# Patient Record
Sex: MALE | Race: WHITE | NOT HISPANIC OR LATINO | Employment: FULL TIME | URBAN - METROPOLITAN AREA
[De-identification: names, ages, dates, MRNs, and addresses within clinical notes are randomized per-mention and may not be internally consistent; named-entity substitution may affect disease eponyms.]

---

## 2017-04-15 ENCOUNTER — HOSPITAL ENCOUNTER (EMERGENCY)
Facility: HOSPITAL | Age: 37
Discharge: HOME | End: 2017-04-15
Attending: EMERGENCY MEDICINE

## 2017-04-15 VITALS
TEMPERATURE: 99 F | SYSTOLIC BLOOD PRESSURE: 135 MMHG | HEIGHT: 74 IN | DIASTOLIC BLOOD PRESSURE: 73 MMHG | BODY MASS INDEX: 25.67 KG/M2 | WEIGHT: 200 LBS | RESPIRATION RATE: 18 BRPM | OXYGEN SATURATION: 96 % | HEART RATE: 68 BPM

## 2017-04-15 DIAGNOSIS — R56.9 SEIZURE: Primary | ICD-10-CM

## 2017-04-15 LAB
ALBUMIN SERPL BCP-MCNC: 4 G/DL
ALP SERPL-CCNC: 50 U/L
ALT SERPL W/O P-5'-P-CCNC: 19 U/L
AMPHET+METHAMPHET UR QL: NEGATIVE
ANION GAP SERPL CALC-SCNC: 12 MMOL/L
AST SERPL-CCNC: 16 U/L
BARBITURATES UR QL SCN>200 NG/ML: NEGATIVE
BASOPHILS # BLD AUTO: 0.04 K/UL
BASOPHILS NFR BLD: 0.2 %
BENZODIAZ UR QL SCN>200 NG/ML: NEGATIVE
BILIRUB SERPL-MCNC: 0.3 MG/DL
BUN SERPL-MCNC: 15 MG/DL
BZE UR QL SCN: NEGATIVE
CALCIUM SERPL-MCNC: 9.3 MG/DL
CANNABINOIDS UR QL SCN: NORMAL
CHLORIDE SERPL-SCNC: 113 MMOL/L
CO2 SERPL-SCNC: 20 MMOL/L
CREAT SERPL-MCNC: 0.9 MG/DL
CREAT UR-MCNC: 30.2 MG/DL
DIFFERENTIAL METHOD: ABNORMAL
EOSINOPHIL # BLD AUTO: 0.1 K/UL
EOSINOPHIL NFR BLD: 0.3 %
ERYTHROCYTE [DISTWIDTH] IN BLOOD BY AUTOMATED COUNT: 13 %
EST. GFR  (AFRICAN AMERICAN): >60 ML/MIN/1.73 M^2
EST. GFR  (NON AFRICAN AMERICAN): >60 ML/MIN/1.73 M^2
ETHANOL SERPL-MCNC: <10 MG/DL
GLUCOSE SERPL-MCNC: 79 MG/DL
HCT VFR BLD AUTO: 49.9 %
HGB BLD-MCNC: 16.8 G/DL
LYMPHOCYTES # BLD AUTO: 1.2 K/UL
LYMPHOCYTES NFR BLD: 6.2 %
MAGNESIUM SERPL-MCNC: 2.7 MG/DL
MCH RBC QN AUTO: 31.8 PG
MCHC RBC AUTO-ENTMCNC: 33.7 %
MCV RBC AUTO: 94 FL
METHADONE UR QL SCN>300 NG/ML: NEGATIVE
MONOCYTES # BLD AUTO: 1.7 K/UL
MONOCYTES NFR BLD: 9.2 %
NEUTROPHILS # BLD AUTO: 15.8 K/UL
NEUTROPHILS NFR BLD: 83.7 %
OPIATES UR QL SCN: NEGATIVE
PCP UR QL SCN>25 NG/ML: NEGATIVE
PLATELET # BLD AUTO: 260 K/UL
PMV BLD AUTO: 10.2 FL
POCT GLUCOSE: 93 MG/DL (ref 70–110)
POTASSIUM SERPL-SCNC: 4.2 MMOL/L
PROT SERPL-MCNC: 6.8 G/DL
RBC # BLD AUTO: 5.29 M/UL
SODIUM SERPL-SCNC: 145 MMOL/L
TOXICOLOGY INFORMATION: NORMAL
WBC # BLD AUTO: 18.84 K/UL

## 2017-04-15 PROCEDURE — 85025 COMPLETE CBC W/AUTO DIFF WBC: CPT

## 2017-04-15 PROCEDURE — 82570 ASSAY OF URINE CREATININE: CPT

## 2017-04-15 PROCEDURE — 63600175 PHARM REV CODE 636 W HCPCS: Performed by: EMERGENCY MEDICINE

## 2017-04-15 PROCEDURE — 80053 COMPREHEN METABOLIC PANEL: CPT

## 2017-04-15 PROCEDURE — 96365 THER/PROPH/DIAG IV INF INIT: CPT

## 2017-04-15 PROCEDURE — 83735 ASSAY OF MAGNESIUM: CPT

## 2017-04-15 PROCEDURE — 80320 DRUG SCREEN QUANTALCOHOLS: CPT

## 2017-04-15 PROCEDURE — 82962 GLUCOSE BLOOD TEST: CPT

## 2017-04-15 PROCEDURE — 99284 EMERGENCY DEPT VISIT MOD MDM: CPT | Mod: 25

## 2017-04-15 RX ORDER — LEVETIRACETAM 1000 MG/1
1000 TABLET ORAL 2 TIMES DAILY
Qty: 60 TABLET | Refills: 11 | Status: SHIPPED | OUTPATIENT
Start: 2017-04-15 | End: 2017-07-25

## 2017-04-15 RX ORDER — LEVETIRACETAM 10 MG/ML
1000 INJECTION INTRAVASCULAR
Status: COMPLETED | OUTPATIENT
Start: 2017-04-15 | End: 2017-04-15

## 2017-04-15 RX ADMIN — LEVETIRACETAM 1000 MG: 1000 INJECTION, SOLUTION INTRAVENOUS at 11:04

## 2017-04-15 NOTE — ED TRIAGE NOTES
Pt came from EMS. Girlfriend states he had 3 seizures each lasting about 4 minutes. Girlfriend states he ha has a seizure HX, most recent was a week ago. EMS states patient was combative and put on restraints. Pt is currently off restraints and sleepy. No c/o SOB or chest pain.

## 2017-04-15 NOTE — DISCHARGE INSTRUCTIONS
"  Recurrent Seizure (Adult)    You have had another seizure today. A common cause of seizures that keep happening (recurrent seizures) is missing doses of seizure medicine. But sometimes seizures are hard to control even when you take the medicine correctly. If this is the case for you, your healthcare provider may need to increase your dosage. Or you may need to add or change to another medicine.  Home care  Follow these tips when caring for yourself at home. For this seizure:  · Seizures arent predictable. So avoid doing anything that might cause danger to you or other people if you have another seizure. Until the seizures are under good control, take these precautions:  ¨ Dont drive, ride a motorcycle, or ride a bike.  ¨ Dont operate dangerous equipment such as power tools  ¨ Take showers instead of baths.  ¨ Dont swim or climb ladders, trees, or roofs.  · Tell your close friends and relatives about your seizure. Teach them what to do for you if it happens again.  · If medicine was prescribed to prevent seizures, take it exactly as directed. It does not work when taken "as needed." Missing doses will increase the risk of having another seizure.  · If you miss a dose, take the missed dose as soon as you remember. If it is almost time for your next dose, skip the missed dose. Restart the medicine at your next scheduled time. Dont take extra medicine to make up the missed dose.  · Wear a "Medic-Alert" bracelet to let emergency personnel know about your condition.  · Follow a regular sleep schedule such that you get at least 6 to 8 hours of restful sleep every night. This is especially important when you are sick with a cold or flu and/or another type of infection.  For future seizures, if you are alone:  If you feel a seizure coming on, lie down on a bed or on the floor with something soft under your head. Lie on your left side, not on your back. This will keep you from falling. It will also let fluid drain out " of your mouth and prevent choking. Be sure you are clear of any objects that might injure you during the seizure. Call for help if there is time.  For future seizures, if someone is with you:  The person should help you get into a safe position and call for help. The person shouldnt try to force anything in your mouth once the seizure begins. This could harm your teeth or jaw.  Follow-up care  Follow up with your healthcare provider. Keep a seizure calendar to record how often you have a seizure. If you are being started on anti-seizure medicine, make sure that you use additional birth control. Seizure medicine can affect how well birth control pills work, and you could become pregnant. Avoid alcohol until your doctor tells you its OK.  Note: For the safety of yourself and others on the road, certain states require that the treating doctor tell the Public Health Department about any adult who is treated for a seizure and is at risk of more seizures. In this case, the Department of Motor Vehicles will be told. A restriction will be put on your s license until a doctor gives you medical clearance to drive again. Contact your treating doctor to find out if your state requires the reporting of patients with a seizures condition.  When to seek medical advice  Call your healthcare provider right away if any of these occur:  · Seizures happen more often or last longer than usual  · A seizure lasts over 5 minutes  · You dont wake up between seizures  · Confusion that lasts more than 30 minutes after a seizure  · Injury during a seizure  · Fever over 100.4ºF (38.0ºC), or as advised  · Unusual irritability, drowsiness, or confusion  · Stiff or painful neck  · Headache that gets worse   Date Last Reviewed: 8/1/2016  © 3586-1407 [employer]. [address] [place] [address]. All rights reserved. This information is not intended as a substitute for professional medical care. Always follow your  healthcare professional's instructions.

## 2017-04-15 NOTE — ED AVS SNAPSHOT
[facility]  [address] [place] [address]               Darren Nicolas   4/15/2017 11:06 AM   ED    Description:  Male : 1980   Department:  [facility]           Your Care was Coordinated By:     Provider Role From To    Galen Shore MD Attending Provider 04/15/17 8268 --      Reason for Visit     Seizures           Diagnoses this Visit        Comments    Seizure    -  Primary       ED Disposition     None           To Do List           Follow-up Information     Follow up with Ozzy Peterson MD. Schedule an appointment as soon as possible for a visit in 1 week.    Specialty:  Neurology    Why:  As needed    Contact information:    [address] [facility]  [address]  [place] [address]  [phone]         These Medications        Disp Refills Start End    levetiracetam (KEPPRA) 1000 MG tablet 60 tablet 11 4/15/2017 4/15/2018    Take 1 tablet (1,000 mg total) by mouth 2 (two) times daily. - Oral      [facility]     [facility] -  Assistance  Unless otherwise directed by your provider, please contact [facility], our nurse care line that is available for  assistance.     Registered nurses in the [facility] provide: appointment scheduling, clinical advisement, health education, and other advisory services.  Call: [phone] (toll free)               Medications           Message regarding Medications     Verify the changes and/or additions to your medication regime listed below are the same as discussed with your clinician today.  If any of these changes or additions are incorrect, please notify your healthcare provider.        START taking these NEW medications        Refills    levetiracetam (KEPPRA) 1000 MG tablet 11    Sig: Take 1 tablet (1,000 mg total) by mouth 2 (two) times daily.    Class: Print    Route: Oral      These medications were administered today        Dose Freq     "levetiracetam in NaCl (iso-os) IVPB 1,000 mg 1,000 mg ED 1 Time    Sig: Inject 100 mLs (1,000 mg total) into the vein ED 1 Time.    Class: Normal    Route: Intravenous      STOP taking these medications     VIMPAT 100 mg Tab TK TWO TABLETS PO BID    lamotrigine (LAMICTAL) 200 MG tablet TK 1 T PO BID           Verify that the below list of medications is an accurate representation of the medications you are currently taking.  If none reported, the list may be blank. If incorrect, please contact your healthcare provider. Carry this list with you in case of emergency.           Current Medications     levetiracetam (KEPPRA) 1000 MG tablet Take 1 tablet (1,000 mg total) by mouth 2 (two) times daily.           Clinical Reference Information           Your Vitals Were     BP Pulse Temp Resp Height Weight    130/65 80 98.2 °F (36.8 °C) (Oral) 20 6' 2" (1.88 m) 90.7 kg (200 lb)    SpO2 BMI             97% 25.68 kg/m2         Allergies as of 4/15/2017     No Known Allergies      Immunizations Administered on Date of Encounter - 4/15/2017     None      ED Micro, Lab, POCT     Start Ordered       Status Ordering Provider    04/15/17 1128 04/15/17 1128  POCT glucose  Once      Final result     04/15/17 1113 04/15/17 1112  CBC W/ AUTO DIFFERENTIAL  STAT      Final result     04/15/17 1113 04/15/17 1112  Comprehensive metabolic panel  STAT      Final result     04/15/17 1113 04/15/17 1112  Magnesium  STAT      Final result     04/15/17 1113 04/15/17 1112  POCT glucose  Once      Acknowledged     04/15/17 1113 04/15/17 1112  Drug screen panel, emergency  STAT      Final result     04/15/17 1113 04/15/17 1112  Ethanol level  STAT      Final result       ED Imaging Orders     None        Discharge Instructions         Recurrent Seizure (Adult)    You have had another seizure today. A common cause of seizures that keep happening (recurrent seizures) is missing doses of seizure medicine. But sometimes seizures are hard to control even " "when you take the medicine correctly. If this is the case for you, your healthcare provider may need to increase your dosage. Or you may need to add or change to another medicine.  Home care  Follow these tips when caring for yourself at home. For this seizure:  · Seizures arent predictable. So avoid doing anything that might cause danger to you or other people if you have another seizure. Until the seizures are under good control, take these precautions:  ¨ Dont drive, ride a motorcycle, or ride a bike.  ¨ Dont operate dangerous equipment such as power tools  ¨ Take showers instead of baths.  ¨ Dont swim or climb ladders, trees, or roofs.  · Tell your close friends and relatives about your seizure. Teach them what to do for you if it happens again.  · If medicine was prescribed to prevent seizures, take it exactly as directed. It does not work when taken "as needed." Missing doses will increase the risk of having another seizure.  · If you miss a dose, take the missed dose as soon as you remember. If it is almost time for your next dose, skip the missed dose. Restart the medicine at your next scheduled time. Dont take extra medicine to make up the missed dose.  · Wear a "Medic-Alert" bracelet to let emergency personnel know about your condition.  · Follow a regular sleep schedule such that you get at least 6 to 8 hours of restful sleep every night. This is especially important when you are sick with a cold or flu and/or another type of infection.  For future seizures, if you are alone:  If you feel a seizure coming on, lie down on a bed or on the floor with something soft under your head. Lie on your left side, not on your back. This will keep you from falling. It will also let fluid drain out of your mouth and prevent choking. Be sure you are clear of any objects that might injure you during the seizure. Call for help if there is time.  For future seizures, if someone is with you:  The person should help you " get into a safe position and call for help. The person shouldnt try to force anything in your mouth once the seizure begins. This could harm your teeth or jaw.  Follow-up care  Follow up with your healthcare provider. Keep a seizure calendar to record how often you have a seizure. If you are being started on anti-seizure medicine, make sure that you use additional birth control. Seizure medicine can affect how well birth control pills work, and you could become pregnant. Avoid alcohol until your doctor tells you its OK.  Note: For the safety of yourself and others on the road, certain states require that the treating doctor tell the Public Health Department about any adult who is treated for a seizure and is at risk of more seizures. In this case, the Department of Motor Vehicles will be told. A restriction will be put on your s license until a doctor gives you medical clearance to drive again. Contact your treating doctor to find out if your state requires the reporting of patients with a seizures condition.  When to seek medical advice  Call your healthcare provider right away if any of these occur:  · Seizures happen more often or last longer than usual  · A seizure lasts over 5 minutes  · You dont wake up between seizures  · Confusion that lasts more than 30 minutes after a seizure  · Injury during a seizure  · Fever over 100.4ºF (38.0ºC), or as advised  · Unusual irritability, drowsiness, or confusion  · Stiff or painful neck  · Headache that gets worse   Date Last Reviewed: 8/1/2016  © 3755-2530 [employer]. [address] [place] [address]. All rights reserved. This information is not intended as a substitute for professional medical care. Always follow your healthcare professional's instructions.          [facility] Sign-Up     Activating your [facility] account is as easy as 1-2-3!     1) Visit [email or web address], select Sign Up Now, enter this activation code and your date of  birth, then select Next.  [email or web address]  Expires: 5/30/2017 12:47 PM      2) Create a username and password to use when you visit [facility] in the future and select a security question in case you lose your password and select Next.    3) Enter your e-mail address and click Sign Up!    Additional Information  If you have questions, please e-mail [email or web address] or call [phone] to talk to our [employer] staff. Remember, [facility] is NOT to be used for urgent needs. For medical emergencies, dial 911.         Smoking Cessation     If you would like to quit smoking:   You may be eligible for free services if you are a [place] resident and started smoking cigarettes before September 1, 1988.  Call the Smoking Cessation Trust (SCT) toll free at (505) 625-2308 or (747) 911-2392.   Call 8-701-QUIT-NOW if you do not meet the above criteria.   Contact us via email: [email or web address]   View our website for more information: [email or web address]         [facility] complies with applicable Federal civil rights laws and does not discriminate on the basis of race, color, national origin, age, disability, or sex.        Language Assistance Services     ATTENTION: Language assistance services are available, free of charge. Please call [phone].      ATENCIÓN: Si habla español, tiene a andrade disposición servicios gratuitos de asistencia lingüística. Llame al [phone].     CHÚ Ý: N?u b?n nói Ti?ng Vi?t, có các d?ch v? h? tr? ngôn ng? mi?n phí dành cho b?n. G?i s? [phone].

## 2017-04-15 NOTE — ED PROVIDER NOTES
Encounter Date: 4/15/2017    SCRIBE #1 NOTE: I, Amalia Akbar, am scribing for, and in the presence of,  Galen Shore MD. I have scribed the following portions of the note - Other sections scribed: HPI/ROS.       History     Chief Complaint   Patient presents with    Seizures     seizures today x 3, non-compliant with meds for a few months     Review of patient's allergies indicates:  No Known Allergies  HPI Comments: CC: Seizure    HPI: This 37 y.o. male with PMHx of seizures presents to the ED with his spouse c/o a two week history of multiple seizures.  The spouse reports he had three big ones this morning, so they decided to come in.  She states he ran out of his medications two weeks ago and hasn't been able to afford a refill.  The patient denies fever, abdominal pain or any other associated symptoms.       The history is provided by a relative.     Past Medical History:   Diagnosis Date    Seizure      History reviewed. No pertinent surgical history.  History reviewed. No pertinent family history.  Social History   Substance Use Topics    Smoking status: Current Every Day Smoker    Smokeless tobacco: None    Alcohol use None     Review of Systems   Constitutional: Negative for fever.   HENT: Negative for ear pain and sore throat.    Eyes: Negative for visual disturbance.   Respiratory: Negative for cough and shortness of breath.    Cardiovascular: Negative for chest pain.   Gastrointestinal: Negative for abdominal pain and diarrhea.   Genitourinary: Negative for dysuria and flank pain.   Musculoskeletal: Negative for back pain.   Skin: Negative for rash.   Neurological: Positive for seizures.       Physical Exam   Initial Vitals   BP Pulse Resp Temp SpO2   -- -- -- -- --            Physical Exam    Nursing note and vitals reviewed.  Constitutional: He appears well-developed and well-nourished.   HENT:   Head: Normocephalic and atraumatic.   Eyes: EOM are normal. Pupils are equal, round, and reactive to  light.   Cardiovascular: Normal rate, regular rhythm, normal heart sounds and intact distal pulses.   Pulmonary/Chest: Breath sounds normal. No respiratory distress. He has no wheezes. He has no rhonchi. He has no rales. He exhibits no tenderness.   Abdominal: Soft. Bowel sounds are normal. He exhibits no distension. There is no tenderness.   Musculoskeletal: Normal range of motion. He exhibits no edema.   Neurological: He is alert and oriented to person, place, and time.   Skin: Skin is warm and dry.         ED Course   Procedures  Labs Reviewed   CBC W/ AUTO DIFFERENTIAL - Abnormal; Notable for the following:        Result Value    WBC 18.84 (*)     MCH 31.8 (*)     Gran # 15.8 (*)     Mono # 1.7 (*)     Gran% 83.7 (*)     Lymph% 6.2 (*)     All other components within normal limits   COMPREHENSIVE METABOLIC PANEL - Abnormal; Notable for the following:     Chloride 113 (*)     CO2 20 (*)     Alkaline Phosphatase 50 (*)     All other components within normal limits   MAGNESIUM - Abnormal; Notable for the following:     Magnesium 2.7 (*)     All other components within normal limits   DRUG SCREEN PANEL, URINE EMERGENCY   ALCOHOL,MEDICAL (ETHANOL)   POCT GLUCOSE   POCT GLUCOSE            MEDICAL DECISION MAKING    This is an emergent evaluation of the patient's symptoms.  Differential diagnoses includes: Epilepsy, medication noncompliance, hyponatremia, drug intoxication. Room air pulse oximetry interpreted by me as normal. A decision was made to obtain the patient's medical records.  Records were not immediately available for review.  No evidence of acute metabolic abnormality which would require emergent correction.  This is an acute exacerbation of the patient's chronic epilepsy secondary to medication noncompliance.  Patient loaded with Keppra.  Restart Keppra.  Outpatient neurology follow-up.             Scribe Attestation:   Scribe #1: I performed the above scribed service and the documentation accurately  describes the services I performed. I attest to the accuracy of the note.    Attending Attestation:           Physician Attestation for Scribe:  Physician Attestation Statement for Scribe #1: I, Galen Shore MD, reviewed documentation, as scribed by Amalia Akbar in my presence, and it is both accurate and complete.                 ED Course     Clinical Impression:   The encounter diagnosis was Seizure.          Galen Shore MD  04/15/17 3725

## 2017-07-06 ENCOUNTER — TELEPHONE (OUTPATIENT)
Dept: NEUROLOGY | Facility: CLINIC | Age: 37
End: 2017-07-06

## 2017-07-06 NOTE — TELEPHONE ENCOUNTER
Spoke to Pt he verbalized his appt date and time        Message from Tanya Maldonado sent at 7/6/2017 12:56 PM CDT -----  Contact: Pt  PT called to speak to the nurse to schedule an appt with the provider and to request medication refills and would like a call back.    Pt can be reached at [phone].    Thanks

## 2017-07-11 ENCOUNTER — TELEPHONE (OUTPATIENT)
Dept: NEUROLOGY | Facility: CLINIC | Age: 37
End: 2017-07-11

## 2017-07-11 RX ORDER — LEVETIRACETAM 1000 MG/1
1000 TABLET ORAL 2 TIMES DAILY
Qty: 60 TABLET | Refills: 11 | Status: CANCELLED | OUTPATIENT
Start: 2017-07-11 | End: 2018-07-11

## 2017-07-11 NOTE — TELEPHONE ENCOUNTER
----- Message from Chely Weinstein sent at 7/11/2017  3:02 PM CDT -----  Contact: Patient [phone]  Patient is calling to let Ms. JUAN J know that he needs the Lamotrigine called in.

## 2017-07-11 NOTE — TELEPHONE ENCOUNTER
Medication sent to wrong provider.Never seen by Dr. Vargas. Medication last refill by Lui Rizo MD. Please advised.

## 2017-07-13 ENCOUNTER — TELEPHONE (OUTPATIENT)
Dept: NEUROLOGY | Facility: CLINIC | Age: 37
End: 2017-07-13

## 2017-07-13 NOTE — TELEPHONE ENCOUNTER
called in lamotrigine 200mg BID for 60 total, no refills until his visit to be evaluated by Dr. Rizo this month

## 2017-07-17 ENCOUNTER — TELEPHONE (OUTPATIENT)
Dept: NEUROLOGY | Facility: CLINIC | Age: 37
End: 2017-07-17

## 2017-07-17 NOTE — TELEPHONE ENCOUNTER
----- Message from Stephon Bettencourt sent at 7/17/2017  9:33 AM CDT -----  Contact: Self [phone]  Patient is calling to get an update on the medication request ( levetiracetam (KEPPRA) 1000 MG tablet ), pt is out of medication     [id number] - [place], [place] - [address] [place] [address]  Phone: [phone] Fax: [phone]

## 2017-07-25 ENCOUNTER — OFFICE VISIT (OUTPATIENT)
Dept: NEUROLOGY | Facility: CLINIC | Age: 37
End: 2017-07-25
Payer: COMMERCIAL

## 2017-07-25 VITALS
WEIGHT: 175.94 LBS | BODY MASS INDEX: 22.58 KG/M2 | HEART RATE: 79 BPM | DIASTOLIC BLOOD PRESSURE: 68 MMHG | HEIGHT: 74 IN | SYSTOLIC BLOOD PRESSURE: 123 MMHG

## 2017-07-25 DIAGNOSIS — G40.219 PARTIAL SYMPTOMATIC EPILEPSY WITH COMPLEX PARTIAL SEIZURES, INTRACTABLE, WITHOUT STATUS EPILEPTICUS: Primary | ICD-10-CM

## 2017-07-25 PROCEDURE — 99213 OFFICE O/P EST LOW 20 MIN: CPT | Mod: S$GLB,,, | Performed by: PSYCHIATRY & NEUROLOGY

## 2017-07-25 PROCEDURE — 99999 PR PBB SHADOW E&M-EST. PATIENT-LVL III: CPT | Mod: PBBFAC,,, | Performed by: PSYCHIATRY & NEUROLOGY

## 2017-07-25 RX ORDER — HYOSCYAMINE SULFATE 0.12 MG/1
0.12 TABLET SUBLINGUAL EVERY 6 HOURS PRN
Qty: 60 TABLET | Refills: 0 | Status: ON HOLD | OUTPATIENT
Start: 2017-07-25 | End: 2023-09-06 | Stop reason: HOSPADM

## 2017-07-25 RX ORDER — LAMOTRIGINE 200 MG/1
TABLET ORAL
COMMUNITY
Start: 2017-07-13 | End: 2017-07-25 | Stop reason: SDUPTHER

## 2017-07-25 RX ORDER — LAMOTRIGINE 200 MG/1
200 TABLET ORAL 2 TIMES DAILY
Qty: 60 TABLET | Refills: 11 | Status: SHIPPED | OUTPATIENT
Start: 2017-07-25 | End: 2017-12-15

## 2017-07-25 NOTE — PROGRESS NOTES
Name: Darren Nicolas  MRN: [id number]   CSN: [id number]      Date:  9/07/2016     HISTORY OF PRESENT ILLNESS (HPI)  The patient is a 37 y.o. WM   The patient was unaccompanied today seen for F/U since last year.     Pt was lost to F/U since last year.  He self-D/C'd all his AEDs and had increased seizure frequency in past few months.  He restarted only one of prior 3 drug regimen LTG - since and has maintained LTG 200mg BID since then  Has found same or better level of seizure control as on 3 drug regimen past few months since  Lives with girlfriend now - she notes minor sz with confusion upon awakening in AM's approx 1 per month at current time  No SE on LTG               Seizure Triggers  Sleep Deprivation - None  Other medications - None  Psych/stress - None  Photic stimulation - None  Hyperventilation - None  Medical Problems - None  Menses - No  Sensory Stimulation (light, sound, etc) - None  Missed dose of meds - None     AED Treatments  Present regimen  lamotrigine (Lamictal, LTG)  200mg BID           Prior treatments  oxcarbazepine (Trileptal OXC)  Vimpat 200mg BID  Keppra 500mg BID        Not tried  acetazolamide (Diamox, AZM)  amantadine  carbamazepine (Tegretol, CBZ)  clobezam (Onfi or Frizium, CLB)  ethosuximide (Zarontin, ESM)  eslicarbazine (Aptiom, ESL)  felbamate (felbatol, FBM)  gabapentin (Neurontin, GBP)  methsuximide (Celontin, MSM)  methyphenytoin (Mesantion, MHT)  perampanel (Fycompa, FCP)   phenobarbital (Pb)  phenytoin (Dilantin, PHT)  pregabalin (Lyrica, PGB)  primidone (Mysoline, PRM)  retigabine (Potiga, RTG)  rufinamide (Banzel, RUF)  tiagabine (Gabatril,  TGB)  topiramate (Topamax, TPM)  viagabatrin, (Sabril, VGB)  vagal nerve stimulator (VNS)  valproic acid (Depakote, VPA)  zonisamide (Zonegran, ZNS)  Benzodiazepines  diazepam - rectal (Diastatl)  diazepam - oral (Valium, DZ)  clonazepam (Klonopin, CZP)  clorazepate (Tranxene, CLZ)  Ativan  Brain Stimulation  Vagal Nerve  Stimulation-n/a  DBS- n/a     Compliance method  Memory - yes  Mom or Spouse - Yes  Pill Box - no  Edward calendar - no  Turn over medication bottle - no  Phone alarm - no     Seizure Evaluation  EEG Routine -   EEG Ambulatory -   EEG\Video Monitoring -   MRI/MRA -   CT/CTA Scan -   PET Scan -   Neuropsychological evaluation -   DEXA Scan     Potential Epilepsy Risk Factors:   Pregnancy/Labor/Delivery - full term uncomplicated pregnancy labor and vaginal delivery  Febrile seizures - none  Head injury  - none  CNS infection - none     Stroke - none  Family Hx of Sz - none     PAST MEDICAL HISTORY:        Active Ambulatory Problems     Diagnosis Date Noted    No Active Ambulatory Problems           Resolved Ambulatory Problems     Diagnosis Date Noted    No Resolved Ambulatory Problems      No Additional Past Medical History         PAST SURGICAL HISTORY: No past surgical history on file.      FAMILY HISTORY: No family history on file.       SOCIAL HISTORY:   Social History   Social History            Social History    Marital status: Single       Spouse name: N/A    Number of children: N/A    Years of education: N/A          Occupational History    Not on file.           Social History Main Topics    Smoking status: Current Every Day Smoker    Smokeless tobacco: Not on file    Alcohol use Not on file    Drug use: Not on file    Sexual activity: Not on file           Other Topics Concern    Not on file          Social History Narrative    No narrative on file            SUBSTANCE USE:  Social History           Social History Main Topics    Smoking status: Current Every Day Smoker    Smokeless tobacco: Not on file    Alcohol use Not on file    Drug use: Not on file    Sexual activity: Not on file           Social History   Substance Use Topics    Smoking status: Current Every Day Smoker    Smokeless tobacco: Not on file    Alcohol use Not on file         ALLERGIES: Review of patient's allergies  "indicates no known allergies.         Review of Systems   Constitutional: Negative for chills, diaphoresis, fever, malaise/fatigue and weight loss.   HENT: Negative for ear pain, hearing loss, nosebleeds and tinnitus.    Eyes: Negative for blurred vision, double vision, photophobia and pain.   Respiratory: Negative for cough, hemoptysis and shortness of breath.    Cardiovascular: Negative for chest pain, palpitations, orthopnea and leg swelling.   Gastrointestinal: Negative for abdominal pain, blood in stool, constipation, diarrhea, heartburn, nausea and vomiting.   Genitourinary: Negative for dysuria and hematuria.   Musculoskeletal: Negative for falls, joint pain and myalgias.   Skin: Negative for itching and rash.   Neurological: Positive for headaches. Negative for dizziness, tingling, tremors, sensory change, speech change, focal weakness, loss of consciousness and weakness.   Endo/Heme/Allergies: Negative for environmental allergies. Does not bruise/bleed easily.   Psychiatric/Behavioral: Negative for depression, hallucinations, memory loss and substance abuse. The patient is not nervous/anxious and does not have insomnia.             PHYSICAL EXAM:          Visit Vitals    /75    Pulse 85    Ht 6' 2" (1.88 m)    Wt 89.9 kg (198 lb 3.1 oz)    BMI 25.45 kg/m2            Neurologic Exam        Higher Cortical Function:    Patient is a well developed, pleasant, well groomed individual appearing their stated age  Oriented - intact to person, place and time    Fund of knowledge was appropriate.    Language - Speech was fluent without evidence for an aphasia.  R-L Orientation - Intact   Agnosias, agraphesthesia, or astereognosis - not present.   Cranial Nerves II - XII:    EOMs were intact with normal smooth and no nystagmus.    PERRLA. Funduscopic exam - disc were flat with normal A/V ratio and no exudates or hemorrhages.   Visual fields were full to confrontation.    Motor - facial movement was " symmetrical and normal.    Facial sensory - Light touch and pin prick sensations were normal.    Hearing was normal.  Palate moved well and was symmetrical    Tongue movement was full & the patient could speak without difficulty.  Motor: Power, bulk and tone were normal in all extremities.  Coordination:  Normal  Gait:  Normal     Deep tendon reflexes:    Reflex L R   Bicpets 2+ 2+   Tricepts 2+ 2+   Brachio-radialis 2+ 2+   Knee 2+ 2+   Ankle 2+ 2+   Babinski No No      Tremor: resting, postural, intentional - none  Cardiovascular:  No edema  Skin: No rash           I spent 40 minutes with the patient, of which 30 minutes was spent in direct face to face counseling in matters related to epilepsy, related safety issues, and antiepileptic drug therapy.                    IMPRESSION  1.                   11 year h/o intractable epilepsy - partially controlled  2.                   S/P epilepsy surgery 3 yrs ago (Details unclear, has left cranial scar)  3.                   Incomplete database/poor historian  4.                   HA at site of craniotomy - improved         DISPOSITION:   1.                   Complete database by obtaining all old records from [place] including prior meds and details of surgical tx - 2nd attempt  2.                   Check LTG level and titrate up to optimum levles to maintain pt's wish for monotherapy and hopefully gain better control  3.                   Seizure precautions.  4.                   State driving laws explained to patient.  5.                   Will see back in 3 months

## 2017-07-25 NOTE — LETTER
July 25, 2017      Smita Phillips, ELSI  [address] [facility] [address]           [facility]rology Epilepsy  [address], [address]  [facility] [address]  Phone: [phone]  Fax: [phone]          Patient: Darren Nicolas   MR Number: [id number]   YOB: 1980   Date of Visit: 7/25/2017       Dear Smita Phillips:    Thank you for referring Darren Nicolas to me for evaluation. Attached you will find relevant portions of my assessment and plan of care.    If you have questions, please do not hesitate to call me. I look forward to following Darren Nicolas along with you.    Sincerely,    Lui Rizo MD    Enclosure  CC:  No Recipients    If you would like to receive this communication electronically, please contact [email or web address] or [phone] to request more information on [employer] Link access.    For providers and/or their staff who would like to refer a patient to [facility], please contact us through our one-stop-shop provider referral line, [facility], at [phone].    If you feel you have received this communication in error or would no longer like to receive these types of communications, please e-mail [email or web address]

## 2017-08-09 ENCOUNTER — TELEPHONE (OUTPATIENT)
Dept: NEUROLOGY | Facility: CLINIC | Age: 37
End: 2017-08-09

## 2017-08-09 NOTE — TELEPHONE ENCOUNTER
----- Message from Rad Phillips sent at 8/9/2017 11:53 AM CDT -----  Contact: Self @ [phone]  Pt would like to discuss results from lab on 07/25/17. Pt is also wondering if he needs to schedule another appt. Pls call.

## 2017-08-09 NOTE — TELEPHONE ENCOUNTER
I LM returning his call giving lab results and also to schedule a f/u to see Dr. Rizo again end of october

## 2017-12-15 ENCOUNTER — OFFICE VISIT (OUTPATIENT)
Dept: NEUROLOGY | Facility: CLINIC | Age: 37
End: 2017-12-15
Payer: COMMERCIAL

## 2017-12-15 VITALS
HEART RATE: 88 BPM | WEIGHT: 184.31 LBS | BODY MASS INDEX: 22.92 KG/M2 | DIASTOLIC BLOOD PRESSURE: 73 MMHG | SYSTOLIC BLOOD PRESSURE: 122 MMHG | HEIGHT: 75 IN

## 2017-12-15 DIAGNOSIS — N52.1 ERECTILE DYSFUNCTION DUE TO DISEASES CLASSIFIED ELSEWHERE: ICD-10-CM

## 2017-12-15 DIAGNOSIS — G40.219 PARTIAL SYMPTOMATIC EPILEPSY WITH COMPLEX PARTIAL SEIZURES, INTRACTABLE, WITHOUT STATUS EPILEPTICUS: Primary | ICD-10-CM

## 2017-12-15 PROBLEM — N52.9 ERECTILE DYSFUNCTION: Status: ACTIVE | Noted: 2017-12-15

## 2017-12-15 PROBLEM — G40.919 INTRACTABLE EPILEPSY WITHOUT STATUS EPILEPTICUS: Status: ACTIVE | Noted: 2017-12-15

## 2017-12-15 PROCEDURE — 99215 OFFICE O/P EST HI 40 MIN: CPT | Mod: S$GLB,,, | Performed by: PSYCHIATRY & NEUROLOGY

## 2017-12-15 PROCEDURE — 99999 PR PBB SHADOW E&M-EST. PATIENT-LVL II: CPT | Mod: PBBFAC,,, | Performed by: PSYCHIATRY & NEUROLOGY

## 2017-12-15 RX ORDER — SILDENAFIL 50 MG/1
50 TABLET, FILM COATED ORAL DAILY PRN
Qty: 12 TABLET | Refills: 1 | Status: ON HOLD | OUTPATIENT
Start: 2017-12-15 | End: 2023-09-06 | Stop reason: HOSPADM

## 2017-12-15 RX ORDER — LAMOTRIGINE 250 MG/1
250 TABLET, EXTENDED RELEASE ORAL 2 TIMES DAILY
Qty: 60 TABLET | Refills: 11 | Status: SHIPPED | OUTPATIENT
Start: 2017-12-15 | End: 2018-02-16

## 2017-12-15 NOTE — PROGRESS NOTES
INTERVAL HISTORY:  12/15/17:  Seizure frequency increased  Several per month  Usually early AM, in bed  Convulsive. Girlfriend took video - clearly convulsive activity. (Looks epileptic)  Takes  BID consistently  C/O post-sz and med related erectile dysfunction        Date:    9/07/2016         HISTORY OF PRESENT ILLNESS (HPI)  The patient is a 37 y.o. WM   The patient was unaccompanied today seen for F/U since last year.      Pt was lost to F/U since last year.  He self-D/C'd all his AEDs and had increased seizure frequency in past few months.  He restarted only one of prior 3 drug regimen LTG - since and has maintained LTG 200mg BID since then  Has found same or better level of seizure control as on 3 drug regimen past few months since  Lives with girlfriend now - she notes minor sz with confusion upon awakening in AM's approx 1 per month at current time  No SE on LTG               Seizure Triggers  Sleep Deprivation - None  Other medications - None  Psych/stress - None  Photic stimulation - None  Hyperventilation - None  Medical Problems - None  Menses - No  Sensory Stimulation (light, sound, etc) - None  Missed dose of meds - None     AED Treatments  Present regimen  lamotrigine (Lamictal, LTG)  200mg BID            Prior treatments  oxcarbazepine (Trileptal OXC)  Vimpat 200mg BID  Keppra 500mg BID        Not tried  acetazolamide (Diamox, AZM)  amantadine  carbamazepine (Tegretol, CBZ)  clobezam (Onfi or Frizium, CLB)  ethosuximide (Zarontin, ESM)  eslicarbazine (Aptiom, ESL)  felbamate (felbatol, FBM)  gabapentin (Neurontin, GBP)  methsuximide (Celontin, MSM)  methyphenytoin (Mesantion, MHT)  perampanel (Fycompa, FCP)   phenobarbital (Pb)  phenytoin (Dilantin, PHT)  pregabalin (Lyrica, PGB)  primidone (Mysoline, PRM)  retigabine (Potiga, RTG)  rufinamide (Banzel, RUF)  tiagabine (Gabatril,  TGB)  topiramate (Topamax, TPM)  viagabatrin, (Sabril, VGB)  vagal nerve stimulator (VNS)  valproic acid (Depakote,  VPA)  zonisamide (Zonegran, ZNS)  Benzodiazepines  diazepam - rectal (Diastatl)  diazepam - oral (Valium, DZ)  clonazepam (Klonopin, CZP)  clorazepate (Tranxene, CLZ)  Ativan  Brain Stimulation  Vagal Nerve Stimulation-n/a  DBS- n/a     Compliance method  Memory - yes  Mom or Spouse - Yes  Pill Box - no  Edward calendar - no  Turn over medication bottle - no  Phone alarm - no     Seizure Evaluation  EEG Routine -   EEG Ambulatory -   EEG\Video Monitoring -   MRI/MRA -   CT/CTA Scan -   PET Scan -   Neuropsychological evaluation -   DEXA Scan     Potential Epilepsy Risk Factors:   Pregnancy/Labor/Delivery - full term uncomplicated pregnancy labor and vaginal delivery  Febrile seizures - none  Head injury  - none  CNS infection - none     Stroke - none  Family Hx of Sz - none     PAST MEDICAL HISTORY:           Active Ambulatory Problems     Diagnosis Date Noted    No Active Ambulatory Problems              Resolved Ambulatory Problems     Diagnosis Date Noted    No Resolved Ambulatory Problems      No Additional Past Medical History         PAST SURGICAL HISTORY: No past surgical history on file.      FAMILY HISTORY: No family history on file.       SOCIAL HISTORY:   Social History   Social History                Social History    Marital status: Single       Spouse name: N/A    Number of children: N/A    Years of education: N/A            Occupational History    Not on file.              Social History Main Topics    Smoking status: Current Every Day Smoker    Smokeless tobacco: Not on file    Alcohol use Not on file    Drug use: Not on file    Sexual activity: Not on file              Other Topics Concern    Not on file            Social History Narrative    No narrative on file            SUBSTANCE USE:  Social History              Social History Main Topics    Smoking status: Current Every Day Smoker    Smokeless tobacco: Not on file    Alcohol use Not on file    Drug use: Not on file    Sexual  "activity: Not on file              Social History   Substance Use Topics    Smoking status: Current Every Day Smoker    Smokeless tobacco: Not on file    Alcohol use Not on file         ALLERGIES: Review of patient's allergies indicates no known allergies.         Review of Systems   Constitutional: Negative for chills, diaphoresis, fever, malaise/fatigue and weight loss.   HENT: Negative for ear pain, hearing loss, nosebleeds and tinnitus.    Eyes: Negative for blurred vision, double vision, photophobia and pain.   Respiratory: Negative for cough, hemoptysis and shortness of breath.    Cardiovascular: Negative for chest pain, palpitations, orthopnea and leg swelling.   Gastrointestinal: Negative for abdominal pain, blood in stool, constipation, diarrhea, heartburn, nausea and vomiting.   Genitourinary: Negative for dysuria and hematuria.   Musculoskeletal: Negative for falls, joint pain and myalgias.   Skin: Negative for itching and rash.   Neurological: Positive for headaches. Negative for dizziness, tingling, tremors, sensory change, speech change, focal weakness, loss of consciousness and weakness.   Endo/Heme/Allergies: Negative for environmental allergies. Does not bruise/bleed easily.   Psychiatric/Behavioral: Negative for depression, hallucinations, memory loss and substance abuse. The patient is not nervous/anxious and does not have insomnia.             PHYSICAL EXAM:             Visit Vitals    /75    Pulse 85    Ht 6' 2" (1.88 m)    Wt 89.9 kg (198 lb 3.1 oz)    BMI 25.45 kg/m2            Neurologic Exam        Higher Cortical Function:    Patient is a well developed, pleasant, well groomed individual appearing their stated age  Oriented - intact to person, place and time    Fund of knowledge was appropriate.    Language - Speech was fluent without evidence for an aphasia.  R-L Orientation - Intact   Agnosias, agraphesthesia, or astereognosis - not present.   Cranial Nerves II - " XII:    EOMs were intact with normal smooth and no nystagmus.    PERRLA. Funduscopic exam - disc were flat with normal A/V ratio and no exudates or hemorrhages.   Visual fields were full to confrontation.    Motor - facial movement was symmetrical and normal.    Facial sensory - Light touch and pin prick sensations were normal.    Hearing was normal.  Palate moved well and was symmetrical    Tongue movement was full & the patient could speak without difficulty.  Motor: Power, bulk and tone were normal in all extremities.  Coordination:  Normal  Gait:  Normal     Deep tendon reflexes:    Reflex L R   Bicpets 2+ 2+   Tricepts 2+ 2+   Brachio-radialis 2+ 2+   Knee 2+ 2+   Ankle 2+ 2+   Babinski No No      Tremor: resting, postural, intentional - none  Cardiovascular:  No edema  Skin: No rash                 IMPRESSION  1.                   11 year h/o intractable epilepsy - partially controlled  2.                   S/P epilepsy surgery 3 yrs ago (Details unclear, has left cranial scar)  3.                   Incomplete database/poor historian  4.                   HA at site of craniotomy - improved         DISPOSITION:   1.                   Complete database by obtaining all old records from [place] including prior meds and details of surgical tx - 3rd attempt - pt will try also  2.                   Increase LTG to 250mg BID today and further as tolerated - then add 2nd agent if not controlled  3.                   Seizure precautions.  4.                   State driving laws explained to patient.  5.                   Prescrible prn Viagra for ED - Refer to primary care as well (Currently w/o cardiac signs/sx)

## 2018-02-16 ENCOUNTER — OFFICE VISIT (OUTPATIENT)
Dept: NEUROLOGY | Facility: CLINIC | Age: 38
End: 2018-02-16
Payer: COMMERCIAL

## 2018-02-16 VITALS
WEIGHT: 181.44 LBS | HEART RATE: 86 BPM | SYSTOLIC BLOOD PRESSURE: 119 MMHG | HEIGHT: 75 IN | DIASTOLIC BLOOD PRESSURE: 62 MMHG | BODY MASS INDEX: 22.56 KG/M2

## 2018-02-16 DIAGNOSIS — G40.219 PARTIAL SYMPTOMATIC EPILEPSY WITH COMPLEX PARTIAL SEIZURES, INTRACTABLE, WITHOUT STATUS EPILEPTICUS: Primary | ICD-10-CM

## 2018-02-16 PROCEDURE — 3008F BODY MASS INDEX DOCD: CPT | Mod: S$GLB,,, | Performed by: PSYCHIATRY & NEUROLOGY

## 2018-02-16 PROCEDURE — 99213 OFFICE O/P EST LOW 20 MIN: CPT | Mod: S$GLB,,, | Performed by: PSYCHIATRY & NEUROLOGY

## 2018-02-16 PROCEDURE — 99999 PR PBB SHADOW E&M-EST. PATIENT-LVL II: CPT | Mod: PBBFAC,,, | Performed by: PSYCHIATRY & NEUROLOGY

## 2018-02-16 RX ORDER — LEVETIRACETAM 750 MG/1
750 TABLET ORAL 2 TIMES DAILY
Qty: 60 TABLET | Refills: 11 | Status: SHIPPED | OUTPATIENT
Start: 2018-02-16 | End: 2019-05-13

## 2018-02-16 RX ORDER — LAMOTRIGINE 100 MG/1
250 TABLET ORAL 2 TIMES DAILY
Qty: 150 TABLET | Refills: 11 | Status: SHIPPED | OUTPATIENT
Start: 2018-02-16 | End: 2019-05-13

## 2018-02-17 NOTE — PROGRESS NOTES
INTERVAL HISTORY:  2/17/18:  Seizures slightly improved, but no difference noted with LTG-ER formulation  Wants to go back to IR, due to cost  Willing to add back 2nd drug also for better control          12/15/17:  Seizure frequency increased  Several per month  Usually early AM, in bed  Convulsive. Girlfriend took video - clearly convulsive activity. (Looks epileptic)  Takes  BID consistently  C/O post-sz and med related erectile dysfunction           Date:    9/07/2016         HISTORY OF PRESENT ILLNESS (HPI)  The patient is a 37 y.o. WM   The patient was unaccompanied today seen for F/U since last year.      Pt was lost to F/U since last year.  He self-D/C'd all his AEDs and had increased seizure frequency in past few months.  He restarted only one of prior 3 drug regimen LTG - since and has maintained LTG 200mg BID since then  Has found same or better level of seizure control as on 3 drug regimen past few months since  Lives with girlfriend now - she notes minor sz with confusion upon awakening in AM's approx 1 per month at current time  No SE on LTG               Seizure Triggers  Sleep Deprivation - None  Other medications - None  Psych/stress - None  Photic stimulation - None  Hyperventilation - None  Medical Problems - None  Menses - No  Sensory Stimulation (light, sound, etc) - None  Missed dose of meds - None     AED Treatments  Present regimen  lamotrigine (Lamictal, LTG)  200mg BID            Prior treatments  oxcarbazepine (Trileptal OXC)  Vimpat 200mg BID  Keppra 500mg BID        Not tried  acetazolamide (Diamox, AZM)  amantadine  carbamazepine (Tegretol, CBZ)  clobezam (Onfi or Frizium, CLB)  ethosuximide (Zarontin, ESM)  eslicarbazine (Aptiom, ESL)  felbamate (felbatol, FBM)  gabapentin (Neurontin, GBP)  methsuximide (Celontin, MSM)  methyphenytoin (Mesantion, MHT)  perampanel (Fycompa, FCP)   phenobarbital (Pb)  phenytoin (Dilantin, PHT)  pregabalin (Lyrica, PGB)  primidone (Mysoline,  PRM)  retigabine (Potiga, RTG)  rufinamide (Banzel, RUF)  tiagabine (Gabatril,  TGB)  topiramate (Topamax, TPM)  viagabatrin, (Sabril, VGB)  vagal nerve stimulator (VNS)  valproic acid (Depakote, VPA)  zonisamide (Zonegran, ZNS)  Benzodiazepines  diazepam - rectal (Diastatl)  diazepam - oral (Valium, DZ)  clonazepam (Klonopin, CZP)  clorazepate (Tranxene, CLZ)  Ativan  Brain Stimulation  Vagal Nerve Stimulation-n/a  DBS- n/a     Compliance method  Memory - yes  Mom or Spouse - Yes  Pill Box - no  Edward calendar - no  Turn over medication bottle - no  Phone alarm - no     Seizure Evaluation  EEG Routine -   EEG Ambulatory -   EEG\Video Monitoring -   MRI/MRA -   CT/CTA Scan -   PET Scan -   Neuropsychological evaluation -   DEXA Scan     Potential Epilepsy Risk Factors:   Pregnancy/Labor/Delivery - full term uncomplicated pregnancy labor and vaginal delivery  Febrile seizures - none  Head injury  - none  CNS infection - none     Stroke - none  Family Hx of Sz - none     PAST MEDICAL HISTORY:           Active Ambulatory Problems     Diagnosis Date Noted    No Active Ambulatory Problems              Resolved Ambulatory Problems     Diagnosis Date Noted    No Resolved Ambulatory Problems      No Additional Past Medical History         PAST SURGICAL HISTORY: No past surgical history on file.      FAMILY HISTORY: No family history on file.       SOCIAL HISTORY:   Social History   Social History                Social History    Marital status: Single       Spouse name: N/A    Number of children: N/A    Years of education: N/A            Occupational History    Not on file.              Social History Main Topics    Smoking status: Current Every Day Smoker    Smokeless tobacco: Not on file    Alcohol use Not on file    Drug use: Not on file    Sexual activity: Not on file              Other Topics Concern    Not on file            Social History Narrative    No narrative on file            SUBSTANCE USE:  Social  "History              Social History Main Topics    Smoking status: Current Every Day Smoker    Smokeless tobacco: Not on file    Alcohol use Not on file    Drug use: Not on file    Sexual activity: Not on file              Social History   Substance Use Topics    Smoking status: Current Every Day Smoker    Smokeless tobacco: Not on file    Alcohol use Not on file         ALLERGIES: Review of patient's allergies indicates no known allergies.         Review of Systems   Constitutional: Negative for chills, diaphoresis, fever, malaise/fatigue and weight loss.   HENT: Negative for ear pain, hearing loss, nosebleeds and tinnitus.    Eyes: Negative for blurred vision, double vision, photophobia and pain.   Respiratory: Negative for cough, hemoptysis and shortness of breath.    Cardiovascular: Negative for chest pain, palpitations, orthopnea and leg swelling.   Gastrointestinal: Negative for abdominal pain, blood in stool, constipation, diarrhea, heartburn, nausea and vomiting.   Genitourinary: Negative for dysuria and hematuria.   Musculoskeletal: Negative for falls, joint pain and myalgias.   Skin: Negative for itching and rash.   Neurological: Positive for headaches. Negative for dizziness, tingling, tremors, sensory change, speech change, focal weakness, loss of consciousness and weakness.   Endo/Heme/Allergies: Negative for environmental allergies. Does not bruise/bleed easily.   Psychiatric/Behavioral: Negative for depression, hallucinations, memory loss and substance abuse. The patient is not nervous/anxious and does not have insomnia.             PHYSICAL EXAM:             Visit Vitals    /75    Pulse 85    Ht 6' 2" (1.88 m)    Wt 89.9 kg (198 lb 3.1 oz)    BMI 25.45 kg/m2            Neurologic Exam        Higher Cortical Function:    Patient is a well developed, pleasant, well groomed individual appearing their stated age  Oriented - intact to person, place and time    Fund of knowledge was " appropriate.    Language - Speech was fluent without evidence for an aphasia.  R-L Orientation - Intact   Agnosias, agraphesthesia, or astereognosis - not present.   Cranial Nerves II - XII:    EOMs were intact with normal smooth and no nystagmus.    PERRLA. Funduscopic exam - disc were flat with normal A/V ratio and no exudates or hemorrhages.   Visual fields were full to confrontation.    Motor - facial movement was symmetrical and normal.    Facial sensory - Light touch and pin prick sensations were normal.    Hearing was normal.  Palate moved well and was symmetrical    Tongue movement was full & the patient could speak without difficulty.  Motor: Power, bulk and tone were normal in all extremities.  Coordination:  Normal  Gait:  Normal     Deep tendon reflexes:    Reflex L R   Bicpets 2+ 2+   Tricepts 2+ 2+   Brachio-radialis 2+ 2+   Knee 2+ 2+   Ankle 2+ 2+   Babinski No No      Tremor: resting, postural, intentional - none  Cardiovascular:  No edema  Skin: No rash         I spent 60 minutes with the patient, of which 40 minutes was spent in direct face to face counseling in matters related to epilepsy, related safety issues, and antiepileptic drug therapy.          IMPRESSION  1.                   12 year h/o intractable epilepsy - partially controlled  2.                   S/P epilepsy surgery 3 yrs ago (Details unclear, has left cranial scar)  3.                   Incomplete database/poor historian  4.                   HA at site of craniotomy - improved         DISPOSITION:   1.                   Complete database by obtaining all old records from [place] including prior meds and details of surgical tx - 3rd attempt - pt will try also  2.                   Maintain LTG 250mg BID today but go back to IR formulation -Add Keppra 750mg BID  3.                   Seizure precautions.  4.                   State driving laws explained to patient.  5.                   Prescrible prn Viagra for ED - Refer to  primary care as well (Currently w/o cardiac signs/sx)

## 2018-11-07 ENCOUNTER — TELEPHONE (OUTPATIENT)
Dept: NEUROLOGY | Facility: CLINIC | Age: 38
End: 2018-11-07

## 2018-11-08 ENCOUNTER — TELEPHONE (OUTPATIENT)
Dept: NEUROLOGY | Facility: CLINIC | Age: 38
End: 2018-11-08

## 2019-01-25 ENCOUNTER — TELEPHONE (OUTPATIENT)
Dept: NEUROLOGY | Facility: CLINIC | Age: 39
End: 2019-01-25

## 2019-01-25 NOTE — TELEPHONE ENCOUNTER
----- Message from Tanya Maldonado sent at 1/25/2019  1:03 PM CST -----  Contact: PT  Pt called to speak to the nurse to schedule a work in [facility] due to needing medication refills and would like a call back today.    Pt can be reached at [phone].    Thanks

## 2019-02-01 RX ORDER — LAMOTRIGINE 100 MG/1
TABLET ORAL
Qty: 150 TABLET | Refills: 0 | OUTPATIENT
Start: 2019-02-01

## 2019-02-01 RX ORDER — LEVETIRACETAM 750 MG/1
TABLET ORAL
Qty: 60 TABLET | Refills: 0 | OUTPATIENT
Start: 2019-02-01

## 2019-02-01 NOTE — TELEPHONE ENCOUNTER
----- Message from Love Ponce sent at 2/1/2019  1:30 PM CST -----  Contact: pt  Pt needs a refill on  Lamotrigine and  Levetiracetam  called into pharmacy at [facility]   Pt can be reached at [phone]

## 2019-02-27 ENCOUNTER — OFFICE VISIT (OUTPATIENT)
Dept: NEUROLOGY | Facility: CLINIC | Age: 39
End: 2019-02-27
Payer: COMMERCIAL

## 2019-02-27 DIAGNOSIS — G40.219 PARTIAL SYMPTOMATIC EPILEPSY WITH COMPLEX PARTIAL SEIZURES, INTRACTABLE, WITHOUT STATUS EPILEPTICUS: Primary | ICD-10-CM

## 2019-02-27 PROCEDURE — 99213 OFFICE O/P EST LOW 20 MIN: CPT | Mod: S$GLB,,, | Performed by: PSYCHIATRY & NEUROLOGY

## 2019-02-27 PROCEDURE — 99213 PR OFFICE/OUTPT VISIT, EST, LEVL III, 20-29 MIN: ICD-10-PCS | Mod: S$GLB,,, | Performed by: PSYCHIATRY & NEUROLOGY

## 2019-02-27 NOTE — PROGRESS NOTES
INTERVAL HISTORY:  2/27/19:  Increase in seizure frequency in past 2 months   Including GTC in middle of afternoon without warning last week  Good AED compliance  No side effects and willing to increase meds  Nervous about blood draws--becomes presyncopal        2/17/18:  Seizures slightly improved, but no difference noted with LTG-ER formulation  Wants to go back to IR, due to cost  Willing to add back 2nd drug also for better control              12/15/17:  Seizure frequency increased  Several per month  Usually early AM, in bed  Convulsive. Girlfriend took video - clearly convulsive activity. (Looks epileptic)  Takes  BID consistently  C/O post-sz and med related erectile dysfunction           Date:    9/07/2016         HISTORY OF PRESENT ILLNESS (HPI)  The patient is a 37 y.o. WM   The patient was unaccompanied today seen for F/U since last year.      Pt was lost to F/U since last year.  He self-D/C'd all his AEDs and had increased seizure frequency in past few months.  He restarted only one of prior 3 drug regimen LTG - since and has maintained LTG 200mg BID since then  Has found same or better level of seizure control as on 3 drug regimen past few months since  Lives with girlfriend now - she notes minor sz with confusion upon awakening in AM's approx 1 per month at current time  No SE on LTG               Seizure Triggers  Sleep Deprivation - None  Other medications - None  Psych/stress - None  Photic stimulation - None  Hyperventilation - None  Medical Problems - None  Menses - No  Sensory Stimulation (light, sound, etc) - None  Missed dose of meds - None     AED Treatments  Present regimen  lamotrigine (Lamictal, LTG)  200mg BID            Prior treatments  oxcarbazepine (Trileptal OXC)  Vimpat 200mg BID  Keppra 500mg BID        Not tried  acetazolamide (Diamox, AZM)  amantadine  carbamazepine (Tegretol, CBZ)  clobezam (Onfi or Frizium, CLB)  ethosuximide (Zarontin, ESM)  eslicarbazine (Aptiom,  ESL)  felbamate (felbatol, FBM)  gabapentin (Neurontin, GBP)  methsuximide (Celontin, MSM)  methyphenytoin (Mesantion, MHT)  perampanel (Fycompa, FCP)   phenobarbital (Pb)  phenytoin (Dilantin, PHT)  pregabalin (Lyrica, PGB)  primidone (Mysoline, PRM)  retigabine (Potiga, RTG)  rufinamide (Banzel, RUF)  tiagabine (Gabatril,  TGB)  topiramate (Topamax, TPM)  viagabatrin, (Sabril, VGB)  vagal nerve stimulator (VNS)  valproic acid (Depakote, VPA)  zonisamide (Zonegran, ZNS)  Benzodiazepines  diazepam - rectal (Diastatl)  diazepam - oral (Valium, DZ)  clonazepam (Klonopin, CZP)  clorazepate (Tranxene, CLZ)  Ativan  Brain Stimulation  Vagal Nerve Stimulation-n/a  DBS- n/a     Compliance method  Memory - yes  Mom or Spouse - Yes  Pill Box - no  Edward calendar - no  Turn over medication bottle - no  Phone alarm - no     Seizure Evaluation  EEG Routine -   EEG Ambulatory -   EEG\Video Monitoring -   MRI/MRA -   CT/CTA Scan -   PET Scan -   Neuropsychological evaluation -   DEXA Scan     Potential Epilepsy Risk Factors:   Pregnancy/Labor/Delivery - full term uncomplicated pregnancy labor and vaginal delivery  Febrile seizures - none  Head injury  - none  CNS infection - none     Stroke - none  Family Hx of Sz - none     PAST MEDICAL HISTORY:           Active Ambulatory Problems     Diagnosis Date Noted    No Active Ambulatory Problems              Resolved Ambulatory Problems     Diagnosis Date Noted    No Resolved Ambulatory Problems      No Additional Past Medical History         PAST SURGICAL HISTORY: No past surgical history on file.      FAMILY HISTORY: No family history on file.       SOCIAL HISTORY:   Social History   Social History                Social History    Marital status: Single       Spouse name: N/A    Number of children: N/A    Years of education: N/A            Occupational History    Not on file.              Social History Main Topics    Smoking status: Current Every Day Smoker    Smokeless  "tobacco: Not on file    Alcohol use Not on file    Drug use: Not on file    Sexual activity: Not on file              Other Topics Concern    Not on file            Social History Narrative    No narrative on file            SUBSTANCE USE:  Social History              Social History Main Topics    Smoking status: Current Every Day Smoker    Smokeless tobacco: Not on file    Alcohol use Not on file    Drug use: Not on file    Sexual activity: Not on file              Social History   Substance Use Topics    Smoking status: Current Every Day Smoker    Smokeless tobacco: Not on file    Alcohol use Not on file         ALLERGIES: Review of patient's allergies indicates no known allergies.         Review of Systems   Constitutional: Negative for chills, diaphoresis, fever, malaise/fatigue and weight loss.   HENT: Negative for ear pain, hearing loss, nosebleeds and tinnitus.    Eyes: Negative for blurred vision, double vision, photophobia and pain.   Respiratory: Negative for cough, hemoptysis and shortness of breath.    Cardiovascular: Negative for chest pain, palpitations, orthopnea and leg swelling.   Gastrointestinal: Negative for abdominal pain, blood in stool, constipation, diarrhea, heartburn, nausea and vomiting.   Genitourinary: Negative for dysuria and hematuria.   Musculoskeletal: Negative for falls, joint pain and myalgias.   Skin: Negative for itching and rash.   Neurological: Positive for headaches. Negative for dizziness, tingling, tremors, sensory change, speech change, focal weakness, loss of consciousness and weakness.   Endo/Heme/Allergies: Negative for environmental allergies. Does not bruise/bleed easily.   Psychiatric/Behavioral: Negative for depression, hallucinations, memory loss and substance abuse. The patient is not nervous/anxious and does not have insomnia.             PHYSICAL EXAM:             Visit Vitals    /75    Pulse 85    Ht 6' 2" (1.88 m)    Wt 89.9 kg (198 lb " 3.1 oz)    BMI 25.45 kg/m2            Neurologic Exam        Higher Cortical Function:    Patient is a well developed, pleasant, well groomed individual appearing their stated age  Oriented - intact to person, place and time    Fund of knowledge was appropriate.    Language - Speech was fluent without evidence for an aphasia.  R-L Orientation - Intact   Agnosias, agraphesthesia, or astereognosis - not present.   Cranial Nerves II - XII:    EOMs were intact with normal smooth and no nystagmus.    PERRLA. Funduscopic exam - disc were flat with normal A/V ratio and no exudates or hemorrhages.   Visual fields were full to confrontation.    Motor - facial movement was symmetrical and normal.    Facial sensory - Light touch and pin prick sensations were normal.    Hearing was normal.  Palate moved well and was symmetrical    Tongue movement was full & the patient could speak without difficulty.  Motor: Power, bulk and tone were normal in all extremities.  Coordination:  Normal  Gait:  Normal     Deep tendon reflexes:    Reflex L R   Bicpets 2+ 2+   Tricepts 2+ 2+   Brachio-radialis 2+ 2+   Knee 2+ 2+   Ankle 2+ 2+   Babinski No No      Tremor: resting, postural, intentional - none  Cardiovascular:  No edema  Skin: No rash         I spent 60 minutes with the patient, of which 40 minutes was spent in direct face to face counseling in matters related to epilepsy, related safety issues, and antiepileptic drug therapy.           IMPRESSION  1.                   12 year h/o intractable epilepsy - partially controlled - worsened recently  2.                   S/P epilepsy surgery 3 yrs ago (Details unclear, has left cranial scar)  3.                   Incomplete database/poor historian  4.                   HA at site of craniotomy - improved         DISPOSITION:   1.                   Complete database by obtaining all old records from [place] including prior meds and details of surgical tx - 3rd attempt - pt will try  also  2.                   Maintain LTG 250mg BID today and increase Keppra from 750mg BID to 1500 BID over next 2 weeks, then get labs and reasses  3.                   Seizure precautions.  4.                   State driving laws explained to patient.  5.                   Prescrible prn Viagra for ED - Refer to primary care as well (Currently w/o cardiac signs/sx)

## 2019-03-18 ENCOUNTER — TELEPHONE (OUTPATIENT)
Dept: NEUROLOGY | Facility: CLINIC | Age: 39
End: 2019-03-18

## 2019-03-18 NOTE — TELEPHONE ENCOUNTER
----- Message from Rad Phillips sent at 3/18/2019  2:14 PM CDT -----  Needs Advice    Reason for call: Pt is asking levETIRAcetam (KEPPRA) 750 MG Tab and lamoTRIgine (LAMICTAL) 100 MG tablet go to pharmacy below, instead of [facility] [address] ([facility] said they did not have the prescriptions in their system)        Communication Preference: [phone]    Additional Information:    [facility] [address] [place] [address] [address]AM[address] [address]  Phone: [phone] Fax: [phone]

## 2019-03-19 ENCOUNTER — TELEPHONE (OUTPATIENT)
Dept: NEUROLOGY | Facility: CLINIC | Age: 39
End: 2019-03-19

## 2019-03-19 NOTE — TELEPHONE ENCOUNTER
----- Message from Rad Phillips sent at 3/19/2019  3:28 PM CDT -----  Pharmacy Calling    Reason for call: [facility] RX: Rx clarification    Pharmacy Name: [facility]    Prescription Name: lamoTRIgine (LAMICTAL)    Phone Number:     Additional Information: Pharmacy states pt is there now

## 2019-04-04 RX ORDER — LEVETIRACETAM 750 MG/1
TABLET ORAL
Qty: 60 TABLET | Refills: 0 | OUTPATIENT
Start: 2019-04-04

## 2019-04-18 ENCOUNTER — TELEPHONE (OUTPATIENT)
Dept: NEUROLOGY | Facility: CLINIC | Age: 39
End: 2019-04-18

## 2019-04-18 DIAGNOSIS — R56.9 SEIZURES: Primary | ICD-10-CM

## 2019-04-18 NOTE — TELEPHONE ENCOUNTER
Called in Keppra 750mg 2 tabs BID to [facility]. Pt will have labs drawn within a few days. Scheduled to see Sallie

## 2019-04-18 NOTE — TELEPHONE ENCOUNTER
----- Message from Rad Phillips sent at 4/18/2019 10:12 AM CDT -----  Needs Advice    Reason for call: Pt is asking to speak w/ someone on staff asap about lamoTRIgine (LAMICTAL) 100 MG tablet and levETIRAcetam (KEPPRA) 750 MG Tab, due to pt experiencing a seizure yesterday        Communication Preference: [phone]    Additional Information:

## 2019-05-13 ENCOUNTER — OFFICE VISIT (OUTPATIENT)
Dept: NEUROLOGY | Facility: CLINIC | Age: 39
End: 2019-05-13
Payer: COMMERCIAL

## 2019-05-13 VITALS
HEIGHT: 75 IN | WEIGHT: 165.56 LBS | SYSTOLIC BLOOD PRESSURE: 121 MMHG | HEART RATE: 83 BPM | DIASTOLIC BLOOD PRESSURE: 57 MMHG | BODY MASS INDEX: 20.59 KG/M2

## 2019-05-13 DIAGNOSIS — G40.219 PARTIAL SYMPTOMATIC EPILEPSY WITH COMPLEX PARTIAL SEIZURES, INTRACTABLE, WITHOUT STATUS EPILEPTICUS: Primary | ICD-10-CM

## 2019-05-13 PROCEDURE — 99214 PR OFFICE/OUTPT VISIT, EST, LEVL IV, 30-39 MIN: ICD-10-PCS | Mod: S$GLB,,, | Performed by: PSYCHIATRY & NEUROLOGY

## 2019-05-13 PROCEDURE — 99214 OFFICE O/P EST MOD 30 MIN: CPT | Mod: S$GLB,,, | Performed by: PSYCHIATRY & NEUROLOGY

## 2019-05-13 PROCEDURE — 3008F PR BODY MASS INDEX (BMI) DOCUMENTED: ICD-10-PCS | Mod: CPTII,S$GLB,, | Performed by: PSYCHIATRY & NEUROLOGY

## 2019-05-13 PROCEDURE — 99999 PR PBB SHADOW E&M-EST. PATIENT-LVL III: ICD-10-PCS | Mod: PBBFAC,,, | Performed by: PSYCHIATRY & NEUROLOGY

## 2019-05-13 PROCEDURE — 99999 PR PBB SHADOW E&M-EST. PATIENT-LVL III: CPT | Mod: PBBFAC,,, | Performed by: PSYCHIATRY & NEUROLOGY

## 2019-05-13 PROCEDURE — 3008F BODY MASS INDEX DOCD: CPT | Mod: CPTII,S$GLB,, | Performed by: PSYCHIATRY & NEUROLOGY

## 2019-05-13 NOTE — LETTER
May 13, 2019      [facility] - Neurology Epilepsy  [address]  [facility] [address]  Phone: [phone]  Fax: [phone]       Patient: Darren Nicolas   YOB: 1980  Date of Visit: 05/13/2019    To Whom It May Concern:    Mr. Nicolas  was at [facility] on 05/13/2019. He may return to work on 05/14/2019 with no restrictions. If you have any questions or concerns, or if I can be of further assistance, please do not hesitate to contact me.    Sincerely,    uLi Rizo MD

## 2019-05-13 NOTE — PROGRESS NOTES
INTERVAL HISTORY:  5/13/19:  Had GTC at work on April 17  Was parking car (Should not have been driving--was not seizure free)  Back to baseline now  Has also been nauseous and losing weight for unclear reasons  Absorbption of AEDs may be impacted  Now understands that he IS NOT to drive but wishes to return to work        2/27/19:  Increase in seizure frequency in past 2 months   Including GTC in middle of afternoon without warning last week  Good AED compliance  No side effects and willing to increase meds  Nervous about blood draws--becomes presyncopal           2/17/18:  Seizures slightly improved, but no difference noted with LTG-ER formulation  Wants to go back to IR, due to cost  Willing to add back 2nd drug also for better control              12/15/17:  Seizure frequency increased  Several per month  Usually early AM, in bed  Convulsive. Girlfriend took video - clearly convulsive activity. (Looks epileptic)  Takes  BID consistently  C/O post-sz and med related erectile dysfunction           Date:    9/07/2016         HISTORY OF PRESENT ILLNESS (HPI)  The patient is a 37 y.o. WM   The patient was unaccompanied today seen for F/U since last year.      Pt was lost to F/U since last year.  He self-D/C'd all his AEDs and had increased seizure frequency in past few months.  He restarted only one of prior 3 drug regimen LTG - since and has maintained LTG 200mg BID since then  Has found same or better level of seizure control as on 3 drug regimen past few months since  Lives with girlfriend now - she notes minor sz with confusion upon awakening in AM's approx 1 per month at current time  No SE on LTG               Seizure Triggers  Sleep Deprivation - None  Other medications - None  Psych/stress - None  Photic stimulation - None  Hyperventilation - None  Medical Problems - None  Menses - No  Sensory Stimulation (light, sound, etc) - None  Missed dose of meds - None     AED Treatments  Present  regimen  lamotrigine (Lamictal, LTG)  200mg BID            Prior treatments  oxcarbazepine (Trileptal OXC)  Vimpat 200mg BID  Keppra 500mg BID        Not tried  acetazolamide (Diamox, AZM)  amantadine  carbamazepine (Tegretol, CBZ)  clobezam (Onfi or Frizium, CLB)  ethosuximide (Zarontin, ESM)  eslicarbazine (Aptiom, ESL)  felbamate (felbatol, FBM)  gabapentin (Neurontin, GBP)  methsuximide (Celontin, MSM)  methyphenytoin (Mesantion, MHT)  perampanel (Fycompa, FCP)   phenobarbital (Pb)  phenytoin (Dilantin, PHT)  pregabalin (Lyrica, PGB)  primidone (Mysoline, PRM)  retigabine (Potiga, RTG)  rufinamide (Banzel, RUF)  tiagabine (Gabatril,  TGB)  topiramate (Topamax, TPM)  viagabatrin, (Sabril, VGB)  vagal nerve stimulator (VNS)  valproic acid (Depakote, VPA)  zonisamide (Zonegran, ZNS)  Benzodiazepines  diazepam - rectal (Diastatl)  diazepam - oral (Valium, DZ)  clonazepam (Klonopin, CZP)  clorazepate (Tranxene, CLZ)  Ativan  Brain Stimulation  Vagal Nerve Stimulation-n/a  DBS- n/a     Compliance method  Memory - yes  Mom or Spouse - Yes  Pill Box - no  Edward calendar - no  Turn over medication bottle - no  Phone alarm - no     Seizure Evaluation  EEG Routine -   EEG Ambulatory -   EEG\Video Monitoring -   MRI/MRA -   CT/CTA Scan -   PET Scan -   Neuropsychological evaluation -   DEXA Scan     Potential Epilepsy Risk Factors:   Pregnancy/Labor/Delivery - full term uncomplicated pregnancy labor and vaginal delivery  Febrile seizures - none  Head injury  - none  CNS infection - none     Stroke - none  Family Hx of Sz - none     PAST MEDICAL HISTORY:           Active Ambulatory Problems     Diagnosis Date Noted    No Active Ambulatory Problems              Resolved Ambulatory Problems     Diagnosis Date Noted    No Resolved Ambulatory Problems      No Additional Past Medical History         PAST SURGICAL HISTORY: No past surgical history on file.      FAMILY HISTORY: No family history on file.       SOCIAL HISTORY:        Social History    Social History                Social History    Marital status: Single       Spouse name: N/A    Number of children: N/A    Years of education: N/A            Occupational History    Not on file.              Social History Main Topics    Smoking status: Current Every Day Smoker    Smokeless tobacco: Not on file    Alcohol use Not on file    Drug use: Not on file    Sexual activity: Not on file              Other Topics Concern    Not on file            Social History Narrative    No narrative on file            SUBSTANCE USE:  Social History              Social History Main Topics    Smoking status: Current Every Day Smoker    Smokeless tobacco: Not on file    Alcohol use Not on file    Drug use: Not on file    Sexual activity: Not on file              Social History   Substance Use Topics    Smoking status: Current Every Day Smoker    Smokeless tobacco: Not on file    Alcohol use Not on file         ALLERGIES: Review of patient's allergies indicates no known allergies.         Review of Systems   Constitutional: Negative for chills, diaphoresis, fever, malaise/fatigue and weight loss.   HENT: Negative for ear pain, hearing loss, nosebleeds and tinnitus.    Eyes: Negative for blurred vision, double vision, photophobia and pain.   Respiratory: Negative for cough, hemoptysis and shortness of breath.    Cardiovascular: Negative for chest pain, palpitations, orthopnea and leg swelling.   Gastrointestinal: Negative for abdominal pain, blood in stool, constipation, diarrhea, heartburn, nausea and vomiting.   Genitourinary: Negative for dysuria and hematuria.   Musculoskeletal: Negative for falls, joint pain and myalgias.   Skin: Negative for itching and rash.   Neurological: Positive for headaches. Negative for dizziness, tingling, tremors, sensory change, speech change, focal weakness, loss of consciousness and weakness.   Endo/Heme/Allergies: Negative for environmental allergies.  "Does not bruise/bleed easily.   Psychiatric/Behavioral: Negative for depression, hallucinations, memory loss and substance abuse. The patient is not nervous/anxious and does not have insomnia.             PHYSICAL EXAM:             Visit Vitals    /75    Pulse 85    Ht 6' 2" (1.88 m)    Wt 89.9 kg (198 lb 3.1 oz)    BMI 25.45 kg/m2            Neurologic Exam        Higher Cortical Function:    Patient is a well developed, pleasant, well groomed individual appearing their stated age  Oriented - intact to person, place and time    Fund of knowledge was appropriate.    Language - Speech was fluent without evidence for an aphasia.  R-L Orientation - Intact   Agnosias, agraphesthesia, or astereognosis - not present.   Cranial Nerves II - XII:    EOMs were intact with normal smooth and no nystagmus.    PERRLA. Funduscopic exam - disc were flat with normal A/V ratio and no exudates or hemorrhages.   Visual fields were full to confrontation.    Motor - facial movement was symmetrical and normal.    Facial sensory - Light touch and pin prick sensations were normal.    Hearing was normal.  Palate moved well and was symmetrical    Tongue movement was full & the patient could speak without difficulty.  Motor: Power, bulk and tone were normal in all extremities.  Coordination:  Normal  Gait:  Normal     Deep tendon reflexes:    Reflex L R   Bicpets 2+ 2+   Tricepts 2+ 2+   Brachio-radialis 2+ 2+   Knee 2+ 2+   Ankle 2+ 2+   Babinski No No      Tremor: resting, postural, intentional - none  Cardiovascular:  No edema  Skin: No rash         I spent 60 minutes with the patient, of which 40 minutes was spent in direct face to face counseling in matters related to epilepsy, related safety issues, and antiepileptic drug therapy.           IMPRESSION  1.                   12 year h/o intractable epilepsy - partially controlled - worsened recently  2.                   S/P epilepsy surgery 3 yrs ago (Details unclear, has left " cranial scar)  3.                   Incomplete database/poor historian  4.                   HA at site of craniotomy - improved         DISPOSITION:   1.                   Maintain LTG 250mg BID  2.                   Maintain Keppra from 1500 BID   3.                   Seizure precautions.  4.                   State driving laws explained to patient again today and he voiced his understanding  5.                   Drug labs today

## 2019-05-20 ENCOUNTER — TELEPHONE (OUTPATIENT)
Dept: NEUROLOGY | Facility: CLINIC | Age: 39
End: 2019-05-20

## 2019-05-20 NOTE — TELEPHONE ENCOUNTER
I returned call of lab. The B2 riboflavin wasn't handled properly(not spun or placed in light sensitive container). I will inform Dr. Rizo and see if pt needs redraw        ----- Message from Beth Cross MA sent at 5/20/2019 12:30 PM CDT -----  Can you call and see what this is about    ----- Message -----  From: Vernon Guidry  Sent: 5/20/2019  12:18 PM  To: Lui Rizo MD, Sallie ARITA Staff    There was an issue with a test ordered on this patient from 5/13/2019.  Please call the [facility] lab as soon as possible at [phone] ext. [phone] for complete details.  Anyone in the [facility] lab will be able to assist you with further information.

## 2019-05-22 ENCOUNTER — TELEPHONE (OUTPATIENT)
Dept: NEUROLOGY | Facility: CLINIC | Age: 39
End: 2019-05-22

## 2019-05-22 NOTE — TELEPHONE ENCOUNTER
Dr. Rizo was notified that B2 wasn't done due to lab error. All other labs came back WNL. He will not reorder, but wait for the GI consult results. Pt was instructed to contact the GI group and orders were placed. No appt has been made yet. I sent a message to this group to please contact pt for an appt.

## 2019-05-28 ENCOUNTER — TELEPHONE (OUTPATIENT)
Dept: NEUROLOGY | Facility: CLINIC | Age: 39
End: 2019-05-28

## 2019-06-14 ENCOUNTER — LAB VISIT (OUTPATIENT)
Dept: LAB | Facility: HOSPITAL | Age: 39
End: 2019-06-14
Attending: INTERNAL MEDICINE
Payer: COMMERCIAL

## 2019-06-14 ENCOUNTER — OFFICE VISIT (OUTPATIENT)
Dept: GASTROENTEROLOGY | Facility: CLINIC | Age: 39
End: 2019-06-14
Payer: COMMERCIAL

## 2019-06-14 VITALS
WEIGHT: 162.06 LBS | BODY MASS INDEX: 20.8 KG/M2 | DIASTOLIC BLOOD PRESSURE: 65 MMHG | SYSTOLIC BLOOD PRESSURE: 106 MMHG | HEART RATE: 69 BPM | HEIGHT: 74 IN

## 2019-06-14 DIAGNOSIS — R11.15 NON-INTRACTABLE CYCLICAL VOMITING WITH NAUSEA: Primary | ICD-10-CM

## 2019-06-14 DIAGNOSIS — R63.4 WEIGHT LOSS: ICD-10-CM

## 2019-06-14 DIAGNOSIS — R11.15 NON-INTRACTABLE CYCLICAL VOMITING WITH NAUSEA: ICD-10-CM

## 2019-06-14 LAB — HIV 1+2 AB+HIV1 P24 AG SERPL QL IA: NEGATIVE

## 2019-06-14 PROCEDURE — 99999 PR PBB SHADOW E&M-EST. PATIENT-LVL III: ICD-10-PCS | Mod: PBBFAC,,, | Performed by: INTERNAL MEDICINE

## 2019-06-14 PROCEDURE — 83516 IMMUNOASSAY NONANTIBODY: CPT | Mod: 59

## 2019-06-14 PROCEDURE — 3008F BODY MASS INDEX DOCD: CPT | Mod: CPTII,S$GLB,, | Performed by: INTERNAL MEDICINE

## 2019-06-14 PROCEDURE — 99204 OFFICE O/P NEW MOD 45 MIN: CPT | Mod: S$GLB,,, | Performed by: INTERNAL MEDICINE

## 2019-06-14 PROCEDURE — 86703 HIV-1/HIV-2 1 RESULT ANTBDY: CPT

## 2019-06-14 PROCEDURE — 99204 PR OFFICE/OUTPT VISIT, NEW, LEVL IV, 45-59 MIN: ICD-10-PCS | Mod: S$GLB,,, | Performed by: INTERNAL MEDICINE

## 2019-06-14 PROCEDURE — 3008F PR BODY MASS INDEX (BMI) DOCUMENTED: ICD-10-PCS | Mod: CPTII,S$GLB,, | Performed by: INTERNAL MEDICINE

## 2019-06-14 PROCEDURE — 99999 PR PBB SHADOW E&M-EST. PATIENT-LVL III: CPT | Mod: PBBFAC,,, | Performed by: INTERNAL MEDICINE

## 2019-06-14 RX ORDER — LEVETIRACETAM 750 MG/1
TABLET ORAL 2 TIMES DAILY
Refills: 1 | COMMUNITY
Start: 2019-05-26 | End: 2020-08-03 | Stop reason: SDUPTHER

## 2019-06-14 RX ORDER — LAMOTRIGINE 200 MG/1
250 TABLET ORAL 2 TIMES DAILY
COMMUNITY
End: 2020-05-26 | Stop reason: SDUPTHER

## 2019-06-14 NOTE — PROGRESS NOTES
[facility] Consultation Note    Reason for Consult:    Chief Complaint   Patient presents with    Weight Loss    Nausea    Emesis       PCP:   Smita Phillips    Referring MD:  Lui Rizo Md  [address] [place] [address]    HPI:  Darren Nicolas is a 39 y.o. male here for evaluation of nausea and vomiting. He is new to our clinic.  His nausea and vomiting has been intermittent.  It may have been going on for a few years.  It can be severe at times lasting up to a week.  He may go for a couple of weeks without any symptoms.  Symptoms are worse after eating late at night, mostly large meals.  However, between episodes he does not have any problems eating.  He feels generally normal between episodes.  Very hot showers help his symptoms and can prevent vomiting. He admits to smoking marijuana.  This also seems worse when he has seizures.  He has experienced weight loss of up to 100 lb over the last 3 years.  He has been treated for seizures with several medications.  One of these medications was CBD oil drops that actually made his vomiting worse.  He reports that his bowel movements are regular and there have been no changes in his bowel movements.  There are no changes in bowel movements during symptoms.  His stools are formed usually having them on a once daily basis mostly in the morning.          ROS:  Constitutional: No fevers, chills, No weight loss, normal appetite  ENT: No congestion, rhinorrhea, or chronic sinus problems  CV: No chest pain or palpitations  Pulm: No cough, No shortness of breath  Ophtho: No vision changes or pain  GI: see HPI  Derm: No rash or lesions  Heme: No lymphadenopathy, No bruising  MSK: No arthritis or joint swelling  : No dysuria, No frequent urination  Endo: No hot or cold intolerance  Psych: No anxiety, No depression      Medical History:  has a past medical history of Seizure.    Surgical History:  has no past surgical history on  "file.    Family History: family history is not on file.    Social History:  reports that he has been smoking.  He does not have any smokeless tobacco history on file.    Review of patient's allergies indicates:  No Known Allergies    Prior to Admission medications    Medication Sig Start Date End Date Taking? Authorizing Provider   lamoTRIgine (LAMICTAL) 200 MG tablet Take 250 mg by mouth 2 (two) times daily.   Yes Historical Provider, MD   levETIRAcetam (KEPPRA) 750 MG Tab 2 (two) times daily. 5/26/19  Yes Historical Provider, MD   MAGNESIUM ORAL Take by mouth 2 (two) times daily.   Yes Historical Provider, MD   hyoscyamine (LEVSIN/SL) 0.125 mg Subl Place 1 tablet (0.125 mg total) under the tongue every 6 (six) hours as needed (nausea and cramps). 7/25/17   Lui Rizo MD   sildenafil (VIAGRA) 50 MG tablet Take 1 tablet (50 mg total) by mouth daily as needed for Erectile Dysfunction. 12/15/17 12/15/18  Lui Rizo MD       Objective Findings:  Vital Signs:  /65   Pulse 69   Ht 6' 2" (1.88 m)   Wt 73.5 kg (162 lb 0.6 oz)   BMI 20.80 kg/m²   Body mass index is 20.8 kg/m².    Physical Exam:  General Appearance:  Well appearing in no acute distress, appears stated age  Head:  Normocephalic, atraumatic  Eyes:  No scleral icterus or pallor, EOMI  ENT:  Neck supple, moist mucosa; OP clear  Lungs:  CTA bilaterally in anterior and posterior fields, no wheezes, no crackles; no dullness  Heart:  Regular rate and rhythm, S1, S2 normal, no murmurs heard  Abdomen:  Soft, non tender, non distended with positive bowel sounds in all four quadrants. No hepatosplenomegaly, ascites, or mass  Extremities:  No clubbing, cyanosis, or edema  Skin:  No rash      Labs:  Lab Results   Component Value Date    WBC 9.30 05/13/2019    HGB 15.4 05/13/2019    HCT 47.2 05/13/2019     (H) 05/13/2019    RDW 12.6 05/13/2019     05/13/2019    GRAN 5.0 05/13/2019    GRAN 53.4 05/13/2019    LYMPH 2.9 05/13/2019    " LYMPH 31.4 05/13/2019    MONO 1.1 (H) 05/13/2019    MONO 11.9 05/13/2019    EOS 0.2 05/13/2019    BASO 0.06 05/13/2019     Lab Results   Component Value Date     05/13/2019    K 4.2 05/13/2019     05/13/2019    CO2 26 05/13/2019     05/13/2019    BUN 22 (H) 05/13/2019    CREATININE 1.0 05/13/2019    CALCIUM 9.7 05/13/2019    PROT 7.1 05/13/2019    ALBUMIN 4.1 05/13/2019    BILITOT 0.3 05/13/2019    ALKPHOS 74 05/13/2019    AST 23 05/13/2019    ALT 17 05/13/2019                 Assessment:  Darren Nicolas is a 39 y.o. male with:  1. Non-intractable cyclical vomiting with nausea    2. Weight loss      Patient with recurrence vomiting episodes with periods of normal between which appears consistent with a cyclical type of vomiting syndrome.  While he is on seizure medicines which may have side effects of nausea; however, I do not feel that his symptoms are necessarily medication related.  He smokes marijuana and his symptoms do improve with very hot showers which suggests cannabis hyperemesis syndrome.  Other considerations can include H pylori infection, cholelithiasis or biliary disease, or celiac disease.  He has significant weight loss over the last 3 years following brain surgery.  At this time I see no clear convincing signs of intestinal malabsorption.  If malabsorption were present, I would have expected more significant changes in his bowel pattern.  Also would not expect for him to feel normal between episodes of nausea and vomiting. At this time I cannot completely explain his weight loss.          Recommendations/Plan:  1.  I will get an ultrasound of the abdomen to allow biliary and gallbladder disease.  2.  I will check stool for H pylori antigen, fecal qualitative fat, and fecal elastase.  3.  I will check an HIV test as well as QuantiFERON gold  4.  If the above testing is on revealing colon then I would perform EGD and colonoscopy.  5.  I recommend he consider reducing or even  eliminating marijuana use to see if symptoms reza.    Follow-up in pending the above      Order summary:  Orders Placed This Encounter    US Abdomen Complete    H. pylori antigen, stool    Celiac Disease Panel    HIV 1/2 Ag/Ab (4th Gen)    Fecal fat, qualitative    Pancreatic elastase, fecal    Quantiferon Gold TB         Thank you so much for allowing me to participate in the care of Darren Guillen MD

## 2019-06-17 LAB
GLIADIN PEPTIDE IGA SER-ACNC: 6 UNITS
GLIADIN PEPTIDE IGG SER-ACNC: 4 UNITS
IGA SERPL-MCNC: 97 MG/DL (ref 70–400)
TTG IGA SER-ACNC: 3 UNITS
TTG IGG SER-ACNC: 2 UNITS

## 2019-06-26 RX ORDER — LEVETIRACETAM 750 MG/1
TABLET ORAL
Qty: 120 TABLET | Refills: 0 | OUTPATIENT
Start: 2019-06-26

## 2019-07-02 ENCOUNTER — TELEPHONE (OUTPATIENT)
Dept: NEUROLOGY | Facility: CLINIC | Age: 39
End: 2019-07-02

## 2019-07-02 NOTE — TELEPHONE ENCOUNTER
----- Message from Yulissa James sent at 7/2/2019 12:20 PM CDT -----  Rx Refill/Request     Is this a Refill or New Rx:  Refill    Rx Name and Strength:  levETIRAcetam (KEPPRA) 750 MG Tab    Preferred Pharmacy with phone number:     [email or web address] [address] [address] [facility] [place]  [address] [place] [id number]  Phone: [phone] Fax: [phone]    Communication Preference:pt @ [phone]  Additional Information: asking to have done today, says he is completely out of he medication. Please call.

## 2019-07-26 RX ORDER — LEVETIRACETAM 750 MG/1
TABLET ORAL
Qty: 120 TABLET | Refills: 0 | OUTPATIENT
Start: 2019-07-26

## 2019-07-29 ENCOUNTER — TELEPHONE (OUTPATIENT)
Dept: NEUROLOGY | Facility: CLINIC | Age: 39
End: 2019-07-29

## 2019-10-24 RX ORDER — LAMOTRIGINE 100 MG/1
TABLET ORAL
Qty: 150 TABLET | Refills: 0 | OUTPATIENT
Start: 2019-10-24

## 2019-10-25 NOTE — TELEPHONE ENCOUNTER
----- Message from Stephon Bettencourt sent at 10/25/2019 12:22 PM CDT -----  Contact: Patient   Rx Refill/Request     Is this a Refill or New Rx:  Yes   Rx Name and Strength:  lamoTRIgine (LAMICTAL) ? MG tablet    Preferred Pharmacy with phone number:     [id number] [address] [place]  [address] [place] [id number]  Phone: [phone] Fax: [phone]

## 2019-11-22 ENCOUNTER — OFFICE VISIT (OUTPATIENT)
Dept: NEUROLOGY | Facility: CLINIC | Age: 39
End: 2019-11-22
Payer: COMMERCIAL

## 2019-11-22 ENCOUNTER — TELEPHONE (OUTPATIENT)
Dept: NEUROLOGY | Facility: CLINIC | Age: 39
End: 2019-11-22

## 2019-11-22 VITALS
HEART RATE: 79 BPM | SYSTOLIC BLOOD PRESSURE: 133 MMHG | BODY MASS INDEX: 21.64 KG/M2 | HEIGHT: 74 IN | DIASTOLIC BLOOD PRESSURE: 68 MMHG | WEIGHT: 168.63 LBS

## 2019-11-22 DIAGNOSIS — G40.219 PARTIAL SYMPTOMATIC EPILEPSY WITH COMPLEX PARTIAL SEIZURES, INTRACTABLE, WITHOUT STATUS EPILEPTICUS: Primary | ICD-10-CM

## 2019-11-22 DIAGNOSIS — R56.9 SEIZURES: Primary | ICD-10-CM

## 2019-11-22 PROCEDURE — 3008F PR BODY MASS INDEX (BMI) DOCUMENTED: ICD-10-PCS | Mod: CPTII,S$GLB,, | Performed by: PSYCHIATRY & NEUROLOGY

## 2019-11-22 PROCEDURE — 99999 PR PBB SHADOW E&M-EST. PATIENT-LVL III: CPT | Mod: PBBFAC,,, | Performed by: PSYCHIATRY & NEUROLOGY

## 2019-11-22 PROCEDURE — 3008F BODY MASS INDEX DOCD: CPT | Mod: CPTII,S$GLB,, | Performed by: PSYCHIATRY & NEUROLOGY

## 2019-11-22 PROCEDURE — 99213 OFFICE O/P EST LOW 20 MIN: CPT | Mod: S$GLB,,, | Performed by: PSYCHIATRY & NEUROLOGY

## 2019-11-22 PROCEDURE — 99999 PR PBB SHADOW E&M-EST. PATIENT-LVL III: ICD-10-PCS | Mod: PBBFAC,,, | Performed by: PSYCHIATRY & NEUROLOGY

## 2019-11-22 PROCEDURE — 99213 PR OFFICE/OUTPT VISIT, EST, LEVL III, 20-29 MIN: ICD-10-PCS | Mod: S$GLB,,, | Performed by: PSYCHIATRY & NEUROLOGY

## 2019-11-22 NOTE — TELEPHONE ENCOUNTER
keppra 750-2 BID  Lamotrigine 100mg-4 BID    Both plus 5 refills called into [facility]    He was given a taper schedule for the new lamictal    Labs to be done one month after goal reached

## 2019-11-22 NOTE — PROGRESS NOTES
INTERVAL HISTORY:  11/22/19:  Accidentally doubled LTG dose due to change in pill size  Was supposed to be taking  BID and took 500 BID for 2 weeks before discovering error and reducing   States that during that time had much better seizure control although was slightly more tired and with episodic double vision  Interesting in increasing maintenance dose because back on regular dose, seizures have resumed at 1-2/wk partial        5/13/19:  Had GTC at work on April 17  Was parking car (Should not have been driving--was not seizure free)  Back to baseline now  Has also been nauseous and losing weight for unclear reasons  Absorbption of AEDs may be impacted  Now understands that he IS NOT to drive but wishes to return to work           2/27/19:  Increase in seizure frequency in past 2 months   Including GTC in middle of afternoon without warning last week  Good AED compliance  No side effects and willing to increase meds  Nervous about blood draws--becomes presyncopal           2/17/18:  Seizures slightly improved, but no difference noted with LTG-ER formulation  Wants to go back to IR, due to cost  Willing to add back 2nd drug also for better control              12/15/17:  Seizure frequency increased  Several per month  Usually early AM, in bed  Convulsive. Girlfriend took video - clearly convulsive activity. (Looks epileptic)  Takes  BID consistently  C/O post-sz and med related erectile dysfunction           Date:    9/07/2016         HISTORY OF PRESENT ILLNESS (HPI)  The patient is a 37 y.o. WM   The patient was unaccompanied today seen for F/U since last year.      Pt was lost to F/U since last year.  He self-D/C'd all his AEDs and had increased seizure frequency in past few months.  He restarted only one of prior 3 drug regimen LTG - since and has maintained LTG 200mg BID since then  Has found same or better level of seizure control as on 3 drug regimen past few months since  Lives with girlfriend  now - she notes minor sz with confusion upon awakening in AM's approx 1 per month at current time  No SE on LTG               Seizure Triggers  Sleep Deprivation - None  Other medications - None  Psych/stress - None  Photic stimulation - None  Hyperventilation - None  Medical Problems - None  Menses - No  Sensory Stimulation (light, sound, etc) - None  Missed dose of meds - None     AED Treatments  Present regimen  lamotrigine (Lamictal, LTG)  200mg BID            Prior treatments  oxcarbazepine (Trileptal OXC)  Vimpat 200mg BID  Keppra 500mg BID        Not tried  acetazolamide (Diamox, AZM)  amantadine  carbamazepine (Tegretol, CBZ)  clobezam (Onfi or Frizium, CLB)  ethosuximide (Zarontin, ESM)  eslicarbazine (Aptiom, ESL)  felbamate (felbatol, FBM)  gabapentin (Neurontin, GBP)  methsuximide (Celontin, MSM)  methyphenytoin (Mesantion, MHT)  perampanel (Fycompa, FCP)   phenobarbital (Pb)  phenytoin (Dilantin, PHT)  pregabalin (Lyrica, PGB)  primidone (Mysoline, PRM)  retigabine (Potiga, RTG)  rufinamide (Banzel, RUF)  tiagabine (Gabatril,  TGB)  topiramate (Topamax, TPM)  viagabatrin, (Sabril, VGB)  vagal nerve stimulator (VNS)  valproic acid (Depakote, VPA)  zonisamide (Zonegran, ZNS)  Benzodiazepines  diazepam - rectal (Diastatl)  diazepam - oral (Valium, DZ)  clonazepam (Klonopin, CZP)  clorazepate (Tranxene, CLZ)  Ativan  Brain Stimulation  Vagal Nerve Stimulation-n/a  DBS- n/a     Compliance method  Memory - yes  Mom or Spouse - Yes  Pill Box - no  Edward calendar - no  Turn over medication bottle - no  Phone alarm - no     Seizure Evaluation  EEG Routine -   EEG Ambulatory -   EEG\Video Monitoring -   MRI/MRA -   CT/CTA Scan -   PET Scan -   Neuropsychological evaluation -   DEXA Scan     Potential Epilepsy Risk Factors:   Pregnancy/Labor/Delivery - full term uncomplicated pregnancy labor and vaginal delivery  Febrile seizures - none  Head injury  - none  CNS infection - none     Stroke - none  Family Hx of Sz -  none     PAST MEDICAL HISTORY:           Active Ambulatory Problems     Diagnosis Date Noted    No Active Ambulatory Problems              Resolved Ambulatory Problems     Diagnosis Date Noted    No Resolved Ambulatory Problems      No Additional Past Medical History         PAST SURGICAL HISTORY: No past surgical history on file.      FAMILY HISTORY: No family history on file.       SOCIAL HISTORY:               Social History      Social History                Social History    Marital status: Single       Spouse name: N/A    Number of children: N/A    Years of education: N/A            Occupational History    Not on file.              Social History Main Topics    Smoking status: Current Every Day Smoker    Smokeless tobacco: Not on file    Alcohol use Not on file    Drug use: Not on file    Sexual activity: Not on file              Other Topics Concern    Not on file            Social History Narrative    No narrative on file            SUBSTANCE USE:  Social History              Social History Main Topics    Smoking status: Current Every Day Smoker    Smokeless tobacco: Not on file    Alcohol use Not on file    Drug use: Not on file    Sexual activity: Not on file              Social History   Substance Use Topics    Smoking status: Current Every Day Smoker    Smokeless tobacco: Not on file    Alcohol use Not on file         ALLERGIES: Review of patient's allergies indicates no known allergies.         Review of Systems   Constitutional: Negative for chills, diaphoresis, fever, malaise/fatigue and weight loss.   HENT: Negative for ear pain, hearing loss, nosebleeds and tinnitus.    Eyes: Negative for blurred vision, double vision, photophobia and pain.   Respiratory: Negative for cough, hemoptysis and shortness of breath.    Cardiovascular: Negative for chest pain, palpitations, orthopnea and leg swelling.   Gastrointestinal: Negative for abdominal pain, blood in stool, constipation,  "diarrhea, heartburn, nausea and vomiting.   Genitourinary: Negative for dysuria and hematuria.   Musculoskeletal: Negative for falls, joint pain and myalgias.   Skin: Negative for itching and rash.   Neurological: Positive for headaches. Negative for dizziness, tingling, tremors, sensory change, speech change, focal weakness, loss of consciousness and weakness.   Endo/Heme/Allergies: Negative for environmental allergies. Does not bruise/bleed easily.   Psychiatric/Behavioral: Negative for depression, hallucinations, memory loss and substance abuse. The patient is not nervous/anxious and does not have insomnia.             PHYSICAL EXAM:             Visit Vitals    /75    Pulse 85    Ht 6' 2" (1.88 m)    Wt 89.9 kg (198 lb 3.1 oz)    BMI 25.45 kg/m2            Neurologic Exam        Higher Cortical Function:    Patient is a well developed, pleasant, well groomed individual appearing their stated age  Oriented - intact to person, place and time    Fund of knowledge was appropriate.    Language - Speech was fluent without evidence for an aphasia.  R-L Orientation - Intact   Agnosias, agraphesthesia, or astereognosis - not present.   Cranial Nerves II - XII:    EOMs were intact with normal smooth and no nystagmus.    PERRLA. Funduscopic exam - disc were flat with normal A/V ratio and no exudates or hemorrhages.   Visual fields were full to confrontation.    Motor - facial movement was symmetrical and normal.    Facial sensory - Light touch and pin prick sensations were normal.    Hearing was normal.  Palate moved well and was symmetrical    Tongue movement was full & the patient could speak without difficulty.  Motor: Power, bulk and tone were normal in all extremities.  Coordination:  Normal  Gait:  Normal     Deep tendon reflexes:    Reflex L R   Bicpets 2+ 2+   Tricepts 2+ 2+   Brachio-radialis 2+ 2+   Knee 2+ 2+   Ankle 2+ 2+   Babinski No No      Tremor: resting, postural, intentional - " none  Cardiovascular:  No edema  Skin: No rash         I spent 60 minutes with the patient, of which 40 minutes was spent in direct face to face counseling in matters related to epilepsy, related safety issues, and antiepileptic drug therapy.           IMPRESSION  1.                   11 year h/o intractable epilepsy - partially controlled - worsened recently  2.                   S/P epilepsy surgery 3 yrs ago (Details unclear, has left cranial scar)  3.                   Incomplete database/poor historian  4.                   HA at site of craniotomy - improved         DISPOSITION:   1.                   LTG from 250mg BID to 300 BID 1 week ,then 400/300 for 1 week, then 400mg BID, then check levels  2.                   Maintain Keppra from 1500 BID   3.                   Seizure precautions.  4.                   State driving laws explained to patient again today and he voiced his understanding  5.                   Drug labs today

## 2020-01-22 ENCOUNTER — TELEPHONE (OUTPATIENT)
Dept: NEUROLOGY | Facility: CLINIC | Age: 40
End: 2020-01-22

## 2020-01-22 NOTE — TELEPHONE ENCOUNTER
Yesterday, the pa done via cover my  meds came back with response of: 'no pa needed. Plan covers lamotrigine'. But pharmacy stated it will not go through and they are unable to override for dose.    I sent a paper form for the pa to [facility]

## 2020-01-22 NOTE — TELEPHONE ENCOUNTER
Called Patient and he states he can't get Lamictal.Krystal Coronado RN worked on PA and they state that drug is covered.Will follow up.

## 2020-01-23 ENCOUNTER — TELEPHONE (OUTPATIENT)
Dept: NEUROLOGY | Facility: CLINIC | Age: 40
End: 2020-01-23

## 2020-01-24 NOTE — TELEPHONE ENCOUNTER
I contacted insurance company, who again said pa isn't needed. Pharmacy is to contact the 'pharmacy help desk' at [phone] if they need help for the 'dose override' code. I called the pharmacy and informed them again of this. I also called pt and gave him that same number for any further problems.     ----- Message from Daniella Rodrigues sent at 1/23/2020  4:43 PM CST -----  Contact: self @ [phone]  Pt may need a prior auth for lamoTRIgine (LAMICTAL) 200 MG tablet qty 240.  He says his prescription is at the pharmacy and they are telling him there is nothing on his part that needs to be done but they are working on betting him the prescription.  He was then told to call his Dr.  Pt says he needs the refill by tomorrow or he will start having seizures.   Pls call.

## 2020-04-30 ENCOUNTER — TELEPHONE (OUTPATIENT)
Dept: NEUROLOGY | Facility: CLINIC | Age: 40
End: 2020-04-30

## 2020-04-30 NOTE — TELEPHONE ENCOUNTER
Received message that Patient needs PA for Lamotrigine.PA done on cover my meds.Key is [id number].PA is not required.

## 2020-05-26 RX ORDER — LAMOTRIGINE 200 MG/1
250 TABLET ORAL 2 TIMES DAILY
Qty: 270 TABLET | Refills: 0 | Status: SHIPPED | OUTPATIENT
Start: 2020-05-26 | End: 2020-08-12 | Stop reason: SDUPTHER

## 2020-05-26 NOTE — TELEPHONE ENCOUNTER
----- Message from Erna Rios sent at 5/26/2020  8:15 AM CDT -----  Contact: Pt.[phone]  lamoTRIgine (LAMICTAL) 200 MG and levETIRAcetam (KEPPRA) 750 MG refill request       [id number] - [address] AT [facility] [place]  [address] [place] [address]  Phone: [phone] Fax: [phone]

## 2020-08-03 ENCOUNTER — TELEPHONE (OUTPATIENT)
Dept: NEUROLOGY | Facility: CLINIC | Age: 40
End: 2020-08-03
Payer: COMMERCIAL

## 2020-08-03 RX ORDER — LEVETIRACETAM 750 MG/1
750 TABLET ORAL 2 TIMES DAILY
Qty: 60 TABLET | Refills: 0 | Status: SHIPPED | OUTPATIENT
Start: 2020-08-03 | End: 2020-08-12 | Stop reason: SDUPTHER

## 2020-08-03 NOTE — TELEPHONE ENCOUNTER
----- Message from Yulissa James sent at 8/3/2020  9:24 AM CDT -----  Regarding: refill  Contact: pt @ [phone]  Rx Refill/Request     Is this a Refill or New Rx:  refill    Rx Name and Strength:  levETIRAcetam (KEPPRA) 750 MG Tab    Preferred Pharmacy with phone number:     [facility] #[id number] [address] [place] [address]  Phone: [phone] Fax: [phone]    Communication Preference:pt @ [phone]  Additional Information: patient says he is completely out of the medication. Please call.

## 2020-08-03 NOTE — TELEPHONE ENCOUNTER
----- Message from Rad Phillips sent at 8/3/2020  1:24 PM CDT -----  Contact: pt @ [phone]  Pt states he needs 120 tabs of levETIRAcetam (KEPPRA) 750 MG Tab, script sent in as 60 tabs    [facility] #[id number] - [place] [address] [place] [address] [address]  Phone: [phone] Fax: [phone]

## 2020-08-07 ENCOUNTER — OFFICE VISIT (OUTPATIENT)
Dept: NEUROLOGY | Facility: CLINIC | Age: 40
End: 2020-08-07
Payer: COMMERCIAL

## 2020-08-07 VITALS
HEIGHT: 74 IN | HEART RATE: 76 BPM | WEIGHT: 178 LBS | BODY MASS INDEX: 22.84 KG/M2 | DIASTOLIC BLOOD PRESSURE: 62 MMHG | SYSTOLIC BLOOD PRESSURE: 126 MMHG

## 2020-08-07 DIAGNOSIS — G40.219 PARTIAL SYMPTOMATIC EPILEPSY WITH COMPLEX PARTIAL SEIZURES, INTRACTABLE, WITHOUT STATUS EPILEPTICUS: Primary | ICD-10-CM

## 2020-08-07 DIAGNOSIS — F19.90 SUBSTANCE USE DISORDER: ICD-10-CM

## 2020-08-07 PROCEDURE — 99214 OFFICE O/P EST MOD 30 MIN: CPT | Mod: S$GLB,,, | Performed by: PSYCHIATRY & NEUROLOGY

## 2020-08-07 PROCEDURE — 99214 PR OFFICE/OUTPT VISIT, EST, LEVL IV, 30-39 MIN: ICD-10-PCS | Mod: S$GLB,,, | Performed by: PSYCHIATRY & NEUROLOGY

## 2020-08-07 PROCEDURE — 3008F BODY MASS INDEX DOCD: CPT | Mod: CPTII,S$GLB,, | Performed by: PSYCHIATRY & NEUROLOGY

## 2020-08-07 PROCEDURE — 3008F PR BODY MASS INDEX (BMI) DOCUMENTED: ICD-10-PCS | Mod: CPTII,S$GLB,, | Performed by: PSYCHIATRY & NEUROLOGY

## 2020-08-07 PROCEDURE — 99999 PR PBB SHADOW E&M-EST. PATIENT-LVL III: ICD-10-PCS | Mod: PBBFAC,,, | Performed by: PSYCHIATRY & NEUROLOGY

## 2020-08-07 PROCEDURE — 99999 PR PBB SHADOW E&M-EST. PATIENT-LVL III: CPT | Mod: PBBFAC,,, | Performed by: PSYCHIATRY & NEUROLOGY

## 2020-08-07 NOTE — ASSESSMENT & PLAN NOTE
41yo M with intractable seizures, s/p L (?) temporal lobectomy in 2015 (done at [place], [place])    Current AEDs:  - LTG 400mg bid  - LEV 1500mg bid     Plan:  - EMU evaluation to characterize and quantify seizures   - d/c LEV 1 day prior admission; can taper LTG in EMU  - may need to change AEDs in view of side-effects:   - anxiety and crying spells x few years ? related to LEV    - persisting skin rash on palms of hands (not new) ?related to LTG   - consider EsliCBZ, Clobazam as options    - potential candidate for 2nd epi surgery - patient is very reluctant to consider option at this time    Seizure log  Seizure precautions/restrictions    Plan of care was discussed in detail with patient.

## 2020-08-17 ENCOUNTER — TELEPHONE (OUTPATIENT)
Dept: NEUROLOGY | Facility: CLINIC | Age: 40
End: 2020-08-17

## 2020-08-17 NOTE — TELEPHONE ENCOUNTER
----- Message from Rad Phillips sent at 8/17/2020  2:43 PM CDT -----  Contact: pt @ [phone]  Pt states levETIRAcetam (KEPPRA) 750 MG Tab script is incorrect, he needs it written as 4 tabs daily. Pt states he will need the medication by tomorrow.    [id number] - [place] [place] AT [address] [place] [address]  Phone: [phone] Fax: [phone]

## 2020-08-18 RX ORDER — LEVETIRACETAM 750 MG/1
1500 TABLET ORAL 2 TIMES DAILY
Qty: 120 TABLET | Refills: 3 | Status: SHIPPED | OUTPATIENT
Start: 2020-08-18 | End: 2020-12-14

## 2020-08-20 ENCOUNTER — TELEPHONE (OUTPATIENT)
Dept: NEUROLOGY | Facility: CLINIC | Age: 40
End: 2020-08-20

## 2021-01-21 ENCOUNTER — TELEPHONE (OUTPATIENT)
Dept: NEUROLOGY | Facility: CLINIC | Age: 41
End: 2021-01-21

## 2021-01-22 ENCOUNTER — OFFICE VISIT (OUTPATIENT)
Dept: DERMATOLOGY | Facility: CLINIC | Age: 41
End: 2021-01-22
Payer: COMMERCIAL

## 2021-01-22 DIAGNOSIS — L98.9 DERMATOSIS: Primary | ICD-10-CM

## 2021-01-22 PROCEDURE — 99999 PR PBB SHADOW E&M-EST. PATIENT-LVL III: CPT | Mod: PBBFAC,,, | Performed by: PHYSICIAN ASSISTANT

## 2021-01-22 PROCEDURE — 99999 PR PBB SHADOW E&M-EST. PATIENT-LVL III: ICD-10-PCS | Mod: PBBFAC,,, | Performed by: PHYSICIAN ASSISTANT

## 2021-01-22 PROCEDURE — 99203 OFFICE O/P NEW LOW 30 MIN: CPT | Mod: S$GLB,,, | Performed by: PHYSICIAN ASSISTANT

## 2021-01-22 PROCEDURE — 1126F PR PAIN SEVERITY QUANTIFIED, NO PAIN PRESENT: ICD-10-PCS | Mod: S$GLB,,, | Performed by: PHYSICIAN ASSISTANT

## 2021-01-22 PROCEDURE — 1126F AMNT PAIN NOTED NONE PRSNT: CPT | Mod: S$GLB,,, | Performed by: PHYSICIAN ASSISTANT

## 2021-01-22 PROCEDURE — 99203 PR OFFICE/OUTPT VISIT, NEW, LEVL III, 30-44 MIN: ICD-10-PCS | Mod: S$GLB,,, | Performed by: PHYSICIAN ASSISTANT

## 2021-01-22 RX ORDER — CLOBETASOL PROPIONATE 0.5 MG/G
OINTMENT TOPICAL
Qty: 60 G | Refills: 3 | Status: SHIPPED | OUTPATIENT
Start: 2021-01-22

## 2021-02-05 RX ORDER — LAMOTRIGINE 200 MG/1
TABLET ORAL
Qty: 120 TABLET | Refills: 2 | Status: SHIPPED | OUTPATIENT
Start: 2021-02-05 | End: 2021-04-22

## 2021-02-05 RX ORDER — LAMOTRIGINE 200 MG/1
TABLET ORAL
Qty: 120 TABLET | Refills: 1 | Status: SHIPPED | OUTPATIENT
Start: 2021-02-05 | End: 2021-02-05 | Stop reason: SDUPTHER

## 2021-02-12 ENCOUNTER — TELEPHONE (OUTPATIENT)
Dept: DERMATOLOGY | Facility: CLINIC | Age: 41
End: 2021-02-12

## 2021-03-01 ENCOUNTER — OFFICE VISIT (OUTPATIENT)
Dept: DERMATOLOGY | Facility: CLINIC | Age: 41
End: 2021-03-01
Payer: COMMERCIAL

## 2021-03-01 DIAGNOSIS — L50.9 HIVES: Primary | ICD-10-CM

## 2021-03-01 DIAGNOSIS — L40.9 PSORIASIS: ICD-10-CM

## 2021-03-01 PROCEDURE — 99999 PR PBB SHADOW E&M-EST. PATIENT-LVL II: CPT | Mod: PBBFAC,,,

## 2021-03-01 PROCEDURE — 99214 OFFICE O/P EST MOD 30 MIN: CPT | Mod: S$GLB,,, | Performed by: DERMATOLOGY

## 2021-03-01 PROCEDURE — 99999 PR PBB SHADOW E&M-EST. PATIENT-LVL II: ICD-10-PCS | Mod: PBBFAC,,,

## 2021-03-01 PROCEDURE — 99214 PR OFFICE/OUTPT VISIT, EST, LEVL IV, 30-39 MIN: ICD-10-PCS | Mod: S$GLB,,, | Performed by: DERMATOLOGY

## 2021-03-01 PROCEDURE — 1126F PR PAIN SEVERITY QUANTIFIED, NO PAIN PRESENT: ICD-10-PCS | Mod: S$GLB,,, | Performed by: DERMATOLOGY

## 2021-03-01 PROCEDURE — 1126F AMNT PAIN NOTED NONE PRSNT: CPT | Mod: S$GLB,,, | Performed by: DERMATOLOGY

## 2021-03-01 RX ORDER — HYDROXYZINE HYDROCHLORIDE 25 MG/1
25 TABLET, FILM COATED ORAL 3 TIMES DAILY PRN
Qty: 90 TABLET | Refills: 2 | Status: SHIPPED | OUTPATIENT
Start: 2021-03-01 | End: 2021-04-23

## 2021-04-15 DIAGNOSIS — G40.219 PARTIAL SYMPTOMATIC EPILEPSY WITH COMPLEX PARTIAL SEIZURES, INTRACTABLE, WITHOUT STATUS EPILEPTICUS: Primary | ICD-10-CM

## 2021-04-15 RX ORDER — LEVETIRACETAM 750 MG/1
1500 TABLET ORAL 2 TIMES DAILY
Qty: 120 TABLET | Refills: 3 | Status: SHIPPED | OUTPATIENT
Start: 2021-04-15 | End: 2021-08-13 | Stop reason: SDUPTHER

## 2021-04-26 ENCOUNTER — PATIENT MESSAGE (OUTPATIENT)
Dept: RESEARCH | Facility: HOSPITAL | Age: 41
End: 2021-04-26

## 2021-06-21 ENCOUNTER — TELEPHONE (OUTPATIENT)
Dept: NEUROLOGY | Facility: CLINIC | Age: 41
End: 2021-06-21

## 2021-07-21 ENCOUNTER — TELEPHONE (OUTPATIENT)
Dept: NEUROLOGY | Facility: CLINIC | Age: 41
End: 2021-07-21

## 2021-07-22 ENCOUNTER — OFFICE VISIT (OUTPATIENT)
Dept: INTERNAL MEDICINE | Facility: CLINIC | Age: 41
End: 2021-07-22
Attending: FAMILY MEDICINE
Payer: COMMERCIAL

## 2021-07-22 VITALS
WEIGHT: 165.13 LBS | OXYGEN SATURATION: 98 % | SYSTOLIC BLOOD PRESSURE: 116 MMHG | DIASTOLIC BLOOD PRESSURE: 66 MMHG | HEIGHT: 74 IN | BODY MASS INDEX: 21.19 KG/M2 | HEART RATE: 87 BPM

## 2021-07-22 DIAGNOSIS — G40.219 PARTIAL SYMPTOMATIC EPILEPSY WITH COMPLEX PARTIAL SEIZURES, INTRACTABLE, WITHOUT STATUS EPILEPTICUS: ICD-10-CM

## 2021-07-22 DIAGNOSIS — M25.572 LEFT ANKLE PAIN, UNSPECIFIED CHRONICITY: Primary | ICD-10-CM

## 2021-07-22 DIAGNOSIS — M25.572 ACUTE LEFT ANKLE PAIN: Primary | ICD-10-CM

## 2021-07-22 PROCEDURE — 99214 PR OFFICE/OUTPT VISIT, EST, LEVL IV, 30-39 MIN: ICD-10-PCS | Mod: S$GLB,ICN,, | Performed by: FAMILY MEDICINE

## 2021-07-22 PROCEDURE — 99999 PR PBB SHADOW E&M-EST. PATIENT-LVL III: CPT | Mod: PBBFAC,,, | Performed by: FAMILY MEDICINE

## 2021-07-22 PROCEDURE — 3008F PR BODY MASS INDEX (BMI) DOCUMENTED: ICD-10-PCS | Mod: CPTII,S$GLB,ICN, | Performed by: FAMILY MEDICINE

## 2021-07-22 PROCEDURE — 99214 OFFICE O/P EST MOD 30 MIN: CPT | Mod: S$GLB,ICN,, | Performed by: FAMILY MEDICINE

## 2021-07-22 PROCEDURE — 1125F AMNT PAIN NOTED PAIN PRSNT: CPT | Mod: CPTII,S$GLB,ICN, | Performed by: FAMILY MEDICINE

## 2021-07-22 PROCEDURE — 99999 PR PBB SHADOW E&M-EST. PATIENT-LVL III: ICD-10-PCS | Mod: PBBFAC,,, | Performed by: FAMILY MEDICINE

## 2021-07-22 PROCEDURE — 3008F BODY MASS INDEX DOCD: CPT | Mod: CPTII,S$GLB,ICN, | Performed by: FAMILY MEDICINE

## 2021-07-22 PROCEDURE — 1125F PR PAIN SEVERITY QUANTIFIED, PAIN PRESENT: ICD-10-PCS | Mod: CPTII,S$GLB,ICN, | Performed by: FAMILY MEDICINE

## 2021-07-23 ENCOUNTER — OFFICE VISIT (OUTPATIENT)
Dept: ORTHOPEDICS | Facility: CLINIC | Age: 41
End: 2021-07-23
Payer: COMMERCIAL

## 2021-07-23 ENCOUNTER — HOSPITAL ENCOUNTER (OUTPATIENT)
Dept: RADIOLOGY | Facility: HOSPITAL | Age: 41
Discharge: HOME | End: 2021-07-23
Attending: PHYSICIAN ASSISTANT
Payer: COMMERCIAL

## 2021-07-23 ENCOUNTER — HOSPITAL ENCOUNTER (OUTPATIENT)
Dept: RADIOLOGY | Facility: OTHER | Age: 41
Discharge: HOME | End: 2021-07-23
Attending: PHYSICIAN ASSISTANT
Payer: COMMERCIAL

## 2021-07-23 VITALS
HEIGHT: 74 IN | HEART RATE: 92 BPM | BODY MASS INDEX: 21.47 KG/M2 | SYSTOLIC BLOOD PRESSURE: 124 MMHG | DIASTOLIC BLOOD PRESSURE: 75 MMHG | WEIGHT: 167.31 LBS

## 2021-07-23 DIAGNOSIS — S86.012A ACHILLES RUPTURE, LEFT, INITIAL ENCOUNTER: Primary | ICD-10-CM

## 2021-07-23 DIAGNOSIS — M25.572 LEFT ANKLE PAIN, UNSPECIFIED CHRONICITY: ICD-10-CM

## 2021-07-23 DIAGNOSIS — M25.572 ACUTE LEFT ANKLE PAIN: ICD-10-CM

## 2021-07-23 DIAGNOSIS — S86.012A ACHILLES RUPTURE, LEFT, INITIAL ENCOUNTER: ICD-10-CM

## 2021-07-23 PROCEDURE — 73610 X-RAY EXAM OF ANKLE: CPT | Mod: 26,LT,, | Performed by: RADIOLOGY

## 2021-07-23 PROCEDURE — 99213 PR OFFICE/OUTPT VISIT, EST, LEVL III, 20-29 MIN: ICD-10-PCS | Mod: S$GLB,,, | Performed by: PHYSICIAN ASSISTANT

## 2021-07-23 PROCEDURE — 1125F AMNT PAIN NOTED PAIN PRSNT: CPT | Mod: CPTII,S$GLB,, | Performed by: PHYSICIAN ASSISTANT

## 2021-07-23 PROCEDURE — 3008F PR BODY MASS INDEX (BMI) DOCUMENTED: ICD-10-PCS | Mod: CPTII,S$GLB,, | Performed by: PHYSICIAN ASSISTANT

## 2021-07-23 PROCEDURE — 3008F BODY MASS INDEX DOCD: CPT | Mod: CPTII,S$GLB,, | Performed by: PHYSICIAN ASSISTANT

## 2021-07-23 PROCEDURE — 73721 MRI JNT OF LWR EXTRE W/O DYE: CPT | Mod: 26,LT,, | Performed by: RADIOLOGY

## 2021-07-23 PROCEDURE — 99999 PR PBB SHADOW E&M-EST. PATIENT-LVL IV: CPT | Mod: PBBFAC,,, | Performed by: PHYSICIAN ASSISTANT

## 2021-07-23 PROCEDURE — 99213 OFFICE O/P EST LOW 20 MIN: CPT | Mod: S$GLB,,, | Performed by: PHYSICIAN ASSISTANT

## 2021-07-23 PROCEDURE — 99999 PR PBB SHADOW E&M-EST. PATIENT-LVL IV: ICD-10-PCS | Mod: PBBFAC,,, | Performed by: PHYSICIAN ASSISTANT

## 2021-07-23 PROCEDURE — 73721 MRI ANKLE WITHOUT CONTRAST LEFT: ICD-10-PCS | Mod: 26,LT,, | Performed by: RADIOLOGY

## 2021-07-23 PROCEDURE — 73610 XR ANKLE COMPLETE 3 VIEW LEFT: ICD-10-PCS | Mod: 26,LT,, | Performed by: RADIOLOGY

## 2021-07-23 PROCEDURE — 73610 X-RAY EXAM OF ANKLE: CPT | Mod: TC,FY,LT

## 2021-07-23 PROCEDURE — 1125F PR PAIN SEVERITY QUANTIFIED, PAIN PRESENT: ICD-10-PCS | Mod: CPTII,S$GLB,, | Performed by: PHYSICIAN ASSISTANT

## 2021-07-23 PROCEDURE — 73721 MRI JNT OF LWR EXTRE W/O DYE: CPT | Mod: TC,LT

## 2021-07-26 ENCOUNTER — OFFICE VISIT (OUTPATIENT)
Dept: ORTHOPEDICS | Facility: CLINIC | Age: 41
End: 2021-07-26
Payer: COMMERCIAL

## 2021-07-26 VITALS
WEIGHT: 167 LBS | HEART RATE: 75 BPM | BODY MASS INDEX: 21.43 KG/M2 | SYSTOLIC BLOOD PRESSURE: 121 MMHG | DIASTOLIC BLOOD PRESSURE: 67 MMHG | HEIGHT: 74 IN

## 2021-07-26 DIAGNOSIS — S86.012A PARTIAL TEAR OF ACHILLES TENDON, LEFT, INITIAL ENCOUNTER: Primary | ICD-10-CM

## 2021-07-26 PROCEDURE — 3008F PR BODY MASS INDEX (BMI) DOCUMENTED: ICD-10-PCS | Mod: CPTII,S$GLB,, | Performed by: ORTHOPAEDIC SURGERY

## 2021-07-26 PROCEDURE — 1125F PR PAIN SEVERITY QUANTIFIED, PAIN PRESENT: ICD-10-PCS | Mod: CPTII,S$GLB,, | Performed by: ORTHOPAEDIC SURGERY

## 2021-07-26 PROCEDURE — 99999 PR PBB SHADOW E&M-EST. PATIENT-LVL IV: CPT | Mod: PBBFAC,,, | Performed by: ORTHOPAEDIC SURGERY

## 2021-07-26 PROCEDURE — 99214 PR OFFICE/OUTPT VISIT, EST, LEVL IV, 30-39 MIN: ICD-10-PCS | Mod: S$GLB,,, | Performed by: ORTHOPAEDIC SURGERY

## 2021-07-26 PROCEDURE — 3008F BODY MASS INDEX DOCD: CPT | Mod: CPTII,S$GLB,, | Performed by: ORTHOPAEDIC SURGERY

## 2021-07-26 PROCEDURE — 1125F AMNT PAIN NOTED PAIN PRSNT: CPT | Mod: CPTII,S$GLB,, | Performed by: ORTHOPAEDIC SURGERY

## 2021-07-26 PROCEDURE — 99214 OFFICE O/P EST MOD 30 MIN: CPT | Mod: S$GLB,,, | Performed by: ORTHOPAEDIC SURGERY

## 2021-07-26 PROCEDURE — 1159F PR MEDICATION LIST DOCUMENTED IN MEDICAL RECORD: ICD-10-PCS | Mod: CPTII,S$GLB,, | Performed by: ORTHOPAEDIC SURGERY

## 2021-07-26 PROCEDURE — 1159F MED LIST DOCD IN RCRD: CPT | Mod: CPTII,S$GLB,, | Performed by: ORTHOPAEDIC SURGERY

## 2021-07-26 PROCEDURE — 99999 PR PBB SHADOW E&M-EST. PATIENT-LVL IV: ICD-10-PCS | Mod: PBBFAC,,, | Performed by: ORTHOPAEDIC SURGERY

## 2021-07-26 RX ORDER — TRAMADOL HYDROCHLORIDE 50 MG/1
50 TABLET ORAL EVERY 8 HOURS PRN
Qty: 21 TABLET | Refills: 0 | Status: SHIPPED | OUTPATIENT
Start: 2021-07-26 | End: 2021-08-02

## 2021-07-28 ENCOUNTER — OFFICE VISIT (OUTPATIENT)
Dept: NEUROLOGY | Facility: CLINIC | Age: 41
End: 2021-07-28
Payer: COMMERCIAL

## 2021-07-28 VITALS
DIASTOLIC BLOOD PRESSURE: 74 MMHG | BODY MASS INDEX: 21.45 KG/M2 | WEIGHT: 167.13 LBS | SYSTOLIC BLOOD PRESSURE: 133 MMHG | HEIGHT: 74 IN | HEART RATE: 93 BPM

## 2021-07-28 DIAGNOSIS — G40.219 PARTIAL SYMPTOMATIC EPILEPSY WITH COMPLEX PARTIAL SEIZURES, INTRACTABLE, WITHOUT STATUS EPILEPTICUS: ICD-10-CM

## 2021-07-28 PROCEDURE — 1159F PR MEDICATION LIST DOCUMENTED IN MEDICAL RECORD: ICD-10-PCS | Mod: CPTII,S$GLB,, | Performed by: PSYCHIATRY & NEUROLOGY

## 2021-07-28 PROCEDURE — 1160F RVW MEDS BY RX/DR IN RCRD: CPT | Mod: CPTII,S$GLB,, | Performed by: PSYCHIATRY & NEUROLOGY

## 2021-07-28 PROCEDURE — 1160F PR REVIEW ALL MEDS BY PRESCRIBER/CLIN PHARMACIST DOCUMENTED: ICD-10-PCS | Mod: CPTII,S$GLB,, | Performed by: PSYCHIATRY & NEUROLOGY

## 2021-07-28 PROCEDURE — 99999 PR PBB SHADOW E&M-EST. PATIENT-LVL III: ICD-10-PCS | Mod: PBBFAC,,, | Performed by: PSYCHIATRY & NEUROLOGY

## 2021-07-28 PROCEDURE — 99999 PR PBB SHADOW E&M-EST. PATIENT-LVL III: CPT | Mod: PBBFAC,,, | Performed by: PSYCHIATRY & NEUROLOGY

## 2021-07-28 PROCEDURE — 99215 PR OFFICE/OUTPT VISIT, EST, LEVL V, 40-54 MIN: ICD-10-PCS | Mod: S$GLB,,, | Performed by: PSYCHIATRY & NEUROLOGY

## 2021-07-28 PROCEDURE — 1126F AMNT PAIN NOTED NONE PRSNT: CPT | Mod: CPTII,S$GLB,, | Performed by: PSYCHIATRY & NEUROLOGY

## 2021-07-28 PROCEDURE — 1159F MED LIST DOCD IN RCRD: CPT | Mod: CPTII,S$GLB,, | Performed by: PSYCHIATRY & NEUROLOGY

## 2021-07-28 PROCEDURE — 99215 OFFICE O/P EST HI 40 MIN: CPT | Mod: S$GLB,,, | Performed by: PSYCHIATRY & NEUROLOGY

## 2021-07-28 PROCEDURE — 1126F PR PAIN SEVERITY QUANTIFIED, NO PAIN PRESENT: ICD-10-PCS | Mod: CPTII,S$GLB,, | Performed by: PSYCHIATRY & NEUROLOGY

## 2021-07-28 PROCEDURE — 3008F PR BODY MASS INDEX (BMI) DOCUMENTED: ICD-10-PCS | Mod: CPTII,S$GLB,, | Performed by: PSYCHIATRY & NEUROLOGY

## 2021-07-28 PROCEDURE — 3008F BODY MASS INDEX DOCD: CPT | Mod: CPTII,S$GLB,, | Performed by: PSYCHIATRY & NEUROLOGY

## 2021-07-28 RX ORDER — LAMOTRIGINE 200 MG/1
TABLET ORAL
Qty: 120 TABLET | Refills: 2 | Status: SHIPPED | OUTPATIENT
Start: 2021-07-28 | End: 2021-10-26

## 2021-08-04 ENCOUNTER — TELEPHONE (OUTPATIENT)
Dept: ORTHOPEDICS | Facility: CLINIC | Age: 41
End: 2021-08-04

## 2021-08-05 ENCOUNTER — TELEPHONE (OUTPATIENT)
Dept: ORTHOPEDICS | Facility: CLINIC | Age: 41
End: 2021-08-05

## 2021-08-13 DIAGNOSIS — G40.219 PARTIAL SYMPTOMATIC EPILEPSY WITH COMPLEX PARTIAL SEIZURES, INTRACTABLE, WITHOUT STATUS EPILEPTICUS: ICD-10-CM

## 2021-08-13 RX ORDER — LEVETIRACETAM 750 MG/1
1500 TABLET ORAL 2 TIMES DAILY
Qty: 120 TABLET | Refills: 3 | Status: SHIPPED | OUTPATIENT
Start: 2021-08-13 | End: 2021-11-05

## 2021-10-08 ENCOUNTER — OFFICE VISIT (OUTPATIENT)
Dept: URGENT CARE | Facility: CLINIC | Age: 41
End: 2021-10-08
Payer: COMMERCIAL

## 2021-10-08 VITALS
TEMPERATURE: 98 F | WEIGHT: 160 LBS | RESPIRATION RATE: 20 BRPM | BODY MASS INDEX: 20.53 KG/M2 | HEIGHT: 74 IN | HEART RATE: 80 BPM | SYSTOLIC BLOOD PRESSURE: 136 MMHG | DIASTOLIC BLOOD PRESSURE: 68 MMHG | OXYGEN SATURATION: 98 %

## 2021-10-08 DIAGNOSIS — S81.802A OPEN WOUND OF LEFT LOWER LEG, INITIAL ENCOUNTER: ICD-10-CM

## 2021-10-08 DIAGNOSIS — Z23 NEED FOR TDAP VACCINATION: ICD-10-CM

## 2021-10-08 DIAGNOSIS — L03.116 CELLULITIS OF LEFT LOWER LEG: Primary | ICD-10-CM

## 2021-10-08 DIAGNOSIS — Z71.6 TOBACCO ABUSE COUNSELING: ICD-10-CM

## 2021-10-08 PROCEDURE — 3008F PR BODY MASS INDEX (BMI) DOCUMENTED: ICD-10-PCS | Mod: CPTII,S$GLB,, | Performed by: PHYSICIAN ASSISTANT

## 2021-10-08 PROCEDURE — 90471 IMMUNIZATION ADMIN: CPT | Mod: 59,S$GLB,, | Performed by: PHYSICIAN ASSISTANT

## 2021-10-08 PROCEDURE — 3008F BODY MASS INDEX DOCD: CPT | Mod: CPTII,S$GLB,, | Performed by: PHYSICIAN ASSISTANT

## 2021-10-08 PROCEDURE — 3075F PR MOST RECENT SYSTOLIC BLOOD PRESS GE 130-139MM HG: ICD-10-PCS | Mod: CPTII,S$GLB,, | Performed by: PHYSICIAN ASSISTANT

## 2021-10-08 PROCEDURE — 3078F PR MOST RECENT DIASTOLIC BLOOD PRESSURE < 80 MM HG: ICD-10-PCS | Mod: CPTII,S$GLB,, | Performed by: PHYSICIAN ASSISTANT

## 2021-10-08 PROCEDURE — 99213 PR OFFICE/OUTPT VISIT, EST, LEVL III, 20-29 MIN: ICD-10-PCS | Mod: 25,S$GLB,, | Performed by: PHYSICIAN ASSISTANT

## 2021-10-08 PROCEDURE — 90715 TDAP VACCINE 7 YRS/> IM: CPT | Mod: S$GLB,,, | Performed by: PHYSICIAN ASSISTANT

## 2021-10-08 PROCEDURE — 90471 TDAP VACCINE GREATER THAN OR EQUAL TO 7YO IM: ICD-10-PCS | Mod: 59,S$GLB,, | Performed by: PHYSICIAN ASSISTANT

## 2021-10-08 PROCEDURE — 3075F SYST BP GE 130 - 139MM HG: CPT | Mod: CPTII,S$GLB,, | Performed by: PHYSICIAN ASSISTANT

## 2021-10-08 PROCEDURE — 99213 OFFICE O/P EST LOW 20 MIN: CPT | Mod: 25,S$GLB,, | Performed by: PHYSICIAN ASSISTANT

## 2021-10-08 PROCEDURE — 3078F DIAST BP <80 MM HG: CPT | Mod: CPTII,S$GLB,, | Performed by: PHYSICIAN ASSISTANT

## 2021-10-08 PROCEDURE — 96372 PR INJECTION,THERAP/PROPH/DIAG2ST, IM OR SUBCUT: ICD-10-PCS | Mod: S$GLB,,, | Performed by: PHYSICIAN ASSISTANT

## 2021-10-08 PROCEDURE — 96372 THER/PROPH/DIAG INJ SC/IM: CPT | Mod: S$GLB,,, | Performed by: PHYSICIAN ASSISTANT

## 2021-10-08 PROCEDURE — 90715 TDAP VACCINE GREATER THAN OR EQUAL TO 7YO IM: ICD-10-PCS | Mod: S$GLB,,, | Performed by: PHYSICIAN ASSISTANT

## 2021-10-08 RX ORDER — CEFTRIAXONE 1 G/1
1 INJECTION, POWDER, FOR SOLUTION INTRAMUSCULAR; INTRAVENOUS
Status: COMPLETED | OUTPATIENT
Start: 2021-10-08 | End: 2021-10-08

## 2021-10-08 RX ORDER — DOXYCYCLINE 100 MG/1
100 CAPSULE ORAL 2 TIMES DAILY
Qty: 28 CAPSULE | Refills: 0 | Status: SHIPPED | OUTPATIENT
Start: 2021-10-08 | End: 2021-10-22

## 2021-10-08 RX ORDER — MUPIROCIN 20 MG/G
OINTMENT TOPICAL 3 TIMES DAILY
Qty: 1 TUBE | Refills: 1 | Status: SHIPPED | OUTPATIENT
Start: 2021-10-08 | End: 2021-10-15

## 2021-10-08 RX ADMIN — CEFTRIAXONE 1 G: 1 INJECTION, POWDER, FOR SOLUTION INTRAMUSCULAR; INTRAVENOUS at 03:10

## 2021-10-20 ENCOUNTER — OFFICE VISIT (OUTPATIENT)
Dept: WOUND CARE | Facility: CLINIC | Age: 41
End: 2021-10-20
Payer: COMMERCIAL

## 2021-10-20 VITALS
SYSTOLIC BLOOD PRESSURE: 136 MMHG | TEMPERATURE: 98 F | HEIGHT: 74 IN | WEIGHT: 163.38 LBS | HEART RATE: 113 BPM | BODY MASS INDEX: 20.97 KG/M2 | DIASTOLIC BLOOD PRESSURE: 90 MMHG

## 2021-10-20 DIAGNOSIS — I87.2 VENOUS INSUFFICIENCY OF BOTH LOWER EXTREMITIES: ICD-10-CM

## 2021-10-20 DIAGNOSIS — L97.922 ULCER OF LEFT LOWER EXTREMITY WITH FAT LAYER EXPOSED: ICD-10-CM

## 2021-10-20 PROCEDURE — 3075F SYST BP GE 130 - 139MM HG: CPT | Mod: CPTII,S$GLB,, | Performed by: NURSE PRACTITIONER

## 2021-10-20 PROCEDURE — 1159F PR MEDICATION LIST DOCUMENTED IN MEDICAL RECORD: ICD-10-PCS | Mod: CPTII,S$GLB,, | Performed by: NURSE PRACTITIONER

## 2021-10-20 PROCEDURE — 1160F PR REVIEW ALL MEDS BY PRESCRIBER/CLIN PHARMACIST DOCUMENTED: ICD-10-PCS | Mod: CPTII,S$GLB,, | Performed by: NURSE PRACTITIONER

## 2021-10-20 PROCEDURE — 1160F RVW MEDS BY RX/DR IN RCRD: CPT | Mod: CPTII,S$GLB,, | Performed by: NURSE PRACTITIONER

## 2021-10-20 PROCEDURE — 3080F DIAST BP >= 90 MM HG: CPT | Mod: CPTII,S$GLB,, | Performed by: NURSE PRACTITIONER

## 2021-10-20 PROCEDURE — 3075F PR MOST RECENT SYSTOLIC BLOOD PRESS GE 130-139MM HG: ICD-10-PCS | Mod: CPTII,S$GLB,, | Performed by: NURSE PRACTITIONER

## 2021-10-20 PROCEDURE — 99999 PR PBB SHADOW E&M-EST. PATIENT-LVL IV: ICD-10-PCS | Mod: PBBFAC,,, | Performed by: NURSE PRACTITIONER

## 2021-10-20 PROCEDURE — 1159F MED LIST DOCD IN RCRD: CPT | Mod: CPTII,S$GLB,, | Performed by: NURSE PRACTITIONER

## 2021-10-20 PROCEDURE — 3080F PR MOST RECENT DIASTOLIC BLOOD PRESSURE >= 90 MM HG: ICD-10-PCS | Mod: CPTII,S$GLB,, | Performed by: NURSE PRACTITIONER

## 2021-10-20 PROCEDURE — 3008F BODY MASS INDEX DOCD: CPT | Mod: CPTII,S$GLB,, | Performed by: NURSE PRACTITIONER

## 2021-10-20 PROCEDURE — 99204 PR OFFICE/OUTPT VISIT, NEW, LEVL IV, 45-59 MIN: ICD-10-PCS | Mod: S$GLB,,, | Performed by: NURSE PRACTITIONER

## 2021-10-20 PROCEDURE — 99999 PR PBB SHADOW E&M-EST. PATIENT-LVL IV: CPT | Mod: PBBFAC,,, | Performed by: NURSE PRACTITIONER

## 2021-10-20 PROCEDURE — 3008F PR BODY MASS INDEX (BMI) DOCUMENTED: ICD-10-PCS | Mod: CPTII,S$GLB,, | Performed by: NURSE PRACTITIONER

## 2021-10-20 PROCEDURE — 99204 OFFICE O/P NEW MOD 45 MIN: CPT | Mod: S$GLB,,, | Performed by: NURSE PRACTITIONER

## 2021-10-20 RX ORDER — JNJ-78436735 50000000000 [PFU]/.5ML
SUSPENSION INTRAMUSCULAR
Status: ON HOLD | COMMUNITY
Start: 2021-06-07 | End: 2023-09-05

## 2021-10-23 ENCOUNTER — TELEPHONE (OUTPATIENT)
Dept: URGENT CARE | Facility: CLINIC | Age: 41
End: 2021-10-23
Payer: COMMERCIAL

## 2022-03-02 NOTE — TELEPHONE ENCOUNTER
Left message and call back number message states the scheduled appt is the soonest for Dr. Rizo        Message from Guadalupe Cardona sent at 7/11/2017  7:51 AM CDT -----  Contact: Pt's prince Ellison  Pt's wife, Gisell, called for a sooner appointment.  Pt is not feeling well.      Ms. Jameson can be reached at [phone].        Thank you      Subjective:       Patient ID: Cuong Aguayo is a 25 y.o. female.    Chief Complaint: Annual Exam    HPI   25-year-old female comes in for annual exam. She works as a pharmacist. She reports that she continues to have anxiety and depression however it is mild. She is willing to see a therapist.    She also reports a history of exercise induced asthma and a flexural eczema.    Review of Systems   Constitutional: Negative for activity change and unexpected weight change.   HENT: Negative for hearing loss, rhinorrhea and trouble swallowing.    Eyes: Negative for discharge and visual disturbance.   Respiratory: Negative for chest tightness and wheezing.    Cardiovascular: Negative for chest pain and palpitations.   Gastrointestinal: Negative for blood in stool, constipation, diarrhea and vomiting.   Endocrine: Negative for polydipsia and polyuria.   Genitourinary: Positive for menstrual problem. Negative for difficulty urinating, dysuria and hematuria.   Musculoskeletal: Negative for arthralgias, joint swelling and neck pain.   Neurological: Negative for weakness and headaches.   Psychiatric/Behavioral: Positive for dysphoric mood. Negative for confusion.         Objective:      Physical Exam  Vitals reviewed.   Constitutional:       General: She is not in acute distress.  HENT:      Head: Normocephalic and atraumatic.      Right Ear: Ear canal and external ear normal.      Left Ear: Ear canal and external ear normal.      Nose: Nose normal.      Mouth/Throat:      Mouth: Mucous membranes are moist.      Pharynx: No oropharyngeal exudate or posterior oropharyngeal erythema.   Eyes:      Extraocular Movements: Extraocular movements intact.      Conjunctiva/sclera: Conjunctivae normal.      Pupils: Pupils are equal, round, and reactive to light.   Cardiovascular:      Rate and Rhythm: Normal rate and regular rhythm.      Pulses: Normal pulses.      Heart sounds: Normal heart sounds.   Pulmonary:      Effort: Pulmonary  effort is normal. No respiratory distress.      Breath sounds: No wheezing or rales.   Abdominal:      General: Abdomen is flat. Bowel sounds are normal. There is no distension.      Palpations: Abdomen is soft.      Tenderness: There is no abdominal tenderness. There is no guarding.   Musculoskeletal:      Cervical back: Normal range of motion. No rigidity or tenderness.   Lymphadenopathy:      Cervical: No cervical adenopathy.   Skin:     General: Skin is warm.      Capillary Refill: Capillary refill takes less than 2 seconds.   Neurological:      Mental Status: She is alert and oriented to person, place, and time.      Cranial Nerves: No cranial nerve deficit.      Sensory: No sensory deficit.   Psychiatric:         Mood and Affect: Mood normal.         Behavior: Behavior normal.         Assessment:       Problem List Items Addressed This Visit    None     Visit Diagnoses     Physical exam    -  Primary    Relevant Orders    Comprehensive Metabolic Panel (Completed)    CBC Auto Differential (Completed)    Exercise-induced asthma        Relevant Medications    albuterol (PROAIR HFA) 90 mcg/actuation inhaler    Flexural eczema        Mild depression        Relevant Orders    Ambulatory referral/consult to Psychology    Anxiety        Relevant Orders    Ambulatory referral/consult to Psychology    Encounter for screening for HIV        Relevant Orders    HIV 1/2 Ag/Ab (4th Gen) (Completed)    Need for hepatitis C screening test        Relevant Orders    Hepatitis C Antibody (Completed)    Encounter for lipid screening for cardiovascular disease        Relevant Orders    Lipid Panel (Completed)          Plan:       Cuong was seen today for annual exam.    Diagnoses and all orders for this visit:    Physical exam  -     Comprehensive Metabolic Panel; Future  -     CBC Auto Differential; Future  Age appropriate recommendations reviewed.  Screening labs ordered.    Exercise-induced asthma  -     albuterol (PROAIR HFA)  90 mcg/actuation inhaler; Inhale 2 puffs into the lungs every 4 (four) hours as needed for Shortness of Breath. Rescue  Advised on using inhaler 20 minutes prior to exercise    Flexural eczema  -     triamcinolone acetonide 0.1% (KENALOG) 0.1 % ointment; Apply topically 2 (two) times daily. To affected areas for 5 days  Topical Kenalog refilled    Mild depression/Anxiety  -     Ambulatory referral/consult to Psychology; Future  Referral to psychology placed    Encounter for screening for HIV  -     HIV 1/2 Ag/Ab (4th Gen); Future  Screen for HIV as per USPSTF guidelines    Need for hepatitis C screening test  -     Hepatitis C Antibody; Future  Screen for HCV as per USPSTF guidelines    Encounter for lipid screening for cardiovascular disease  -     Lipid Panel; Future  Screening lipid panel ordered.

## 2022-06-22 ENCOUNTER — TELEPHONE (OUTPATIENT)
Dept: NEUROLOGY | Facility: CLINIC | Age: 42
End: 2022-06-22
Payer: COMMERCIAL

## 2022-06-22 DIAGNOSIS — G40.219 PARTIAL SYMPTOMATIC EPILEPSY WITH COMPLEX PARTIAL SEIZURES, INTRACTABLE, WITHOUT STATUS EPILEPTICUS: ICD-10-CM

## 2022-06-22 RX ORDER — LEVETIRACETAM 750 MG/1
1500 TABLET ORAL 2 TIMES DAILY
Qty: 360 TABLET | Refills: 0 | Status: SHIPPED | OUTPATIENT
Start: 2022-06-22 | End: 2022-10-26 | Stop reason: SDUPTHER

## 2022-06-22 NOTE — TELEPHONE ENCOUNTER
----- Message from Claudia Johnson MA sent at 6/22/2022 10:17 AM CDT -----  Contact: [phone]  Patient request refills on meds     levETIRAcetam (KEPPRA) 750 MG Tab    lamoTRIgine (LAMICTAL) 200 MG tablet      [id number] - [place] - [address] [place] [address]   Phone:  [phone]  Fax:  [phone]

## 2022-08-11 ENCOUNTER — OFFICE VISIT (OUTPATIENT)
Dept: URGENT CARE | Facility: CLINIC | Age: 42
End: 2022-08-11
Payer: COMMERCIAL

## 2022-08-11 VITALS
RESPIRATION RATE: 16 BRPM | BODY MASS INDEX: 20.92 KG/M2 | DIASTOLIC BLOOD PRESSURE: 62 MMHG | HEART RATE: 81 BPM | TEMPERATURE: 98 F | SYSTOLIC BLOOD PRESSURE: 125 MMHG | WEIGHT: 163 LBS | OXYGEN SATURATION: 98 % | HEIGHT: 74 IN

## 2022-08-11 DIAGNOSIS — I87.2 VENOUS INSUFFICIENCY OF LEFT LOWER EXTREMITY: ICD-10-CM

## 2022-08-11 DIAGNOSIS — L97.922 ULCER OF LEFT LOWER EXTREMITY WITH FAT LAYER EXPOSED: Primary | ICD-10-CM

## 2022-08-11 LAB — GLUCOSE SERPL-MCNC: 69 MG/DL (ref 70–110)

## 2022-08-11 PROCEDURE — 1159F MED LIST DOCD IN RCRD: CPT | Mod: CPTII,S$GLB,, | Performed by: INTERNAL MEDICINE

## 2022-08-11 PROCEDURE — 82962 POCT GLUCOSE, HAND-HELD DEVICE: ICD-10-PCS | Mod: S$GLB,,, | Performed by: INTERNAL MEDICINE

## 2022-08-11 PROCEDURE — 3008F BODY MASS INDEX DOCD: CPT | Mod: CPTII,S$GLB,, | Performed by: INTERNAL MEDICINE

## 2022-08-11 PROCEDURE — 3074F SYST BP LT 130 MM HG: CPT | Mod: CPTII,S$GLB,, | Performed by: INTERNAL MEDICINE

## 2022-08-11 PROCEDURE — 82962 GLUCOSE BLOOD TEST: CPT | Mod: S$GLB,,, | Performed by: INTERNAL MEDICINE

## 2022-08-11 PROCEDURE — 3078F PR MOST RECENT DIASTOLIC BLOOD PRESSURE < 80 MM HG: ICD-10-PCS | Mod: CPTII,S$GLB,, | Performed by: INTERNAL MEDICINE

## 2022-08-11 PROCEDURE — 1159F PR MEDICATION LIST DOCUMENTED IN MEDICAL RECORD: ICD-10-PCS | Mod: CPTII,S$GLB,, | Performed by: INTERNAL MEDICINE

## 2022-08-11 PROCEDURE — 99215 OFFICE O/P EST HI 40 MIN: CPT | Mod: S$GLB,,, | Performed by: INTERNAL MEDICINE

## 2022-08-11 PROCEDURE — 99215 PR OFFICE/OUTPT VISIT, EST, LEVL V, 40-54 MIN: ICD-10-PCS | Mod: S$GLB,,, | Performed by: INTERNAL MEDICINE

## 2022-08-11 PROCEDURE — 73610 X-RAY EXAM OF ANKLE: CPT | Mod: LT,S$GLB,, | Performed by: RADIOLOGY

## 2022-08-11 PROCEDURE — 3078F DIAST BP <80 MM HG: CPT | Mod: CPTII,S$GLB,, | Performed by: INTERNAL MEDICINE

## 2022-08-11 PROCEDURE — 73610 XR ANKLE COMPLETE 3 VIEW LEFT: ICD-10-PCS | Mod: LT,S$GLB,, | Performed by: RADIOLOGY

## 2022-08-11 PROCEDURE — 3008F PR BODY MASS INDEX (BMI) DOCUMENTED: ICD-10-PCS | Mod: CPTII,S$GLB,, | Performed by: INTERNAL MEDICINE

## 2022-08-11 PROCEDURE — 3074F PR MOST RECENT SYSTOLIC BLOOD PRESSURE < 130 MM HG: ICD-10-PCS | Mod: CPTII,S$GLB,, | Performed by: INTERNAL MEDICINE

## 2022-08-11 NOTE — PROGRESS NOTES
"Subjective:       Patient ID: Darren Nicolas is a 42 y.o. male.    Vitals:  height is 6' 2" (1.88 m) and weight is 73.9 kg (163 lb). His oral temperature is 98.2 °F (36.8 °C). His blood pressure is 125/62 and his pulse is 81. His respiration is 16 and oxygen saturation is 98%.     Chief Complaint: Breakdown of skin    Patient presents with significant breakdown on his left ankle that began 1 year ago. Patient injured his ankle 18 months ago and states he wore a boot for 7-8 months. He states the breakdown began while wearing the boot.   Other  This is a chronic problem. The current episode started more than 1 year ago. The problem occurs constantly. The problem has been gradually worsening. Pertinent negatives include no arthralgias, joint swelling or numbness. The symptoms are aggravated by walking.       Musculoskeletal: Positive for pain. Negative for trauma, joint pain, joint swelling and abnormal ROM of joint.   Skin: Positive for wound.   Neurological: Negative for numbness and tingling.       Objective:      Physical Exam      Pulses 2+ in DP and PT arteries. Sensation intact. ROM of ankle is normal. [place] negative.         XR ANKLE COMPLETE 3 VIEW LEFT    Result Date: 8/11/2022  EXAMINATION: XR ANKLE COMPLETE 3 VIEW LEFT CLINICAL HISTORY: Non-healing ulcer overlying medial malleolus concern for osteo/gangrene; Non-pressure chronic ulcer of unspecified part of left lower leg with fat layer exposed TECHNIQUE: AP, lateral and oblique views of the left ankle were performed. COMPARISON: 07/23/2021. FINDINGS: The bone mineralization is within normal limits.  There is no cortical step-off.  There is no evidence of periostitis. The joint spaces are maintained.  There is soft tissue gas overlying the superior aspect of the medial malleolus.  No radiopaque foreign body is identified.     Soft tissue gas overlying the medial aspect of the left ankle, concerning for soft tissue infection.  No osseous reactive " changes.  Additional evaluation, as clinically warranted. Electronically signed by: Jermaine Lu MD Date:    08/11/2022 Time:    16:44      Assessment:       1. Ulcer of left lower extremity with fat layer exposed    2. Venous insufficiency of left lower extremity          Plan:         Ulcer of left lower extremity with fat layer exposed  -     XR ANKLE COMPLETE 3 VIEW LEFT; Future; Expected date: 08/11/2022  -     POCT Glucose, Hand-Held Device  -     Refer to Emergency Dept.    Venous insufficiency of left lower extremity  -     Refer to Emergency Dept.    Findgins from XR concerning for gas in soft tissues. Will refer to ER for lab work and advanced imaging for further characterization. Discussed this with patient who agreed to go to the ER after my discussion.

## 2022-08-11 NOTE — PROGRESS NOTES
Subjective:       Patient ID: Darren Nicolas is a 42 y.o. male.    Vitals:  vitals were not taken for this visit.     Chief Complaint: No chief complaint on file.    HPI  ROS    Objective:      Physical Exam      Assessment:       No diagnosis found.      Plan:         There are no diagnoses linked to this encounter.

## 2022-08-11 NOTE — PATIENT INSTRUCTIONS
Based on my evaluation, I think you need further evaluation in the ER. Please go to the [facility] ER on [facility].

## 2022-08-12 ENCOUNTER — HOSPITAL ENCOUNTER (INPATIENT)
Facility: HOSPITAL | Age: 42
LOS: 1 days | Discharge: HOME | DRG: 605 | End: 2022-08-13
Attending: EMERGENCY MEDICINE | Admitting: STUDENT IN AN ORGANIZED HEALTH CARE EDUCATION/TRAINING PROGRAM
Payer: COMMERCIAL

## 2022-08-12 DIAGNOSIS — R07.9 CHEST PAIN: ICD-10-CM

## 2022-08-12 DIAGNOSIS — T14.8XXA WOUND INFECTION: ICD-10-CM

## 2022-08-12 DIAGNOSIS — M86.9 OSTEOMYELITIS OF LEFT ANKLE, UNSPECIFIED TYPE: ICD-10-CM

## 2022-08-12 DIAGNOSIS — L03.116 CELLULITIS OF LEFT LOWER EXTREMITY: Primary | ICD-10-CM

## 2022-08-12 DIAGNOSIS — I87.2 VENOUS INSUFFICIENCY OF BOTH LOWER EXTREMITIES: ICD-10-CM

## 2022-08-12 DIAGNOSIS — L08.9 WOUND INFECTION: ICD-10-CM

## 2022-08-12 LAB
ALBUMIN SERPL-MCNC: 4 G/DL (ref 3.3–5.5)
ALLENS TEST: ABNORMAL
ALP SERPL-CCNC: 62 U/L (ref 42–141)
BILIRUB SERPL-MCNC: 0.5 MG/DL (ref 0.2–1.6)
BILIRUBIN, POC UA: NEGATIVE
BLOOD, POC UA: NEGATIVE
BUN SERPL-MCNC: 19 MG/DL (ref 7–22)
CALCIUM SERPL-MCNC: 9.6 MG/DL (ref 8–10.3)
CHLORIDE SERPL-SCNC: 105 MMOL/L (ref 98–108)
CLARITY, POC UA: CLEAR
COLOR, POC UA: YELLOW
CREAT SERPL-MCNC: 0.8 MG/DL (ref 0.6–1.2)
CRP SERPL-MCNC: 2.5 MG/L (ref 0–8.2)
CTP QC/QA: YES
ERYTHROCYTE [SEDIMENTATION RATE] IN BLOOD BY WESTERGREN METHOD: 0 MM/HR (ref 0–10)
GLUCOSE SERPL-MCNC: 83 MG/DL (ref 73–118)
GLUCOSE, POC UA: NEGATIVE
HCO3 UR-SCNC: 24.2 MMOL/L (ref 24–28)
HCT, POC: NORMAL
HGB, POC: NORMAL (ref 14–18)
KETONES, POC UA: NEGATIVE
LDH SERPL L TO P-CCNC: 1.85 MMOL/L (ref 0.5–2.2)
LEUKOCYTE EST, POC UA: NEGATIVE
MCH, POC: NORMAL
MCHC, POC: NORMAL
MCV, POC: NORMAL
MPV, POC: NORMAL
NITRITE, POC UA: NEGATIVE
PCO2 BLDA: 41.6 MMHG (ref 35–45)
PH SMN: 7.37 [PH] (ref 7.35–7.45)
PH UR STRIP: 5.5 [PH]
PO2 BLDA: 100 MMHG (ref 40–60)
POC ALT (SGPT): 27 U/L (ref 10–47)
POC AST (SGOT): 30 U/L (ref 11–38)
POC BE: -1 MMOL/L
POC PLATELET COUNT: NORMAL
POC SATURATED O2: 98 % (ref 95–100)
POC TCO2: 25 MMOL/L (ref 18–33)
POC TCO2: 25 MMOL/L (ref 24–29)
POCT GLUCOSE: 81 MG/DL (ref 70–110)
POTASSIUM BLD-SCNC: 4.4 MMOL/L (ref 3.6–5.1)
PROTEIN, POC UA: NEGATIVE
PROTEIN, POC: 6.7 G/DL (ref 6.4–8.1)
RBC, POC: NORMAL
RDW, POC: NORMAL
SAMPLE: ABNORMAL
SARS-COV-2 RDRP RESP QL NAA+PROBE: NEGATIVE
SITE: ABNORMAL
SODIUM BLD-SCNC: 140 MMOL/L (ref 128–145)
SPECIFIC GRAVITY, POC UA: 1.01
UROBILINOGEN, POC UA: 0.2 E.U./DL
WBC, POC: NORMAL

## 2022-08-12 PROCEDURE — 25000003 PHARM REV CODE 250: Performed by: STUDENT IN AN ORGANIZED HEALTH CARE EDUCATION/TRAINING PROGRAM

## 2022-08-12 PROCEDURE — 85025 COMPLETE CBC W/AUTO DIFF WBC: CPT | Mod: ER

## 2022-08-12 PROCEDURE — 25000003 PHARM REV CODE 250: Mod: ER | Performed by: NURSE PRACTITIONER

## 2022-08-12 PROCEDURE — 96365 THER/PROPH/DIAG IV INF INIT: CPT | Mod: ER

## 2022-08-12 PROCEDURE — 99285 EMERGENCY DEPT VISIT HI MDM: CPT | Mod: 25,ER

## 2022-08-12 PROCEDURE — 86140 C-REACTIVE PROTEIN: CPT | Performed by: NURSE PRACTITIONER

## 2022-08-12 PROCEDURE — 82962 GLUCOSE BLOOD TEST: CPT | Mod: ER

## 2022-08-12 PROCEDURE — 85652 RBC SED RATE AUTOMATED: CPT | Performed by: NURSE PRACTITIONER

## 2022-08-12 PROCEDURE — 82803 BLOOD GASES ANY COMBINATION: CPT | Mod: ER

## 2022-08-12 PROCEDURE — 96375 TX/PRO/DX INJ NEW DRUG ADDON: CPT | Mod: ER

## 2022-08-12 PROCEDURE — 63600175 PHARM REV CODE 636 W HCPCS: Mod: ER | Performed by: NURSE PRACTITIONER

## 2022-08-12 PROCEDURE — 11000001 HC ACUTE MED/SURG PRIVATE ROOM

## 2022-08-12 PROCEDURE — 80053 COMPREHEN METABOLIC PANEL: CPT | Mod: ER

## 2022-08-12 PROCEDURE — 81003 URINALYSIS AUTO W/O SCOPE: CPT | Mod: ER

## 2022-08-12 PROCEDURE — U0002 COVID-19 LAB TEST NON-CDC: HCPCS | Mod: ER | Performed by: NURSE PRACTITIONER

## 2022-08-12 RX ORDER — NALOXONE HCL 0.4 MG/ML
0.02 VIAL (ML) INJECTION
Status: DISCONTINUED | OUTPATIENT
Start: 2022-08-12 | End: 2022-08-13 | Stop reason: HOSPADM

## 2022-08-12 RX ORDER — TALC
6 POWDER (GRAM) TOPICAL NIGHTLY
Status: DISCONTINUED | OUTPATIENT
Start: 2022-08-12 | End: 2022-08-13 | Stop reason: HOSPADM

## 2022-08-12 RX ORDER — IBUPROFEN 200 MG
16 TABLET ORAL
Status: DISCONTINUED | OUTPATIENT
Start: 2022-08-12 | End: 2022-08-13 | Stop reason: HOSPADM

## 2022-08-12 RX ORDER — LAMOTRIGINE 150 MG/1
600 TABLET ORAL DAILY
Status: DISCONTINUED | OUTPATIENT
Start: 2022-08-13 | End: 2022-08-12

## 2022-08-12 RX ORDER — ACETAMINOPHEN 325 MG/1
650 TABLET ORAL EVERY 8 HOURS PRN
Status: DISCONTINUED | OUTPATIENT
Start: 2022-08-12 | End: 2022-08-13 | Stop reason: HOSPADM

## 2022-08-12 RX ORDER — IBUPROFEN 200 MG
24 TABLET ORAL
Status: DISCONTINUED | OUTPATIENT
Start: 2022-08-12 | End: 2022-08-13 | Stop reason: HOSPADM

## 2022-08-12 RX ORDER — GLUCAGON 1 MG
1 KIT INJECTION
Status: DISCONTINUED | OUTPATIENT
Start: 2022-08-12 | End: 2022-08-13 | Stop reason: HOSPADM

## 2022-08-12 RX ORDER — LAMOTRIGINE 100 MG/1
200 TABLET ORAL DAILY
Status: DISCONTINUED | OUTPATIENT
Start: 2022-08-13 | End: 2022-08-13 | Stop reason: HOSPADM

## 2022-08-12 RX ORDER — VANCOMYCIN HCL IN 5 % DEXTROSE 1G/250ML
15 PLASTIC BAG, INJECTION (ML) INTRAVENOUS
Status: COMPLETED | OUTPATIENT
Start: 2022-08-12 | End: 2022-08-12

## 2022-08-12 RX ORDER — ONDANSETRON 2 MG/ML
4 INJECTION INTRAMUSCULAR; INTRAVENOUS EVERY 8 HOURS PRN
Status: DISCONTINUED | OUTPATIENT
Start: 2022-08-12 | End: 2022-08-13 | Stop reason: HOSPADM

## 2022-08-12 RX ORDER — LAMOTRIGINE 150 MG/1
600 TABLET ORAL NIGHTLY
Status: DISCONTINUED | OUTPATIENT
Start: 2022-08-12 | End: 2022-08-13 | Stop reason: HOSPADM

## 2022-08-12 RX ORDER — LEVETIRACETAM 500 MG/1
1500 TABLET ORAL 2 TIMES DAILY
Status: DISCONTINUED | OUTPATIENT
Start: 2022-08-12 | End: 2022-08-13 | Stop reason: HOSPADM

## 2022-08-12 RX ORDER — POLYETHYLENE GLYCOL 3350 17 G/17G
17 POWDER, FOR SOLUTION ORAL 2 TIMES DAILY PRN
Status: DISCONTINUED | OUTPATIENT
Start: 2022-08-12 | End: 2022-08-13 | Stop reason: HOSPADM

## 2022-08-12 RX ADMIN — Medication 6 MG: at 11:08

## 2022-08-12 RX ADMIN — LAMOTRIGINE 600 MG: 150 TABLET ORAL at 11:08

## 2022-08-12 RX ADMIN — VANCOMYCIN HYDROCHLORIDE 1000 MG: 1 INJECTION, POWDER, LYOPHILIZED, FOR SOLUTION INTRAVENOUS at 07:08

## 2022-08-12 RX ADMIN — SODIUM CHLORIDE 1000 ML: 0.9 INJECTION, SOLUTION INTRAVENOUS at 06:08

## 2022-08-12 RX ADMIN — LEVETIRACETAM 1500 MG: 500 TABLET, FILM COATED ORAL at 11:08

## 2022-08-12 RX ADMIN — PIPERACILLIN AND TAZOBACTAM 4.5 G: 4; .5 INJECTION, POWDER, LYOPHILIZED, FOR SOLUTION INTRAVENOUS; PARENTERAL at 06:08

## 2022-08-12 NOTE — ED PROVIDER NOTES
Encounter Date: 8/12/2022    SCRIBE #1 NOTE: I, Jigna Roe, am scribing for, and in the presence of,  ELSI Negrete. I have scribed the following portions of the note - Other sections scribed: HPI; ROS; PE.       History     Chief Complaint   Patient presents with    Wound Check     Pt here for evaluation of chronic wound to left inner ankle x1year.  Reports was seen at urgent care and was instructed to come to ER.      Darren Nicolas is a 42 y.o. male with Hx of Seizures and Dermatosis who presents to the ED for chief complaint of chronic wound to the medial left ankle worsening 1 week ago. Patient reports he's had the wound for 6 months and it was healing and scabbed, but worsened when he accidentally kicked the wound 1 week ago. He states the wound has drainage present. Patient reports he went to Urgent Care 1 day ago and was advised to go to [facility] main campus for further evaluation and possible admission. Patient denies rash, fever, chest pain, SOB, numbness, weakness, tingling, abdominal pain, back pain, dysuria, hematuria, nausea, vomiting, diarrhea, or any other complaints.  Patient denies pain and has not taken any medications for the symptoms. No alleviating/aggravating factors.              The history is provided by the patient. No  was used.     Review of patient's allergies indicates:  No Known Allergies  Past Medical History:   Diagnosis Date    Seizure      History reviewed. No pertinent surgical history.  Family History   Problem Relation Age of Onset    Melanoma Neg Hx      Social History     Tobacco Use    Smoking status: Current Some Day Smoker     Types: Cigarettes    Smokeless tobacco: Never Used   Substance Use Topics    Alcohol use: Yes     Comment: Daily Beer Drinker    Drug use: Never     Review of Systems   Constitutional: Negative for chills and fever.   HENT: Negative for congestion, ear pain, rhinorrhea and sore throat.    Eyes: Negative for pain,  discharge and redness.   Respiratory: Negative for cough and shortness of breath.    Cardiovascular: Negative for chest pain.   Gastrointestinal: Negative for abdominal pain, diarrhea, nausea and vomiting.   Genitourinary: Negative for dysuria.   Musculoskeletal: Negative for back pain, neck pain and neck stiffness.   Skin: Positive for wound. Negative for rash.   Neurological: Negative for dizziness, weakness, light-headedness, numbness and headaches.   Psychiatric/Behavioral: Negative for confusion.       Physical Exam     Initial Vitals [08/12/22 1700]   BP Pulse Resp Temp SpO2   131/65 77 20 98.9 °F (37.2 °C) 96 %      MAP       --         Physical Exam    Nursing note and vitals reviewed.  Constitutional: Vital signs are normal. He appears well-developed and well-nourished. He is cooperative.  Non-toxic appearance. He does not appear ill.   HENT:   Head: Normocephalic and atraumatic.   Right Ear: External ear normal.   Left Ear: External ear normal.   Nose: Nose normal.   Mouth/Throat: Oropharynx is clear and moist.   Eyes: Conjunctivae and EOM are normal. Pupils are equal, round, and reactive to light.   Neck: Neck supple.   Normal range of motion.  Cardiovascular: Normal rate and regular rhythm.   Pulses:       Dorsalis pedis pulses are 2+ on the left side.        Posterior tibial pulses are 2+ on the left side.   Pulmonary/Chest: Effort normal and breath sounds normal.   Abdominal: Abdomen is soft. There is no rebound and no guarding.   Musculoskeletal:         General: Normal range of motion.      Cervical back: Normal range of motion and neck supple.      Left ankle: Normal range of motion.      Comments: Normal ROM of the left ankle.      Neurological: He is alert and oriented to person, place, and time. No cranial nerve deficit. GCS eye subscore is 4. GCS verbal subscore is 5. GCS motor subscore is 6.   Skin: Skin is warm, dry and intact. Capillary refill takes less than 2 seconds. Lesion noted. No rash  noted.   2 cm ulceration with surrounding erythema to the medial aspect of the left ankle; lesion is non-tender. Normal sensation. Capillary refill less than 2 seconds; normal ROM, strength, and sensation-see photo   Psychiatric: He has a normal mood and affect. His speech is normal and behavior is normal. Thought content normal.           ED Course   Procedures  Labs Reviewed   ISTAT PROCEDURE - Abnormal; Notable for the following components:       Result Value    POC PO2 100 (*)     All other components within normal limits   CULTURE, BLOOD   CULTURE, BLOOD   SEDIMENTATION RATE   C-REACTIVE PROTEIN   POCT CBC   SARS-COV-2 RDRP GENE    Narrative:     This test utilizes isothermal nucleic acid amplification   technology to detect the SARS-CoV-2 RdRp nucleic acid segment.   The analytical sensitivity (limit of detection) is 125 genome   equivalents/mL.   A POSITIVE result implies infection with the SARS-CoV-2 virus;   the patient is presumed to be contagious.     A NEGATIVE result means that SARS-CoV-2 nucleic acids are not   present above the limit of detection. A NEGATIVE result should be   treated as presumptive. It does not rule out the possibility of   COVID-19 and should not be the sole basis for treatment decisions.   If COVID-19 is strongly suspected based on clinical and exposure   history, re-testing using an alternate molecular assay should be   considered.   This test is only for use under the Food and Drug   Administration s Emergency Use Authorization (EUA).   Commercial kits are provided by [employer].   Performance characteristics of the EUA have been independently   verified by [facility] Department of   Pathology and Laboratory Medicine.   _________________________________________________________________   The authorized Fact Sheet for Healthcare Providers and the authorized Fact   Sheet for Patients of the ID NOW COVID-19 are available on the FDA   website:      https://www.fda.gov/media/516416/download  https://www.fda.gov/media/008837/download          POCT URINALYSIS W/O SCOPE   POCT URINALYSIS W/O SCOPE   POCT GLUCOSE, HAND-HELD DEVICE   POCT CMP   POCT CMP              Imaging Results          X-Ray Ankle Complete Left (Final result)  Result time 08/12/22 17:33:57    Final result by Madison Hager MD (08/12/22 17:33:57)                 Impression:      No acute osseous abnormality identified.      Electronically signed by: Madison Hager MD  Date:    08/12/2022  Time:    17:33             Narrative:    EXAMINATION:  XR ANKLE COMPLETE 3 VIEW LEFT    CLINICAL HISTORY:  Other injury of unspecified body region, initial encounter    TECHNIQUE:  AP, lateral and oblique views of the left ankle were performed.    COMPARISON:  08/11/2022.    FINDINGS:  No evidence of acute displaced fracture, dislocation, or osseous destructive process.  Ankle mortise is maintained.  Soft tissue injury is seen at the posteromedial aspect of the ankle.                                 Medications   vancomycin in dextrose 5 % 1 gram/250 mL IVPB 1,000 mg (1,000 mg Intravenous New Bag 8/12/22 1928)   sodium chloride 0.9% bolus 1,000 mL (1,000 mLs Intravenous New Bag 8/12/22 1845)   piperacillin-tazobactam 4.5 g in dextrose 5 % 100 mL IVPB (ready to mix system) (0 g Intravenous Stopped 8/12/22 1917)     Medical Decision Making:   History:   Old Medical Records: I decided to obtain old medical records.  Independently Interpreted Test(s):   I have ordered and independently interpreted X-rays - see prior notes.  Clinical Tests:   Lab Tests: Reviewed and Ordered  Radiological Study: Ordered and Reviewed       APC / Resident Notes:   This is an evaluation of a 42 y.o. male that presents to the Emergency Department for wound to left ankle    Physical Exam shows a non-toxic, afebrile, and well appearing male. 2 cm ulceration with surrounding erythema to the medial aspect of the left ankle; lesion is  non-tender. Normal sensation. Capillary refill less than 2 seconds; normal ROM, strength, and sensation    Vital signs are reassuring. If available, previous records reviewed.   RESULTS: Labs stable, Blood cultures, ESR, and CRP pending; Xray cannot r/o osteomyelitis    Discussed patient with Dr. Liriano who will admit patient for IV ABX and MRI to r/o osteo.  Patient agrees to admission and EMS transport.      This case was discussed with and the patient was independently examined by my attending, Dr. Howell who is in agreement with my assessment and plan.        Scribe Attestation:   Scribe #1: I performed the above scribed service and the documentation accurately describes the services I performed. I attest to the accuracy of the note.    Attending Attestation:     Physician Attestation Statement for NP/PA:   I have conducted a face to face encounter with this patient in addition to the NP/PA, due to NP/PA Request    Other NP/PA Attestation Additions:    History of Present Illness: Darren Nicolas is a 42 y.o. male with Hx of Seizures and Dermatosis who presents to the ED for chief complaint of chronic wound to the medial left ankle worsening 1 week ago. Patient reports he's had the wound for 6 months and it was healing and scabbed, but worsened when he accidentally kicked the wound 1 week ago.     ( See PA note for details)   Physical Exam: L ankle with 2 cm stage II ulceration with surrounding erythema and mild tenderness.    ( See PA note for details)   Medical Decision Making: Workup shows concern for osteomyelitis.  Patient has been consulted for admission to be transferred for further workup and management.  Will sign out to Dr. Antonio while awaiting EMS transport.    7:02 PM 8/12/2022  Roxana Howell MD      DISCLAIMER: This note was prepared with [employer] voice recognition transcription software. Garbled syntax, mangled pronouns, and other bizarre constructions may be attributed to that software  system                       Scribe attestation: I, NABIL Negrete, personally performed the services described in this documentation.  All medical record entries made by the scribe were at my direction and in my presence.  I have reviewed the chart and agree that the record reflects my personal performance and is accurate and complete.    Clinical Impression:   Final diagnoses:  [T14.8XXA, L08.9] Wound infection  [L03.116] Cellulitis of left lower extremity (Primary)  [M86.9] Osteomyelitis of left ankle, unspecified type          ED Disposition Condition    Admit               ELSI Sanchez  08/12/22 1940

## 2022-08-12 NOTE — LETTER
Patient: Darren Nicolas  YOB: 1980  Date: 8/12/2022 Time: 7:01 PM  Location: [facility]    Leaving the Hospital Against Medical Advice    Chart #:[id number]    This will certify that I, the undersigned,    ______________________________________________________________________    A patient in the above named medical center, having requested discharge and removal from the medical center against the advice of my attending physician(s), hereby release [facility], its physicians, officers and employees, severally and individually, from any and all liability of any nature whatsoever for any injury or harm or complication of any kind that may result directly or indirectly, by reason of my terminating my stay as a patient at [facility] and my departure from [facility], and hereby waive any and all rights of action I may now have or later acquire as a result of my voluntary departure from [facility] and the termination of my stay as a patient therein.    This release is made with the full knowledge of the danger that may result from the action which I am taking.      Date:_______________________                         ___________________________                                                                                    Patient/Legal Representative    Witness:        ____________________________                          ___________________________  Nurse                                                                        Physician

## 2022-08-13 VITALS
HEIGHT: 74 IN | WEIGHT: 168.88 LBS | RESPIRATION RATE: 18 BRPM | BODY MASS INDEX: 21.67 KG/M2 | DIASTOLIC BLOOD PRESSURE: 62 MMHG | HEART RATE: 68 BPM | OXYGEN SATURATION: 98 % | TEMPERATURE: 98 F | SYSTOLIC BLOOD PRESSURE: 117 MMHG

## 2022-08-13 PROBLEM — S81.802A LEG WOUND, LEFT, INITIAL ENCOUNTER: Status: ACTIVE | Noted: 2022-08-13

## 2022-08-13 PROBLEM — G40.909 SEIZURE DISORDER: Status: ACTIVE | Noted: 2022-08-13

## 2022-08-13 PROBLEM — T14.8XXA WOUND INFECTION: Status: ACTIVE | Noted: 2022-08-13

## 2022-08-13 PROBLEM — L08.9 WOUND INFECTION: Status: ACTIVE | Noted: 2022-08-13

## 2022-08-13 LAB
ALBUMIN SERPL BCP-MCNC: 3.5 G/DL (ref 3.5–5.2)
ALP SERPL-CCNC: 54 U/L (ref 55–135)
ALT SERPL W/O P-5'-P-CCNC: 15 U/L (ref 10–44)
ANION GAP SERPL CALC-SCNC: 7 MMOL/L (ref 8–16)
AST SERPL-CCNC: 17 U/L (ref 10–40)
BASOPHILS # BLD AUTO: 0.06 K/UL (ref 0–0.2)
BASOPHILS NFR BLD: 0.8 % (ref 0–1.9)
BILIRUB SERPL-MCNC: 0.4 MG/DL (ref 0.1–1)
BUN SERPL-MCNC: 15 MG/DL (ref 6–20)
CALCIUM SERPL-MCNC: 8.9 MG/DL (ref 8.7–10.5)
CHLORIDE SERPL-SCNC: 111 MMOL/L (ref 95–110)
CO2 SERPL-SCNC: 23 MMOL/L (ref 23–29)
CREAT SERPL-MCNC: 0.7 MG/DL (ref 0.5–1.4)
DIFFERENTIAL METHOD: ABNORMAL
EOSINOPHIL # BLD AUTO: 0.3 K/UL (ref 0–0.5)
EOSINOPHIL NFR BLD: 4.5 % (ref 0–8)
ERYTHROCYTE [DISTWIDTH] IN BLOOD BY AUTOMATED COUNT: 12.6 % (ref 11.5–14.5)
EST. GFR  (NO RACE VARIABLE): >60 ML/MIN/1.73 M^2
GLUCOSE SERPL-MCNC: 92 MG/DL (ref 70–110)
HCT VFR BLD AUTO: 39.9 % (ref 40–54)
HGB BLD-MCNC: 13.5 G/DL (ref 14–18)
IMM GRANULOCYTES # BLD AUTO: 0.02 K/UL (ref 0–0.04)
IMM GRANULOCYTES NFR BLD AUTO: 0.3 % (ref 0–0.5)
INR PPP: 1 (ref 0.8–1.2)
LYMPHOCYTES # BLD AUTO: 2 K/UL (ref 1–4.8)
LYMPHOCYTES NFR BLD: 26.8 % (ref 18–48)
MAGNESIUM SERPL-MCNC: 1.8 MG/DL (ref 1.6–2.6)
MCH RBC QN AUTO: 33.2 PG (ref 27–31)
MCHC RBC AUTO-ENTMCNC: 33.8 G/DL (ref 32–36)
MCV RBC AUTO: 98 FL (ref 82–98)
MONOCYTES # BLD AUTO: 1 K/UL (ref 0.3–1)
MONOCYTES NFR BLD: 13.8 % (ref 4–15)
NEUTROPHILS # BLD AUTO: 3.9 K/UL (ref 1.8–7.7)
NEUTROPHILS NFR BLD: 53.8 % (ref 38–73)
NRBC BLD-RTO: 0 /100 WBC
PHOSPHATE SERPL-MCNC: 3.5 MG/DL (ref 2.7–4.5)
PLATELET # BLD AUTO: 228 K/UL (ref 150–450)
PMV BLD AUTO: 9.7 FL (ref 9.2–12.9)
POTASSIUM SERPL-SCNC: 4.4 MMOL/L (ref 3.5–5.1)
PROT SERPL-MCNC: 6 G/DL (ref 6–8.4)
PROTHROMBIN TIME: 10.3 SEC (ref 9–12.5)
RBC # BLD AUTO: 4.07 M/UL (ref 4.6–6.2)
SODIUM SERPL-SCNC: 141 MMOL/L (ref 136–145)
WBC # BLD AUTO: 7.31 K/UL (ref 3.9–12.7)

## 2022-08-13 PROCEDURE — 84100 ASSAY OF PHOSPHORUS: CPT | Performed by: STUDENT IN AN ORGANIZED HEALTH CARE EDUCATION/TRAINING PROGRAM

## 2022-08-13 PROCEDURE — 99223 PR INITIAL HOSPITAL CARE,LEVL III: ICD-10-PCS | Mod: ,,, | Performed by: PODIATRIST

## 2022-08-13 PROCEDURE — 80177 DRUG SCRN QUAN LEVETIRACETAM: CPT | Performed by: STUDENT IN AN ORGANIZED HEALTH CARE EDUCATION/TRAINING PROGRAM

## 2022-08-13 PROCEDURE — 99223 1ST HOSP IP/OBS HIGH 75: CPT | Mod: ,,, | Performed by: PODIATRIST

## 2022-08-13 PROCEDURE — 80053 COMPREHEN METABOLIC PANEL: CPT | Performed by: STUDENT IN AN ORGANIZED HEALTH CARE EDUCATION/TRAINING PROGRAM

## 2022-08-13 PROCEDURE — 83735 ASSAY OF MAGNESIUM: CPT | Performed by: STUDENT IN AN ORGANIZED HEALTH CARE EDUCATION/TRAINING PROGRAM

## 2022-08-13 PROCEDURE — 25000003 PHARM REV CODE 250: Performed by: STUDENT IN AN ORGANIZED HEALTH CARE EDUCATION/TRAINING PROGRAM

## 2022-08-13 PROCEDURE — 85025 COMPLETE CBC W/AUTO DIFF WBC: CPT | Performed by: STUDENT IN AN ORGANIZED HEALTH CARE EDUCATION/TRAINING PROGRAM

## 2022-08-13 PROCEDURE — 85610 PROTHROMBIN TIME: CPT | Performed by: STUDENT IN AN ORGANIZED HEALTH CARE EDUCATION/TRAINING PROGRAM

## 2022-08-13 PROCEDURE — 80175 DRUG SCREEN QUAN LAMOTRIGINE: CPT | Performed by: STUDENT IN AN ORGANIZED HEALTH CARE EDUCATION/TRAINING PROGRAM

## 2022-08-13 RX ORDER — ENOXAPARIN SODIUM 100 MG/ML
40 INJECTION SUBCUTANEOUS EVERY 24 HOURS
Status: DISCONTINUED | OUTPATIENT
Start: 2022-08-13 | End: 2022-08-13 | Stop reason: HOSPADM

## 2022-08-13 RX ADMIN — LEVETIRACETAM 1500 MG: 500 TABLET, FILM COATED ORAL at 08:08

## 2022-08-13 RX ADMIN — LAMOTRIGINE 200 MG: 100 TABLET ORAL at 08:08

## 2022-08-13 NOTE — PLAN OF CARE
[place] [facility] Surg  Discharge Final Note    Primary Care Provider: ELSI Esparza    Expected Discharge Date: 8/13/2022     All Case Management (CM) needs have been addressed; Nurse Sadia informed that patient is cleared from CM standpoint      Final Discharge Note (most recent)     Final Note - 08/13/22 1605        Final Note    Assessment Type Final Discharge Note     Anticipated Discharge Disposition Home or Self Care     What phone number can be called within the next 1-3 days to see how you are doing after discharge? [phone]     Hospital Resources/Appts/Education Provided Provided patient/caregiver with written discharge plan information;Provided education on problems/symptoms using teachback;Appointments scheduled by Navigator/Coordinator        Post-Acute Status    Post-Acute Authorization Other     Other Status No Post-Acute Service Needs     Discharge Delays None known at this time                 Important Message from Medicare             Contact Info     Leodan Osuna DPM   Specialty: Podiatry    [address] [facility] [address]   Phone: [phone]       Next Steps: Follow up in 1 week(s)    Instructions: Please call office on Monday to arrange a, HOSPITAL DISCHARGE FOLLOW UP VISIT, in 1 week    ELSI Esparza   Specialty: Internal Medicine   Relationship: PCP - General    [address] [facility] [address]   Phone: [phone]       Next Steps: Follow up in 3 day(s)    Instructions: Please call office on Monday to arrange a, HOSPITAL DISCHARGE FOLLOW UP VISIT in 3 days    Felipe Egan MD   Specialty: Neurology    [address] [facility] [address]   Phone: [phone]       Next Steps: Follow up in 1 week(s)    Instructions: Please call office on Monday to arrange a, HOSPITAL DISCHARGE FOLLOW UP VISIT, in 1-2 weeks

## 2022-08-13 NOTE — CONSULTS
[facility] Surg  Podiatry  Consult Note    Patient Name: Darren Nicolas  MRN: [id number]  Admission Date: 8/12/2022  Hospital Length of Stay: 1 days  Attending Physician: Kurtis Mcgregor III, MD  Primary Care Provider: ELSI Esparza     Inpatient consult to Podiatry  Consult performed by: Leodan Osuna DPM  Consult ordered by: Edward Liriano MD        Subjective:     History of Present Illness:  42 year old male with hx of seizures, tobacco, and Etoh presented to ED for check of chronic left ankle wound x 1 year on the instruction of urgent care. Patient was admitted to [facility] for wound infection. He notes he accidentally kicked himself about a week ago and this made the wound worse. Patient relays the wound will not heal with the waterproof bandages he has been using, and he is not sure if the wound should be left open to air. He denies any pain/F/C/N/V. Podiatry was consulted for wound of left ankle.     At the time of initial encounter with the patient, VSS, afebrile, WBC 7.31, ESR 0, and CRP 2.5. Ankle X Rays taken at urgent read posterior pocket of soft tissue gas near the wound, near the proximal aspect of of kanger's triangle. Ankle X rays taken at ED did not show a definitive pocket of gas. MRI of the L ankle did not show any concern for soft tissue gas, fluid collection, or bony erosion.       Scheduled Meds:   enoxaparin  40 mg Subcutaneous Daily    lamoTRIgine  200 mg Oral Daily    lamoTRIgine  600 mg Oral QHS    levETIRAcetam  1,500 mg Oral BID    melatonin  6 mg Oral Nightly     Continuous Infusions:  PRN Meds:acetaminophen, glucagon (human recombinant), glucose, glucose, naloxone, ondansetron, polyethylene glycol    Review of patient's allergies indicates:  No Known Allergies     Past Medical History:   Diagnosis Date    Seizure      History reviewed. No pertinent surgical history.    Family History    None       Tobacco Use    Smoking status: Current Some Day Smoker     Types:  Cigarettes    Smokeless tobacco: Never Used   Substance and Sexual Activity    Alcohol use: Yes     Comment: Daily Beer Drinker    Drug use: Never    Sexual activity: Yes     Review of Systems   Constitutional:  Negative for activity change, appetite change, chills, diaphoresis, fatigue, fever and unexpected weight change.   HENT:  Negative for hearing loss.    Eyes:  Negative for visual disturbance.   Cardiovascular:  Negative for chest pain and leg swelling.   Gastrointestinal:  Negative for abdominal pain, constipation, diarrhea, nausea and vomiting.   Musculoskeletal:  Negative for arthralgias and joint swelling.   Skin:  Positive for wound (Superficial partial thickness at medial left leg). Negative for color change.   Neurological:  Negative for weakness and numbness.   Psychiatric/Behavioral:  Negative for agitation and behavioral problems.    Objective:     Vital Signs (Most Recent):  Temp: 98.4 °F (36.9 °C) (08/13/22 1218)  Pulse: 68 (08/13/22 1218)  Resp: 18 (08/13/22 1218)  BP: 117/62 (08/13/22 1218)  SpO2: 98 % (08/13/22 1218) Vital Signs (24h Range):  Temp:  [97.8 °F (36.6 °C)-98.4 °F (36.9 °C)] 98.4 °F (36.9 °C)  Pulse:  [50-68] 68  Resp:  [18-20] 18  SpO2:  [96 %-99 %] 98 %  BP: (117-143)/(58-82) 117/62     Weight: 76.6 kg (168 lb 14 oz)  Body mass index is 21.68 kg/m².    Foot Exam    General  General Appearance: appears stated age and healthy   Orientation: alert and oriented to person, place, and time       Right Foot/Ankle     Inspection and Palpation  Ecchymosis: none  Tenderness: none   Swelling: none   Skin Exam: skin intact; no cellulitis, no ulcer and no erythema     Neurovascular  Dorsalis pedis: 2+  Posterior tibial: 2+  Saphenous nerve sensation: normal  Tibial nerve sensation: normal  Superficial peroneal nerve sensation: normal  Deep peroneal nerve sensation: normal  Sural nerve sensation: normal    Comments  Helen type distribution of xerotic skin   Dermatitis with punctate like  lesions, similar appearance to porokeratosis.       Left Foot/Ankle      Inspection and Palpation  Skin Exam: dry skin and tinea; no callus, skin not intact, no cellulitis, no ulcer and no erythema     Neurovascular  Dorsalis pedis: 2+  Posterior tibial: 2+  Saphenous nerve sensation: normal  Tibial nerve sensation: normal  Superficial peroneal nerve sensation: normal  Deep peroneal nerve sensation: normal  Sural nerve sensation: normal    Comments  Dry stable partial thickness superficial medial leg wound. No erythema, No warmth, No edema, No Pain, No drainage, No fluctuance or crepitation, No Malodor      [place] type distribution of xerotic skin   Dermatitis with punctate like lesions, similar appearance to porokeratosis.     Laboratory:  CBC:   Recent Labs   Lab 08/13/22  0417   WBC 7.31   RBC 4.07*   HGB 13.5*   HCT 39.9*      MCV 98   MCH 33.2*   MCHC 33.8     CMP:   Recent Labs   Lab 08/13/22  0417   GLU 92   CALCIUM 8.9   ALBUMIN 3.5   PROT 6.0      K 4.4   CO2 23   *   BUN 15   CREATININE 0.7   ALKPHOS 54*   ALT 15   AST 17   BILITOT 0.4     CRP:   Recent Labs   Lab 08/12/22  1809   CRP 2.5     ESR:   Recent Labs   Lab 08/12/22  1809   SEDRATE 0       Diagnostic Results:  I have reviewed all pertinent imaging results/findings within the past 24 hours.  See HPI, reviewed both L ankle rays and L ankle MRI.       Assessment/Plan:     Leg wound, left, initial encounter  Partial thickness Leg wound with no signs of infection, dry, stable, involving only the epidermal and dermal layers. No signs of infection. Dermatitis with punctate lesions to b/l LE noted. Dermatitis compounded with mild tinea pedis appreciated.     - Dressed wound with a very think layer of Bactroban and mepilex boarder  - Discussed appropriate wound care with patient   - Recommended OTC clortrimazole for tinea pedis   - Recommend outpatient ambulatory referral for dermatology for punctate lesions   - Patient ok to  discharge from podiatry perspective, no PO ABx indicated   - Podiatry will sign off         Thank you for your consult. I will sign off. Please contact us if you have any additional questions.    Leodan Osuna DPM, PGY-2  Podiatry / Foot and Ankle Surgery   Page # [phone]  Secure chat preferred

## 2022-08-13 NOTE — HPI
Darren Nicolas is a 42 y.o. male who has a past medical history of Seizure, presented to the ED with a Chronic Wound of Left Ankle.  Patient seen by physician and sent in to urgent care worsening of chronic wound of his ankle, left medial.  Patient had an injury 18 months ago, for which he wore a boot for 7-8 months, but a year ago he started to have significant breakdown of his left ankle and ulcer resulted.  He has been receiving wound care, and for about 6 months it had improved, and thought it was going to repair, however worsened and 1 week ago he accidentally hit/injured again.  Patient states that there is some drainage present now and he went to urgent care because of this, where he was directed to the ED.  He does not have a pain associated with it, but it is aggravated when walking.  He was given a dose of vancomycin and Zosyn, prior from his departure from [facility].  Denies fever, CP, or SOB.

## 2022-08-13 NOTE — H&P
[facility]  History & Physical    Patient Name: Darren Nicolas  MRN: [id number]  Patient Class: IP- Inpatient  Admission Date: 8/12/2022  Attending Physician: Kurtis Mcgregor III, MD   Primary Care Provider: ELSI Esparza         Patient information was obtained from patient, past medical records and ER records.     Subjective:     Principal Problem:Wound infection    Chief Complaint:   Chief Complaint   Patient presents with    Wound Check             HPI:   Darren Nicolas is a 42 y.o. male who has a past medical history of Seizure, presented to the ED with a Chronic Wound of Left Ankle.  Patient seen by physician and sent in to urgent care worsening of chronic wound of his ankle, left medial.  Patient had an injury 18 months ago, for which he wore a boot for 7-8 months, but a year ago he started to have significant breakdown of his left ankle and ulcer resulted.  He has been receiving wound care, and for about 6 months it had improved, and thought it was going to repair, however worsened and 1 week ago he accidentally hit/injured again.  Patient states that there is some drainage present now and he went to urgent care because of this, where he was directed to the ED.  He does not have a pain associated with it, but it is aggravated when walking.  He was given a dose of vancomycin and Zosyn, prior from his departure from [facility].  Denies fever, CP, or SOB.      Past Medical History:   Diagnosis Date    Seizure        History reviewed. No pertinent surgical history.    Review of patient's allergies indicates:  No Known Allergies    No current facility-administered medications on file prior to encounter.     Current Outpatient Medications on File Prior to Encounter   Medication Sig    lamoTRIgine (LAMICTAL) 200 MG tablet TAKE 1 TABLET (200 MG TOTAL) BY MOUTH ONCE DAILY AND 3 TABLETS (600 MG TOTAL) EVERY EVENING.    levETIRAcetam (KEPPRA) 750 MG Tab Take 2 tablets (1,500 mg  total) by mouth 2 (two) times daily. NO FURTHER REFILL WITHOUT APPT    clobetasol 0.05% (TEMOVATE) 0.05 % Oint AAA hands and feet bid    hydrOXYzine HCL (ATARAX) 25 MG tablet TAKE 1 TABLET BY MOUTH THREE TIMES A DAY AS NEEDED FOR ITCHING. NO DRIVING/OPERATING MACHINERY    hyoscyamine (LEVSIN/SL) 0.125 mg Subl Place 1 tablet (0.125 mg total) under the tongue every 6 (six) hours as needed (nausea and cramps).    [employer] COVID-19 VACCINE, EUA, 0.5 mL Susp     MAGNESIUM ORAL Take by mouth 2 (two) times daily.    sildenafil (VIAGRA) 50 MG tablet Take 1 tablet (50 mg total) by mouth daily as needed for Erectile Dysfunction.     Family History    None       Tobacco Use    Smoking status: Current Some Day Smoker     Types: Cigarettes    Smokeless tobacco: Never Used   Substance and Sexual Activity    Alcohol use: Yes     Comment: Daily Beer Drinker    Drug use: Never    Sexual activity: Yes     Review of Systems   Constitutional:  Negative for activity change, fever and unexpected weight change.   HENT:  Negative for trouble swallowing and voice change.    Eyes:  Negative for photophobia and visual disturbance.   Respiratory:  Negative for cough and shortness of breath.    Cardiovascular:  Negative for chest pain, palpitations and leg swelling.   Gastrointestinal:  Negative for abdominal distention, abdominal pain, blood in stool, constipation, diarrhea, nausea and vomiting.   Endocrine: Negative for polydipsia and polyuria.   Genitourinary:  Negative for difficulty urinating, dysuria and hematuria.   Musculoskeletal:  Negative for neck pain and neck stiffness.   Skin:  Positive for wound. Negative for rash.   Allergic/Immunologic: Negative for immunocompromised state.   Neurological:  Negative for seizures, syncope, facial asymmetry and weakness.   Psychiatric/Behavioral:  Negative for agitation, behavioral problems and confusion.    Objective:     Vital Signs (Most Recent):  Temp: 97.9 °F (36.6 °C) (08/12/22  2214)  Pulse: 68 (08/12/22 2214)  Resp: 18 (08/12/22 2214)  BP: (!) 143/81 (08/12/22 2214)  SpO2: 99 % (08/12/22 2214)   Vital Signs (24h Range):  Temp:  [97.9 °F (36.6 °C)-98.9 °F (37.2 °C)] 97.9 °F (36.6 °C)  Pulse:  [50-77] 68  Resp:  [18-20] 18  SpO2:  [96 %-99 %] 99 %  BP: (131-143)/(65-82) 143/81     Weight: 76.6 kg (168 lb 14 oz)  Body mass index is 21.68 kg/m².    Physical Exam  Vitals and nursing note reviewed.   Constitutional:       General: He is not in acute distress.     Appearance: He is well-developed. He is not ill-appearing, toxic-appearing or diaphoretic.   HENT:      Head: Normocephalic and atraumatic.      Mouth/Throat:      Mouth: Mucous membranes are moist.      Pharynx: No oropharyngeal exudate.   Eyes:      General: No scleral icterus.     Pupils: Pupils are equal, round, and reactive to light.   Neck:      Thyroid: No thyromegaly.   Cardiovascular:      Rate and Rhythm: Normal rate and regular rhythm.      Heart sounds: No murmur heard.    No gallop.   Pulmonary:      Effort: Pulmonary effort is normal.      Breath sounds: Normal breath sounds. No stridor. No wheezing or rales.   Abdominal:      General: There is no distension.      Palpations: Abdomen is soft.      Tenderness: There is no abdominal tenderness. There is no guarding.   Musculoskeletal:         General: No swelling or deformity. Normal range of motion.      Cervical back: Normal range of motion. No rigidity.   Lymphadenopathy:      Cervical: No cervical adenopathy.   Skin:     General: Skin is warm.      Capillary Refill: Capillary refill takes less than 2 seconds.      Coloration: Skin is not jaundiced.      Findings: Lesion (ulcer on medial side of L ankle (see image)) present. No bruising.   Neurological:      General: No focal deficit present.      Mental Status: He is alert and oriented to person, place, and time.      Cranial Nerves: No cranial nerve deficit.   Psychiatric:         Mood and Affect: Mood normal.          Behavior: Behavior normal.         CRANIAL NERVES     CN III, IV, VI   Pupils are equal, round, and reactive to light.         Recent Results (from the past 24 hour(s))   POCT glucose    Collection Time: 08/12/22  5:41 PM   Result Value Ref Range    POCT Glucose 81 70 - 110 mg/dL   Sedimentation rate    Collection Time: 08/12/22  6:09 PM   Result Value Ref Range    Sed Rate 0 0 - 10 mm/Hr   C-reactive protein    Collection Time: 08/12/22  6:09 PM   Result Value Ref Range    CRP 2.5 0.0 - 8.2 mg/L   POCT CMP    Collection Time: 08/12/22  6:18 PM   Result Value Ref Range    Albumin, POC 4.0 3.3 - 5.5 g/dL    Alkaline Phosphatase, POC 62 42 - 141 U/L    ALT (SGPT), POC 27 10 - 47 U/L    AST (SGOT), POC 30 11 - 38 U/L    POC BUN 19 7 - 22 mg/dL    Calcium, POC 9.6 8.0 - 10.3 mg/dL    POC Chloride 105 98 - 108 mmol/L    POC Creatinine 0.8 0.6 - 1.2 mg/dL    POC Glucose 83 73 - 118 mg/dL    POC Potassium 4.4 3.6 - 5.1 mmol/L    POC Sodium 140 128 - 145 mmol/L    Bilirubin, POC 0.5 0.2 - 1.6 mg/dL    POC TCO2 25 18 - 33 mmol/L    Protein, POC 6.7 6.4 - 8.1 g/dL   POCT CBC    Collection Time: 08/12/22  6:20 PM   Result Value Ref Range    Hematocrit      Hemoglobin      RBC      WBC      MCV      MCH, POC      MCHC      RDW-CV      Platelet Count, POC      MPV     ISTAT PROCEDURE    Collection Time: 08/12/22  6:28 PM   Result Value Ref Range    POC PH 7.373 7.35 - 7.45    POC PCO2 41.6 35 - 45 mmHg    POC PO2 100 (HH) 40 - 60 mmHg    POC HCO3 24.2 24 - 28 mmol/L    POC BE -1 -2 to 2 mmol/L    POC SATURATED O2 98 95 - 100 %    POC Lactate 1.85 0.5 - 2.2 mmol/L    POC TCO2 25 24 - 29 mmol/L    Sample VENOUS     Site Other     Allens Test N/A    POCT URINALYSIS W/O SCOPE    Collection Time: 08/12/22  6:30 PM   Result Value Ref Range    Glucose, UA Negative     Bilirubin, UA Negative     Ketones, UA Negative     Spec Grav UA 1.010     Blood, UA Negative     PH, UA 5.5     Protein, UA Negative     Urobilinogen, UA 0.2 E.U./dL     Nitrite, UA Negative     Leukocytes, UA Negative     Color, UA Yellow     Clarity, UA Clear    POCT COVID-19 Rapid Screening    Collection Time: 08/12/22  6:43 PM   Result Value Ref Range    POC Rapid COVID Negative Negative     Acceptable Yes        Microbiology Results (last 7 days)       Procedure Component Value Units Date/Time    Blood Culture #1 **CANNOT BE ORDERED STAT** [[id number]] Collected: 08/12/22 1816    Order Status: Sent Specimen: Blood from Peripheral, Hand, Right Updated: 08/12/22 1816    Blood Culture #2 **CANNOT BE ORDERED STAT** [[id number]] Collected: 08/12/22 1812    Order Status: Sent Specimen: Blood from Peripheral, Antecubital, Right Updated: 08/12/22 1813             Imaging Results              X-Ray Ankle Complete Left (Final result)  Result time 08/12/22 17:33:57      Final result by Madison Hager MD (08/12/22 17:33:57)                   Impression:      No acute osseous abnormality identified.      Electronically signed by: Madison Hager MD  Date:    08/12/2022  Time:    17:33               Narrative:    EXAMINATION:  XR ANKLE COMPLETE 3 VIEW LEFT    CLINICAL HISTORY:  Other injury of unspecified body region, initial encounter    TECHNIQUE:  AP, lateral and oblique views of the left ankle were performed.    COMPARISON:  08/11/2022.    FINDINGS:  No evidence of acute displaced fracture, dislocation, or osseous destructive process.  Ankle mortise is maintained.  Soft tissue injury is seen at the posteromedial aspect of the ankle.                                             Assessment/Plan:     * Wound infection  · Ulcer on left medial ankle  · Chronic, however with acute changes concern for acute infection  · V +Z given in ED, will hold antibiotics for better in of the yield in case biopsy needed   · MRI ankle ordered  · Podiatry consult  · NPO at midnight in case biopsy      Venous insufficiency of both lower extremities  Noted        VTE Risk Mitigation (From  admission, onward)         Ordered     IP VTE HIGH RISK PATIENT  Once         08/12/22 2212     Place sequential compression device  Until discontinued         08/12/22 2212                   Edward Liriano MD  Department of Hospital Medicine   [facility]

## 2022-08-13 NOTE — NURSING
"Pt left unit walking to meet his ride home, states "I really don't want the w/c" NADN no c/o pain.  "

## 2022-08-13 NOTE — NURSING
Patient left unit via wheelchair with transporter for procedure.  RN charge nurse going with pt. No acute distress noted.

## 2022-08-13 NOTE — NURSING
Patient discharged per MD order.  IV removed.  Catheter tip intact.  No distress noted.  Discharge instructions explained.  AVS given to patient   Patient verbalized understanding.  VSS.  Afebrile.  No complaints of pain, N/V, diarrhea or SOB.  No new Rx.  Patient waiting on his ride home .

## 2022-08-13 NOTE — ASSESSMENT & PLAN NOTE
· Ulcer on left medial ankle, appears likely to be a venous insufficiency wound  · Chronic, however with acute changes concern for acute infection  · V +Z given in ED, will hold antibiotics for better in of the yield in case biopsy needed   · MRI ankle ordered  · Podiatry consult  · NPO at midnight in case biopsy  · RCRI of 0

## 2022-08-13 NOTE — NURSING
Patient arrived to [facility]  floor by stretcher via [facility] from [facility] ED.  Patient transferred to bed independently.  AAOX4.  Patient was oriented to room, information on whiteboard, and medication regimen.  Bed low, adequate lighting provided, side rails x2 up, call bell within reach.  Admission assessment completed. VSS.  Patient denied having sob, pain, and any acute distress at this time.  None observed.  Will continue to monitor and follow treatment plan.

## 2022-08-13 NOTE — NURSING
[facility]  Nurses Note -- 4 Eyes      8/13/2022       Skin assessed on: 8/12/2022 Wound noted to left lower extremity with dry clean dressing noted. Multiple intact dry patches of skin noted to bue. Multiple old scars that is intact  noted to ble.     [x] No Pressure Injuries Present    []Prevention Measures Documented    [] Yes LDA  for Pressure Injury Previously documented     [] Yes New Pressure Injury Discovered   [] LDA for New Pressure Injury Added      Attending RN:  Nevaeh Russ LPN     Second RN: Alondra Burleson, PCT

## 2022-08-13 NOTE — ASSESSMENT & PLAN NOTE
Partial thickness Leg wound with no signs of infection, dry, stable, involving only the epidermal and dermal layers. No signs of infection. Dermatitis with punctate lesions to b/l LE noted. Dermatitis compounded with mild tinea pedis appreciated.     - Dressed wound with a very think layer of Bactroban and mepilex boarder  - Discussed appropriate wound care with patient   - Recommended OTC clortrimazole for tinea pedis   - Recommend outpatient ambulatory referral for dermatology for punctate lesions   - Patient ok to discharge from podiatry perspective, no PO ABx indicated   - Podiatry will sign off

## 2022-08-13 NOTE — SUBJECTIVE & OBJECTIVE
Past Medical History:   Diagnosis Date    Seizure        History reviewed. No pertinent surgical history.    Review of patient's allergies indicates:  No Known Allergies    No current facility-administered medications on file prior to encounter.     Current Outpatient Medications on File Prior to Encounter   Medication Sig    lamoTRIgine (LAMICTAL) 200 MG tablet TAKE 1 TABLET (200 MG TOTAL) BY MOUTH ONCE DAILY AND 3 TABLETS (600 MG TOTAL) EVERY EVENING.    levETIRAcetam (KEPPRA) 750 MG Tab Take 2 tablets (1,500 mg total) by mouth 2 (two) times daily. NO FURTHER REFILL WITHOUT APPT    clobetasol 0.05% (TEMOVATE) 0.05 % Oint AAA hands and feet bid    hydrOXYzine HCL (ATARAX) 25 MG tablet TAKE 1 TABLET BY MOUTH THREE TIMES A DAY AS NEEDED FOR ITCHING. NO DRIVING/OPERATING MACHINERY    hyoscyamine (LEVSIN/SL) 0.125 mg Subl Place 1 tablet (0.125 mg total) under the tongue every 6 (six) hours as needed (nausea and cramps).    [employer] COVID-19 VACCINE, EUA, 0.5 mL Susp     MAGNESIUM ORAL Take by mouth 2 (two) times daily.    sildenafil (VIAGRA) 50 MG tablet Take 1 tablet (50 mg total) by mouth daily as needed for Erectile Dysfunction.     Family History    None       Tobacco Use    Smoking status: Current Some Day Smoker     Types: Cigarettes    Smokeless tobacco: Never Used   Substance and Sexual Activity    Alcohol use: Yes     Comment: Daily Beer Drinker    Drug use: Never    Sexual activity: Yes     Review of Systems   Constitutional:  Negative for activity change, fever and unexpected weight change.   HENT:  Negative for trouble swallowing and voice change.    Eyes:  Negative for photophobia and visual disturbance.   Respiratory:  Negative for cough and shortness of breath.    Cardiovascular:  Negative for chest pain, palpitations and leg swelling.   Gastrointestinal:  Negative for abdominal distention, abdominal pain, blood in stool, constipation, diarrhea, nausea and vomiting.   Endocrine: Negative for polydipsia  and polyuria.   Genitourinary:  Negative for difficulty urinating, dysuria and hematuria.   Musculoskeletal:  Negative for neck pain and neck stiffness.   Skin:  Positive for wound. Negative for rash.   Allergic/Immunologic: Negative for immunocompromised state.   Neurological:  Negative for seizures, syncope, facial asymmetry and weakness.   Psychiatric/Behavioral:  Negative for agitation, behavioral problems and confusion.    Objective:     Vital Signs (Most Recent):  Temp: 97.9 °F (36.6 °C) (08/12/22 2214)  Pulse: 68 (08/12/22 2214)  Resp: 18 (08/12/22 2214)  BP: (!) 143/81 (08/12/22 2214)  SpO2: 99 % (08/12/22 2214)   Vital Signs (24h Range):  Temp:  [97.9 °F (36.6 °C)-98.9 °F (37.2 °C)] 97.9 °F (36.6 °C)  Pulse:  [50-77] 68  Resp:  [18-20] 18  SpO2:  [96 %-99 %] 99 %  BP: (131-143)/(65-82) 143/81     Weight: 76.6 kg (168 lb 14 oz)  Body mass index is 21.68 kg/m².    Physical Exam  Vitals and nursing note reviewed.   Constitutional:       General: He is not in acute distress.     Appearance: He is well-developed. He is not ill-appearing, toxic-appearing or diaphoretic.   HENT:      Head: Normocephalic and atraumatic.      Mouth/Throat:      Mouth: Mucous membranes are moist.      Pharynx: No oropharyngeal exudate.   Eyes:      General: No scleral icterus.     Pupils: Pupils are equal, round, and reactive to light.   Neck:      Thyroid: No thyromegaly.   Cardiovascular:      Rate and Rhythm: Normal rate and regular rhythm.      Heart sounds: No murmur heard.    No gallop.   Pulmonary:      Effort: Pulmonary effort is normal.      Breath sounds: Normal breath sounds. No stridor. No wheezing or rales.   Abdominal:      General: There is no distension.      Palpations: Abdomen is soft.      Tenderness: There is no abdominal tenderness. There is no guarding.   Musculoskeletal:         General: No swelling or deformity. Normal range of motion.      Cervical back: Normal range of motion. No rigidity.   Lymphadenopathy:       Cervical: No cervical adenopathy.   Skin:     General: Skin is warm.      Capillary Refill: Capillary refill takes less than 2 seconds.      Coloration: Skin is not jaundiced.      Findings: Lesion (ulcer on medial side of L ankle (see image)) present. No bruising.   Neurological:      General: No focal deficit present.      Mental Status: He is alert and oriented to person, place, and time.      Cranial Nerves: No cranial nerve deficit.   Psychiatric:         Mood and Affect: Mood normal.         Behavior: Behavior normal.         CRANIAL NERVES     CN III, IV, VI   Pupils are equal, round, and reactive to light.         Recent Results (from the past 24 hour(s))   POCT glucose    Collection Time: 08/12/22  5:41 PM   Result Value Ref Range    POCT Glucose 81 70 - 110 mg/dL   Sedimentation rate    Collection Time: 08/12/22  6:09 PM   Result Value Ref Range    Sed Rate 0 0 - 10 mm/Hr   C-reactive protein    Collection Time: 08/12/22  6:09 PM   Result Value Ref Range    CRP 2.5 0.0 - 8.2 mg/L   POCT CMP    Collection Time: 08/12/22  6:18 PM   Result Value Ref Range    Albumin, POC 4.0 3.3 - 5.5 g/dL    Alkaline Phosphatase, POC 62 42 - 141 U/L    ALT (SGPT), POC 27 10 - 47 U/L    AST (SGOT), POC 30 11 - 38 U/L    POC BUN 19 7 - 22 mg/dL    Calcium, POC 9.6 8.0 - 10.3 mg/dL    POC Chloride 105 98 - 108 mmol/L    POC Creatinine 0.8 0.6 - 1.2 mg/dL    POC Glucose 83 73 - 118 mg/dL    POC Potassium 4.4 3.6 - 5.1 mmol/L    POC Sodium 140 128 - 145 mmol/L    Bilirubin, POC 0.5 0.2 - 1.6 mg/dL    POC TCO2 25 18 - 33 mmol/L    Protein, POC 6.7 6.4 - 8.1 g/dL   POCT CBC    Collection Time: 08/12/22  6:20 PM   Result Value Ref Range    Hematocrit      Hemoglobin      RBC      WBC      MCV      MCH, POC      MCHC      RDW-CV      Platelet Count, POC      MPV     ISTAT PROCEDURE    Collection Time: 08/12/22  6:28 PM   Result Value Ref Range    POC PH 7.373 7.35 - 7.45    POC PCO2 41.6 35 - 45 mmHg    POC PO2 100 (HH) 40 - 60  mmHg    POC HCO3 24.2 24 - 28 mmol/L    POC BE -1 -2 to 2 mmol/L    POC SATURATED O2 98 95 - 100 %    POC Lactate 1.85 0.5 - 2.2 mmol/L    POC TCO2 25 24 - 29 mmol/L    Sample VENOUS     Site Other     Allens Test N/A    POCT URINALYSIS W/O SCOPE    Collection Time: 08/12/22  6:30 PM   Result Value Ref Range    Glucose, UA Negative     Bilirubin, UA Negative     Ketones, UA Negative     Spec Grav UA 1.010     Blood, UA Negative     PH, UA 5.5     Protein, UA Negative     Urobilinogen, UA 0.2 E.U./dL    Nitrite, UA Negative     Leukocytes, UA Negative     Color, UA Yellow     Clarity, UA Clear    POCT COVID-19 Rapid Screening    Collection Time: 08/12/22  6:43 PM   Result Value Ref Range    POC Rapid COVID Negative Negative     Acceptable Yes        Microbiology Results (last 7 days)       Procedure Component Value Units Date/Time    Blood Culture #1 **CANNOT BE ORDERED STAT** [[id number]] Collected: 08/12/22 1816    Order Status: Sent Specimen: Blood from Peripheral, Hand, Right Updated: 08/12/22 1816    Blood Culture #2 **CANNOT BE ORDERED STAT** [[id number]] Collected: 08/12/22 1812    Order Status: Sent Specimen: Blood from Peripheral, Antecubital, Right Updated: 08/12/22 1813             Imaging Results              X-Ray Ankle Complete Left (Final result)  Result time 08/12/22 17:33:57      Final result by Madison Hager MD (08/12/22 17:33:57)                   Impression:      No acute osseous abnormality identified.      Electronically signed by: Madison Haegr MD  Date:    08/12/2022  Time:    17:33               Narrative:    EXAMINATION:  XR ANKLE COMPLETE 3 VIEW LEFT    CLINICAL HISTORY:  Other injury of unspecified body region, initial encounter    TECHNIQUE:  AP, lateral and oblique views of the left ankle were performed.    COMPARISON:  08/11/2022.    FINDINGS:  No evidence of acute displaced fracture, dislocation, or osseous destructive process.  Ankle mortise is maintained.  Soft  tissue injury is seen at the posteromedial aspect of the ankle.

## 2022-08-13 NOTE — DISCHARGE SUMMARY
[facility] Medicine  Discharge Summary      Patient Name: Darren Nicolas  MRN: [id number]  Patient Class: IP- Inpatient  Admission Date: 8/12/2022  Hospital Length of Stay: 1 days  Discharge Date and Time:  08/13/2022 3:34 PM  Attending Physician: Kurtis Mcgregor III, MD   Discharging Provider: Kurtis Mcgregor III, MD  Primary Care Provider: ELSI Esparza      HPI:     Darren Nicolas is a 42 y.o. male who has a past medical history of Seizure, presented to the ED with a Chronic Wound of Left Ankle.  Patient seen by physician and sent in to urgent care worsening of chronic wound of his ankle, left medial.  Patient had an injury 18 months ago, for which he wore a boot for 7-8 months, but a year ago he started to have significant breakdown of his left ankle and ulcer resulted.  He has been receiving wound care, and for about 6 months it had improved, and thought it was going to repair, however worsened and 1 week ago he accidentally hit/injured again.  Patient states that there is some drainage present now and he went to urgent care because of this, where he was directed to the ED.  He does not have a pain associated with it, but it is aggravated when walking.  He was given a dose of vancomycin and Zosyn, prior from his departure from [facility].  Denies fever, CP, or SOB.      * No surgery found *      Hospital Course:   43 y/o male with a past medical history of Seizure, presented to the ED with a Chronic Wound of Left Ankle.  Patient seen by physician and sent in to urgent care worsening of chronic wound of his ankle, left medial.  Patient had an injury 18 months ago, for which he wore a boot for 7-8 months, but a year ago he started to have significant breakdown of his left ankle and ulcer resulted.  He has been receiving wound care, and for about 6 months it had improved, and thought it was going to repair, however worsened and 1 week ago he accidentally hit/injured again.  Patient  states that there is some drainage present now and he went to urgent care because of this, where he was directed to the ED.  He does not have a pain associated with it, but it is aggravated when walking.  He was given a dose of vancomycin and Zosyn, prior from his departure from [facility]. Pt was hospitalized for mri evaluation of the wound. MRI with  Posteromedial wound skin suspect.  No evidence for osteomyelitis.  No evidence for abscess or focal fluid collection. Suspect fibrosis Kager's fat pad at site of previous Arlyn noted heterogeneous edema/fluid collection on MRI of 07/23/2021. Podiatry evaluated the patient recommend further outpatient podiatry follow up, but no indication for immediate intervention. Pt was d/c home on 8/13 to start back on her home medications. Pt recommended to f/u with his neurologist as it has been almost a year since he was last seen.       Goals of Care Treatment Preferences:  Code Status: Full Code      Consults:   Consults (From admission, onward)        Status Ordering Provider     Inpatient consult to Podiatry  Once        Provider:  Leodan Osuna DPM    Acknowledged PRASANNA MORALES A          Seizure disorder        Venous insufficiency of both lower extremities  Noted        Final Active Diagnoses:    Diagnosis Date Noted POA    PRINCIPAL PROBLEM:  Wound infection [T14.8XXA, L08.9] 08/13/2022 Yes    Seizure disorder [G40.909] 08/13/2022 Unknown    Venous insufficiency of both lower extremities [I87.2] 10/20/2021 Yes      Problems Resolved During this Admission:       Discharged Condition: fair    Disposition: Home or Self Care    Follow Up:   Follow-up Information     Leodan Osuna DPM Follow up in 1 week(s).    Specialty: Podiatry  Contact information:  [address]  [place] [address]  [phone]             ELSI Esparza Follow up in 3 day(s).    Specialty: Internal Medicine  Contact information:  [address] [facility]  [place] [address]  [phone]             Felipe Egan MD Follow up in 1 week(s).    Specialty: Neurology  Contact information:  [place] [address] [phone]                       Patient Instructions:      Diet Adult Regular     Activity as tolerated       Significant Diagnostic Studies: Labs: All labs within the past 24 hours have been reviewed    Pending Diagnostic Studies:     Procedure Component Value Units Date/Time    Lamotrigine level [[id number]] Collected: 08/13/22 0417    Order Status: Sent Lab Status: In process Updated: 08/13/22 0530    Specimen: Blood     Levetiracetam level [[id number]] Collected: 08/13/22 0417    Order Status: Sent Lab Status: In process Updated: 08/13/22 0530    Specimen: Blood          Medications:  Reconciled Home Medications:      Medication List      CONTINUE taking these medications    clobetasol 0.05% 0.05 % Oint  Commonly known as: TEMOVATE  AAA hands and feet bid     hydrOXYzine HCL 25 MG tablet  Commonly known as: ATARAX  TAKE 1 TABLET BY MOUTH THREE TIMES A DAY AS NEEDED FOR ITCHING. NO DRIVING/OPERATING MACHINERY     hyoscyamine 0.125 mg Subl  Commonly known as: LEVSIN/SL  Place 1 tablet (0.125 mg total) under the tongue every 6 (six) hours as needed (nausea and cramps).     Door 6 COVID-19 VACCINE (EUA) 0.5 mL Susp  Generic drug: COVID-19 vac,Ad26(twtrland)-PF     lamoTRIgine 200 MG tablet  Commonly known as: LAMICTAL  TAKE 1 TABLET (200 MG TOTAL) BY MOUTH ONCE DAILY AND 3 TABLETS (600 MG TOTAL) EVERY EVENING.     levETIRAcetam 750 MG Tab  Commonly known as: KEPPRA  Take 2 tablets (1,500 mg total) by mouth 2 (two) times daily. NO FURTHER REFILL WITHOUT APPT     MAGNESIUM ORAL  Take by mouth 2 (two) times daily.     sildenafiL 50 MG tablet  Commonly known as: VIAGRA  Take 1 tablet (50 mg total) by mouth daily as needed for Erectile Dysfunction.            Indwelling Lines/Drains at time of discharge:   Lines/Drains/Airways     None                 Time  spent on the discharge of patient: >35 minutes         Kurtis Mcgregor III, MD  Department of Hospital Medicine  [facility]

## 2022-08-13 NOTE — HPI
42 year old amel with hx of seizures, tobacco, and Etoh presented to ED for check of chronic left ankle wound x 1 year on the instruction of urgent care. Patient was admitted to [facility] for wound infection. He notes he accidentally kicked himself about a week ago and this made the wound worse. Patient relays the wound will not heal with the waterproof bandages he has been using, and he is not sure if the wound should be left open to air. He denies any pain/F/C/N/V. Podiatry was consulted for wound of left ankle.     At the time of initial encounter with the patient, VSS, afebrile, WBC 7.31, ESR 0, and CRP 2.5. Ankle X Rays taken at urgent read posterior pocket of soft tissue gas near the wound, near the proximal aspect of of kanger's triangle. Ankle X rays taken at ED did not show a definitive pocket of gas. MRI of the L ankle did not show any concern for soft tissue gas, fluid collection, or bony erosion.

## 2022-08-13 NOTE — ASSESSMENT & PLAN NOTE
Per pt his last seizure was about 1 week ago, pt has not followed up with his neurologist in almost a year.   Encouraged pt to reach out to neurologist for an appointment    -continue home medications

## 2022-08-13 NOTE — SUBJECTIVE & OBJECTIVE
Interval History: Pt states he is feeling fine just a little tired. Pt is ready to go for mri for evaluation of the wound. All questions answered at the bedside. Pt denies cp, sob, fever, chills, n/v, abd pain, ha, or blurry vision.    Review of Systems  Objective:     Vital Signs (Most Recent):  Temp: 98.1 °F (36.7 °C) (08/13/22 0809)  Pulse: 67 (08/13/22 0809)  Resp: 18 (08/13/22 0809)  BP: (!) 121/58 (08/13/22 0809)  SpO2: 98 % (08/13/22 0809)   Vital Signs (24h Range):  Temp:  [97.8 °F (36.6 °C)-98.9 °F (37.2 °C)] 98.1 °F (36.7 °C)  Pulse:  [50-77] 67  Resp:  [18-20] 18  SpO2:  [96 %-99 %] 98 %  BP: (121-143)/(58-82) 121/58     Weight: 76.6 kg (168 lb 14 oz)  Body mass index is 21.68 kg/m².    Intake/Output Summary (Last 24 hours) at 8/13/2022 1046  Last data filed at 8/12/2022 2220  Gross per 24 hour   Intake 1337.28 ml   Output 1 ml   Net 1336.28 ml      Physical Exam  Constitutional:       General: He is not in acute distress.     Appearance: Normal appearance. He is not ill-appearing.   HENT:      Head: Normocephalic and atraumatic.      Mouth/Throat:      Mouth: Mucous membranes are moist.   Eyes:      General: No scleral icterus.     Extraocular Movements: Extraocular movements intact.   Pulmonary:      Effort: Pulmonary effort is normal. No respiratory distress.   Abdominal:      General: There is no distension.      Palpations: Abdomen is soft.   Musculoskeletal:         General: No swelling.      Cervical back: Neck supple.      Comments: Findings: Lesion (ulcer on medial side of L ankle with obvious active drainage at this time   Skin:     General: Skin is warm and dry.   Neurological:      General: No focal deficit present.      Mental Status: He is alert and oriented to person, place, and time.   Psychiatric:         Mood and Affect: Mood normal.         Behavior: Behavior normal.       Significant Labs: All pertinent labs within the past 24 hours have been reviewed.    Significant Imaging: I have  reviewed all pertinent imaging results/findings within the past 24 hours.

## 2022-08-13 NOTE — ASSESSMENT & PLAN NOTE
· Ulcer on left medial ankle  · Chronic, however with acute changes concern for acute infection  · V +Z given in ED, will hold antibiotics for better in of the yield in case biopsy needed   · MRI ankle ordered  · Podiatry consult  · NPO at midnight in case biopsy

## 2022-08-13 NOTE — DISCHARGE INSTRUCTIONS
Your MRI was reassuring that there was no infection in the bone from your wound    You will need to follow up with podiatry for further management of your wound.    Follow up with your primary care doctor next week to discuss hospitalization.    WRITTEN HEALTHCARE DISCHARGE INFORMATION      Things that YOU are responsible for to Manage Your Care At Home:  1. Getting your prescriptions filled.  2. Taking you medications as directed. DO NOT MISS ANY DOSES!  3. Going to your follow-up doctor appointments. This is important because it allows the doctor to monitor your progress and to determine if any changes need to be made to your treatment plan.     If you are unable to make your follow up appointments, please call the number listed and reschedule this appointment.      After discharge, if you need assistance, you can call [facility] On Call Nurse Care Line for 24/7 assistance at [phone]     If you are experience any signs or symptoms, Call your doctor or Call 911 and come to your nearest Emergency Room.     Thank [facility] and allowing us to care for you.        You should receive a call from [facility] Discharge Department within 48-72 hours to help manage your care after discharge. Please try to make sure that you answer your phone for this important phone call.       Follow-up Information       Leodan Osuna DPM Follow up in 1 week(s).    Specialty: Podiatry  Why: Please call office on Monday to arrange a, HOSPITAL DISCHARGE FOLLOW UP VISIT, in 1 week  Contact information:  [address]  [facility] [address]  [phone]               ELSI Esparza Follow up in 3 day(s).    Specialty: Internal Medicine  Why: Please call office on Monday to arrange a, HOSPITAL DISCHARGE FOLLOW UP VISIT in 3 days  Contact information:  [address] [facility] [address]  [phone]               Felipe Egan MD Follow up in 1 week(s).    Specialty:  Neurology  Why: Please call office on Monday to arrange a, HOSPITAL DISCHARGE FOLLOW UP VISIT, in 1-2 weeks  Contact information:  [address] [place] [address]  [phone]               Johnathon Farmer MD Follow up in 2 week(s).    Specialty: Orthopedic Surgery  Why: Please call office on Monday to arrange a, HOSPITAL DISCHARGE FOLLOW UP VISIT, in 2 weeks  Contact information:  [address]  [facility]  [place] [address]  [phone]

## 2022-08-13 NOTE — PLAN OF CARE
[facility]  Initial Discharge Assessment       Primary Care Provider: ELSI Esparza    Admission Diagnosis: Wound infection [T14.8XXA, L08.9]  Cellulitis of left lower extremity [L03.116]  Chest pain [R07.9]  Osteomyelitis of left ankle, unspecified type [M86.9]    Admission Date: 8/12/2022  Expected Discharge Date: 8/13/2022    Discharge Barriers Identified: None    Payor: HUMANA / Plan: HUMANA  PPO / Product Type: PPO /     Extended Emergency Contact Information  Primary Emergency Contact: GERMANIA HAGER  Mobile Phone: [phone]  Relation: Significant other   needed? No  Secondary Emergency Contact: Brittani Nicolas  Address: unknown   [facility]  Home Phone: [phone]  Relation: Mother    Discharge Plan A: Home         [id number] - Cl[facility] - [place], [place] - [address]  [address] [facility] [address]  Phone: [phone] Fax: [phone]    [id number] - [place], [place] - [address]  [address]  [place] [address]  Phone: [phone] Fax: [phone]    [id number] - [place], [place] - [address]  [address]  [place] [address]  Phone: [phone] Fax: [phone]    [id number] - [place], [place] - [address]  [address]  [address]  [facility] [address]  Phone: [phone] Fax: [phone]      Initial Assessment (most recent)     Adult Discharge Assessment - 08/13/22 1100        Discharge Assessment    Assessment Type Discharge Planning Assessment     Confirmed/corrected address, phone number and insurance Yes     Confirmed Demographics Correct on Facesheet     Source of Information patient     Communicated EUGENE with patient/caregiver Date not available/Unable to determine     Reason For Admission Wound infection     Lives With significant other     Do you expect to return to your current living situation? Yes     Prior to hospitilization cognitive status: Alert/Oriented     Current  cognitive status: Alert/Oriented     Walking or Climbing Stairs Difficulty ambulation difficulty, requires equipment     Dressing/Bathing Difficulty none     Equipment Currently Used at Home none     Readmission within 30 days? No     Patient currently being followed by outpatient case management? No     Do you take prescription medications? Yes     Do you have prescription coverage? Yes     Coverage [employer]     Do you have any problems affording any of your prescribed medications? No     Is the patient taking medications as prescribed? yes     How do you get to doctors appointments? car, drives self     Are you on dialysis? No     Discharge Plan A Home     DME Needed Upon Discharge  none     Discharge Plan discussed with: Patient     Discharge Barriers Identified None

## 2022-08-13 NOTE — HOSPITAL COURSE
41 y/o male with a past medical history of Seizure, presented to the ED with a Chronic Wound of Left Ankle.  Patient seen by physician and sent in to urgent care worsening of chronic wound of his ankle, left medial.  Patient had an injury 18 months ago, for which he wore a boot for 7-8 months, but a year ago he started to have significant breakdown of his left ankle and ulcer resulted.  He has been receiving wound care, and for about 6 months it had improved, and thought it was going to repair, however worsened and 1 week ago he accidentally hit/injured again.  Patient states that there is some drainage present now and he went to urgent care because of this, where he was directed to the ED.  He does not have a pain associated with it, but it is aggravated when walking.  He was given a dose of vancomycin and Zosyn, prior from his departure from [facility]. Pt was hospitalized for mri evaluation of the wound. MRI with  Posteromedial wound skin suspect.  No evidence for osteomyelitis.  No evidence for abscess or focal fluid collection. Suspect fibrosis Kager's fat pad at site of previous Arlyn noted heterogeneous edema/fluid collection on MRI of 07/23/2021. Podiatry evaluated the patient recommend further outpatient podiatry follow up, but no indication for immediate intervention. Pt was d/c home on 8/13 to start back on her home medications. Pt recommended to f/u with his neurologist as it has been almost a year since he was last seen.

## 2022-08-13 NOTE — PLAN OF CARE
WRITTEN HEALTHCARE DISCHARGE INFORMATION      Things that YOU are responsible for to Manage Your Care At Home:  1. Getting your prescriptions filled.  2. Taking you medications as directed. DO NOT MISS ANY DOSES!  3. Going to your follow-up doctor appointments. This is important because it allows the doctor to monitor your progress and to determine if any changes need to be made to your treatment plan.     If you are unable to make your follow up appointments, please call the number listed and reschedule this appointment.      After discharge, if you need assistance, you can call [facility] On Call Nurse Care Line for 24/7 assistance at [phone]     If you are experience any signs or symptoms, Call your doctor or Call 911 and come to your nearest Emergency Room.     Thank you for choosing [facility] and allowing us to care for you.        You should receive a call from [facility] Discharge Department within 48-72 hours to help manage your care after discharge. Please try to make sure that you answer your phone for this important phone call.      Follow-up Information     Leodan Osuna DPM Follow up in 1 week(s).    Specialty: Podiatry  Why: Please call office on Monday to arrange a, HOSPITAL DISCHARGE FOLLOW UP VISIT, in 1 week  Contact information:  [address] [facility] [address]  [phone]             ELSI Esparza Follow up in 3 day(s).    Specialty: Internal Medicine  Why: Please call office on Monday to arrange a, HOSPITAL DISCHARGE FOLLOW UP VISIT in 3 days  Contact information:  [address] [facility] [address]  [phone]             Felipe Egan MD Follow up in 1 week(s).    Specialty: Neurology  Why: Please call office on Monday to arrange a, HOSPITAL DISCHARGE FOLLOW UP VISIT, in 1-2 weeks  Contact information:  [address] [place] [facility] [address]  [phone]             Johnathon Farmer MD Follow up in 2 week(s).    Specialty: Orthopedic  Surgery  Why: Please call office on Monday to arrange a, HOSPITAL DISCHARGE FOLLOW UP VISIT, in 2 weeks  Contact information:  [address]  [address]  [place] [address]  [phone]

## 2022-08-13 NOTE — PROGRESS NOTES
[facility] Medicine  Progress Note    Patient Name: Darren Nicolas  MRN: [id number]  Patient Class: IP- Inpatient   Admission Date: 8/12/2022  Length of Stay: 1 days  Attending Physician: Kurtis Mcgregor III, MD  Primary Care Provider: ELSI Esparza        Subjective:     Principal Problem:Wound infection        HPI:    Darren Nicolas is a 42 y.o. male who has a past medical history of Seizure, presented to the ED with a Chronic Wound of Left Ankle.  Patient seen by physician and sent in to urgent care worsening of chronic wound of his ankle, left medial.  Patient had an injury 18 months ago, for which he wore a boot for 7-8 months, but a year ago he started to have significant breakdown of his left ankle and ulcer resulted.  He has been receiving wound care, and for about 6 months it had improved, and thought it was going to repair, however worsened and 1 week ago he accidentally hit/injured again.  Patient states that there is some drainage present now and he went to urgent care because of this, where he was directed to the ED.  He does not have a pain associated with it, but it is aggravated when walking.  He was given a dose of vancomycin and Zosyn, prior from his departure from [facility].  Denies fever, CP, or SOB.      Overview/Hospital Course:  No notes on file    Interval History: Pt states he is feeling fine just a little tired. Pt is ready to go for mri for evaluation of the wound. All questions answered at the bedside. Pt denies cp, sob, fever, chills, n/v, abd pain, ha, or blurry vision.    Review of Systems  Objective:     Vital Signs (Most Recent):  Temp: 98.1 °F (36.7 °C) (08/13/22 0809)  Pulse: 67 (08/13/22 0809)  Resp: 18 (08/13/22 0809)  BP: (!) 121/58 (08/13/22 0809)  SpO2: 98 % (08/13/22 0809)   Vital Signs (24h Range):  Temp:  [97.8 °F (36.6 °C)-98.9 °F (37.2 °C)] 98.1 °F (36.7 °C)  Pulse:  [50-77] 67  Resp:  [18-20] 18  SpO2:  [96 %-99 %] 98 %  BP:  (121-143)/(58-82) 121/58     Weight: 76.6 kg (168 lb 14 oz)  Body mass index is 21.68 kg/m².    Intake/Output Summary (Last 24 hours) at 8/13/2022 1046  Last data filed at 8/12/2022 2220  Gross per 24 hour   Intake 1337.28 ml   Output 1 ml   Net 1336.28 ml      Physical Exam  Constitutional:       General: He is not in acute distress.     Appearance: Normal appearance. He is not ill-appearing.   HENT:      Head: Normocephalic and atraumatic.      Mouth/Throat:      Mouth: Mucous membranes are moist.   Eyes:      General: No scleral icterus.     Extraocular Movements: Extraocular movements intact.   Pulmonary:      Effort: Pulmonary effort is normal. No respiratory distress.   Abdominal:      General: There is no distension.      Palpations: Abdomen is soft.   Musculoskeletal:         General: No swelling.      Cervical back: Neck supple.      Comments: Findings: Lesion (ulcer on medial side of L ankle with obvious active drainage at this time   Skin:     General: Skin is warm and dry.   Neurological:      General: No focal deficit present.      Mental Status: He is alert and oriented to person, place, and time.   Psychiatric:         Mood and Affect: Mood normal.         Behavior: Behavior normal.       Significant Labs: All pertinent labs within the past 24 hours have been reviewed.    Significant Imaging: I have reviewed all pertinent imaging results/findings within the past 24 hours.      Assessment/Plan:      * Wound infection  · Ulcer on left medial ankle, appears likely to be a venous insufficiency wound  · Chronic, however with acute changes concern for acute infection  · V +Z given in ED, will hold antibiotics for better in of the yield in case biopsy needed   · MRI ankle ordered  · Podiatry consult  · NPO at midnight in case biopsy  · RCRI of 0      Seizure disorder  Per pt his last seizure was about 1 week ago, pt has not followed up with his neurologist in almost a year.   Encouraged pt to reach out to  neurologist for an appointment    -continue home medications      Venous insufficiency of both lower extremities  Noted        VTE Risk Mitigation (From admission, onward)         Ordered     IP VTE HIGH RISK PATIENT  Once         08/12/22 2212     Place sequential compression device  Until discontinued         08/12/22 2212                Discharge Planning   EUGENE:      Code Status: Full Code   Is the patient medically ready for discharge?:     Reason for patient still in hospital (select all that apply): Treatment                     Kurtis Mcgregor III, MD  Department of [facility] Medicine   [facility]

## 2022-08-13 NOTE — SUBJECTIVE & OBJECTIVE
Scheduled Meds:   enoxaparin  40 mg Subcutaneous Daily    lamoTRIgine  200 mg Oral Daily    lamoTRIgine  600 mg Oral QHS    levETIRAcetam  1,500 mg Oral BID    melatonin  6 mg Oral Nightly     Continuous Infusions:  PRN Meds:acetaminophen, glucagon (human recombinant), glucose, glucose, naloxone, ondansetron, polyethylene glycol    Review of patient's allergies indicates:  No Known Allergies     Past Medical History:   Diagnosis Date    Seizure      History reviewed. No pertinent surgical history.    Family History    None       Tobacco Use    Smoking status: Current Some Day Smoker     Types: Cigarettes    Smokeless tobacco: Never Used   Substance and Sexual Activity    Alcohol use: Yes     Comment: Daily Beer Drinker    Drug use: Never    Sexual activity: Yes     Review of Systems   Constitutional:  Negative for activity change, appetite change, chills, diaphoresis, fatigue, fever and unexpected weight change.   HENT:  Negative for hearing loss.    Eyes:  Negative for visual disturbance.   Cardiovascular:  Negative for chest pain and leg swelling.   Gastrointestinal:  Negative for abdominal pain, constipation, diarrhea, nausea and vomiting.   Musculoskeletal:  Negative for arthralgias and joint swelling.   Skin:  Positive for wound (Superficial partial thickness at medial left leg). Negative for color change.   Neurological:  Negative for weakness and numbness.   Psychiatric/Behavioral:  Negative for agitation and behavioral problems.    Objective:     Vital Signs (Most Recent):  Temp: 98.4 °F (36.9 °C) (08/13/22 1218)  Pulse: 68 (08/13/22 1218)  Resp: 18 (08/13/22 1218)  BP: 117/62 (08/13/22 1218)  SpO2: 98 % (08/13/22 1218) Vital Signs (24h Range):  Temp:  [97.8 °F (36.6 °C)-98.4 °F (36.9 °C)] 98.4 °F (36.9 °C)  Pulse:  [50-68] 68  Resp:  [18-20] 18  SpO2:  [96 %-99 %] 98 %  BP: (117-143)/(58-82) 117/62     Weight: 76.6 kg (168 lb 14 oz)  Body mass index is 21.68 kg/m².    Foot Exam    General  General  Appearance: appears stated age and healthy   Orientation: alert and oriented to person, place, and time       Right Foot/Ankle     Inspection and Palpation  Ecchymosis: none  Tenderness: none   Swelling: none   Skin Exam: skin intact; no cellulitis, no ulcer and no erythema     Neurovascular  Dorsalis pedis: 2+  Posterior tibial: 2+  Saphenous nerve sensation: normal  Tibial nerve sensation: normal  Superficial peroneal nerve sensation: normal  Deep peroneal nerve sensation: normal  Sural nerve sensation: normal    Comments  Winchester type distribution of xerotic skin   Dermatitis with punctate like lesions, similar appearance to porokeratosis.       Left Foot/Ankle      Inspection and Palpation  Skin Exam: dry skin and tinea; no callus, skin not intact, no cellulitis, no ulcer and no erythema     Neurovascular  Dorsalis pedis: 2+  Posterior tibial: 2+  Saphenous nerve sensation: normal  Tibial nerve sensation: normal  Superficial peroneal nerve sensation: normal  Deep peroneal nerve sensation: normal  Sural nerve sensation: normal    Comments  Dry stable partial thickness superficial medial leg wound. No erythema, No warmth, No edema, No Pain, No drainage, No fluctuance or crepitation, No Malodor      Winchester type distribution of xerotic skin   Dermatitis with punctate like lesions, similar appearance to porokeratosis.     Laboratory:  CBC:   Recent Labs   Lab 08/13/22  0417   WBC 7.31   RBC 4.07*   HGB 13.5*   HCT 39.9*      MCV 98   MCH 33.2*   MCHC 33.8     CMP:   Recent Labs   Lab 08/13/22  0417   GLU 92   CALCIUM 8.9   ALBUMIN 3.5   PROT 6.0      K 4.4   CO2 23   *   BUN 15   CREATININE 0.7   ALKPHOS 54*   ALT 15   AST 17   BILITOT 0.4     CRP:   Recent Labs   Lab 08/12/22  1809   CRP 2.5     ESR:   Recent Labs   Lab 08/12/22  1809   SEDRATE 0       Diagnostic Results:  I have reviewed all pertinent imaging results/findings within the past 24 hours.  See HPI, reviewed both L ankle rays and L  ankle MRI.

## 2022-08-17 LAB
LAMOTRIGINE SERPL-MCNC: 11.8 UG/ML (ref 2–15)
LEVETIRACETAM SERPL-MCNC: 42.3 UG/ML (ref 3–60)

## 2022-08-24 ENCOUNTER — TELEPHONE (OUTPATIENT)
Dept: INTERNAL MEDICINE | Facility: CLINIC | Age: 42
End: 2022-08-24
Payer: COMMERCIAL

## 2022-08-24 ENCOUNTER — OFFICE VISIT (OUTPATIENT)
Dept: INTERNAL MEDICINE | Facility: CLINIC | Age: 42
End: 2022-08-24
Attending: FAMILY MEDICINE
Payer: COMMERCIAL

## 2022-08-24 VITALS
OXYGEN SATURATION: 96 % | BODY MASS INDEX: 21.53 KG/M2 | DIASTOLIC BLOOD PRESSURE: 70 MMHG | HEIGHT: 74 IN | SYSTOLIC BLOOD PRESSURE: 132 MMHG | HEART RATE: 72 BPM | WEIGHT: 167.75 LBS

## 2022-08-24 DIAGNOSIS — L89.522 PRESSURE INJURY OF LEFT ANKLE, STAGE 2: Primary | ICD-10-CM

## 2022-08-24 DIAGNOSIS — G40.219 PARTIAL SYMPTOMATIC EPILEPSY WITH COMPLEX PARTIAL SEIZURES, INTRACTABLE, WITHOUT STATUS EPILEPTICUS: ICD-10-CM

## 2022-08-24 PROCEDURE — 99999 PR PBB SHADOW E&M-EST. PATIENT-LVL IV: ICD-10-PCS | Mod: PBBFAC,,, | Performed by: FAMILY MEDICINE

## 2022-08-24 PROCEDURE — 99214 OFFICE O/P EST MOD 30 MIN: CPT | Mod: S$GLB,,, | Performed by: FAMILY MEDICINE

## 2022-08-24 PROCEDURE — 1160F RVW MEDS BY RX/DR IN RCRD: CPT | Mod: CPTII,S$GLB,, | Performed by: FAMILY MEDICINE

## 2022-08-24 PROCEDURE — 1159F PR MEDICATION LIST DOCUMENTED IN MEDICAL RECORD: ICD-10-PCS | Mod: CPTII,S$GLB,, | Performed by: FAMILY MEDICINE

## 2022-08-24 PROCEDURE — 3008F PR BODY MASS INDEX (BMI) DOCUMENTED: ICD-10-PCS | Mod: CPTII,S$GLB,, | Performed by: FAMILY MEDICINE

## 2022-08-24 PROCEDURE — 3008F BODY MASS INDEX DOCD: CPT | Mod: CPTII,S$GLB,, | Performed by: FAMILY MEDICINE

## 2022-08-24 PROCEDURE — 3078F DIAST BP <80 MM HG: CPT | Mod: CPTII,S$GLB,, | Performed by: FAMILY MEDICINE

## 2022-08-24 PROCEDURE — 99214 PR OFFICE/OUTPT VISIT, EST, LEVL IV, 30-39 MIN: ICD-10-PCS | Mod: S$GLB,,, | Performed by: FAMILY MEDICINE

## 2022-08-24 PROCEDURE — 99999 PR PBB SHADOW E&M-EST. PATIENT-LVL IV: CPT | Mod: PBBFAC,,, | Performed by: FAMILY MEDICINE

## 2022-08-24 PROCEDURE — 3078F PR MOST RECENT DIASTOLIC BLOOD PRESSURE < 80 MM HG: ICD-10-PCS | Mod: CPTII,S$GLB,, | Performed by: FAMILY MEDICINE

## 2022-08-24 PROCEDURE — 3075F PR MOST RECENT SYSTOLIC BLOOD PRESS GE 130-139MM HG: ICD-10-PCS | Mod: CPTII,S$GLB,, | Performed by: FAMILY MEDICINE

## 2022-08-24 PROCEDURE — 1111F DSCHRG MED/CURRENT MED MERGE: CPT | Mod: CPTII,S$GLB,, | Performed by: FAMILY MEDICINE

## 2022-08-24 PROCEDURE — 1111F PR DISCHARGE MEDS RECONCILED W/ CURRENT OUTPATIENT MED LIST: ICD-10-PCS | Mod: CPTII,S$GLB,, | Performed by: FAMILY MEDICINE

## 2022-08-24 PROCEDURE — 1159F MED LIST DOCD IN RCRD: CPT | Mod: CPTII,S$GLB,, | Performed by: FAMILY MEDICINE

## 2022-08-24 PROCEDURE — 3075F SYST BP GE 130 - 139MM HG: CPT | Mod: CPTII,S$GLB,, | Performed by: FAMILY MEDICINE

## 2022-08-24 PROCEDURE — 1160F PR REVIEW ALL MEDS BY PRESCRIBER/CLIN PHARMACIST DOCUMENTED: ICD-10-PCS | Mod: CPTII,S$GLB,, | Performed by: FAMILY MEDICINE

## 2022-08-24 NOTE — PROGRESS NOTES
"CHIEF COMPLAINT:  Skin lesions    HISTORY OF PRESENT ILLNESS: The patient is a generally healthy 42 year-old.  The patient has multiple areas of dermatitis.  The most concerning is a Left medial ankle open wound present for months.  Recent OBS visit with MRI.    REVIEW OF SYSTEMS:  GENERAL: No fever, chills, fatigability or weight loss.  SKIN:  See above.  HEAD: No headaches or recent head trauma.  EYES: Visual acuity fine. No photophobia, ocular pain or diplopia.  EARS: Denies ear pain, discharge or vertigo.  NOSE: No loss of smell, no epistaxis or postnasal drip.  MOUTH & THROAT: No hoarseness or change in voice. No excessive gum bleeding.  NODES: Denies swollen glands.  CHEST: Denies ESPA[place], cyanosis, wheezing, cough and sputum production.  CARDIOVASCULAR: Denies chest pain, PND, orthopnea or reduced exercise tolerance.  ABDOMEN: Appetite fine. No weight loss. Denies diarrhea, abdominal pain, hematemesis or blood in stool.  URINARY: No flank pain, dysuria or hematuria.  PERIPHERAL VASCULAR: No claudication or cyanosis.  MUSCULOSKELETAL: No joint stiffness or swelling. Denies back pain.  NEUROLOGIC: No history of seizures, paralysis, alteration of gait or coordination.    SOCIAL HISTORY: The patient does not smoke.  The patient consumes alcohol socially.  The patient is single.    PHYSICAL EXAMINATION:   Blood pressure 132/70, pulse 72, height 6' 2" (1.88 m), weight 76.1 kg (167 lb 12.3 oz), SpO2 96 %.  APPEARANCE: Well nourished, well developed, in no acute distress.    HEAD: Normocephalic, atraumatic.  EYES: PERRL. EOMI.  Conjunctivae without injection and  anicteric  NOSE: Mucosa pink. Airway clear.  MOUTH & THROAT: No tonsillar enlargement. No pharyngeal erythema or exudate. No stridor.  NECK: Supple.   NODES: No cervical, axillary or inguinal lymph node enlargement.  CHEST: Lungs clear to auscultation.  No retractions are noted.  No rales or rhonchi are present.  CARDIOVASCULAR: Normal S1, S2. No rubs, murmurs " or gallops.  ABDOMEN: Bowel sounds normal. Not distended. Soft. No tenderness or masses.  No ascites is noted.  MUSCULOSKELETAL:  There is no clubbing, cyanosis, or edema of the extremities x4.  There is full range of motion of the lumbar spine.  There is full range of motion of the extremities x4.  There is no deformity noted.  Left medial ankle open wound for months  NEUROLOGIC:       Normal speech development.      Hearing normal.      Normal gait.      Motor and sensory exams grossly normal.  PSYCHIATRIC: Patient is alert and oriented x3.  Thought processes are all normal.  There is no homicidality.  There is no suicidality.  There is no evidence of psychosis.    LABORATORY/RADIOLOGY:   Chart reviewed.      ASSESSMENT:   Left medial ankle open wound for months  Multiple areas of dermatitis  Seizure disorder  Recent OBS visit    PLAN:  Follow up derm this week      Answers for HPI/ROS submitted by the patient on 8/23/2022  Chronicity: recurrent  Onset: more than 1 year ago  Progression since onset: waxing and waning  Affected locations: left hand, left wrist, left fingers, left leg, left ankle, left foot, left toes, right arm, right hand, right wrist, right fingers, right ankle, right toes  Characteristics: dryness, pain, draining, itchiness, peeling  Exposed to: nothing  anorexia: No  congestion: No  cough: No  diarrhea: No  eye pain: No  facial edema: No  fatigue: No  fever: No  joint pain: No  nail changes: No  rhinorrhea: No  shortness of breath: No  sore throat: No  vomiting: No  Treatments tried: anti-itch cream, antibiotic cream, antibiotics, moisturizer, topical steroids  Improvement on treatment: mild  asthma: Yes  allergies: Yes  eczema: No  varicella: Yes

## 2022-08-24 NOTE — TELEPHONE ENCOUNTER
----- Message from Joan Mobley sent at 8/24/2022  7:57 AM CDT -----  Regarding: Late  Who is Calling: LISA GILLESPIE [[id number]]           What is the request in detail: Patient is running 15 minutes late.  If patient needs to reschedule, please contact.            Can the clinic reply by [facility]: NO           What Number to Call Back if not in [facility]: [phone]

## 2022-10-26 DIAGNOSIS — G40.219 PARTIAL SYMPTOMATIC EPILEPSY WITH COMPLEX PARTIAL SEIZURES, INTRACTABLE, WITHOUT STATUS EPILEPTICUS: ICD-10-CM

## 2022-10-26 RX ORDER — LEVETIRACETAM 750 MG/1
1500 TABLET ORAL 2 TIMES DAILY
Qty: 360 TABLET | Refills: 0 | Status: SHIPPED | OUTPATIENT
Start: 2022-10-26 | End: 2023-01-24

## 2022-10-26 RX ORDER — LAMOTRIGINE 200 MG/1
TABLET ORAL
Qty: 360 TABLET | Refills: 1 | Status: SHIPPED | OUTPATIENT
Start: 2022-10-26 | End: 2023-04-24

## 2022-10-26 NOTE — TELEPHONE ENCOUNTER
----- Message from Tiffany Elias sent at 10/26/2022 11:47 AM CDT -----  Regarding: refill  Contact: [phone]  Pt is requesting a refill on his Rx lamoTRIgine (LAMICTAL) 200 MG tablet and levETIRAcetam (KEPPRA) 750 MG Tab. Please call to discuss further.      [facility]/pharmacy #[id number] - [place] [address] [facility] [address]  [facility] [address]  Phone: [phone] Fax: [phone]

## 2022-11-29 ENCOUNTER — OFFICE VISIT (OUTPATIENT)
Dept: NEUROLOGY | Facility: CLINIC | Age: 42
End: 2022-11-29
Payer: COMMERCIAL

## 2022-11-29 VITALS
HEART RATE: 71 BPM | DIASTOLIC BLOOD PRESSURE: 67 MMHG | HEIGHT: 74 IN | SYSTOLIC BLOOD PRESSURE: 113 MMHG | WEIGHT: 163.13 LBS | BODY MASS INDEX: 20.93 KG/M2

## 2022-11-29 DIAGNOSIS — G40.219 PARTIAL SYMPTOMATIC EPILEPSY WITH COMPLEX PARTIAL SEIZURES, INTRACTABLE, WITHOUT STATUS EPILEPTICUS: Primary | ICD-10-CM

## 2022-11-29 PROCEDURE — 99214 PR OFFICE/OUTPT VISIT, EST, LEVL IV, 30-39 MIN: ICD-10-PCS | Mod: S$GLB,,, | Performed by: PSYCHIATRY & NEUROLOGY

## 2022-11-29 PROCEDURE — 3008F BODY MASS INDEX DOCD: CPT | Mod: CPTII,S$GLB,, | Performed by: PSYCHIATRY & NEUROLOGY

## 2022-11-29 PROCEDURE — 3078F DIAST BP <80 MM HG: CPT | Mod: CPTII,S$GLB,, | Performed by: PSYCHIATRY & NEUROLOGY

## 2022-11-29 PROCEDURE — 3008F PR BODY MASS INDEX (BMI) DOCUMENTED: ICD-10-PCS | Mod: CPTII,S$GLB,, | Performed by: PSYCHIATRY & NEUROLOGY

## 2022-11-29 PROCEDURE — 99214 OFFICE O/P EST MOD 30 MIN: CPT | Mod: S$GLB,,, | Performed by: PSYCHIATRY & NEUROLOGY

## 2022-11-29 PROCEDURE — 1160F PR REVIEW ALL MEDS BY PRESCRIBER/CLIN PHARMACIST DOCUMENTED: ICD-10-PCS | Mod: CPTII,S$GLB,, | Performed by: PSYCHIATRY & NEUROLOGY

## 2022-11-29 PROCEDURE — 3074F PR MOST RECENT SYSTOLIC BLOOD PRESSURE < 130 MM HG: ICD-10-PCS | Mod: CPTII,S$GLB,, | Performed by: PSYCHIATRY & NEUROLOGY

## 2022-11-29 PROCEDURE — 1159F MED LIST DOCD IN RCRD: CPT | Mod: CPTII,S$GLB,, | Performed by: PSYCHIATRY & NEUROLOGY

## 2022-11-29 PROCEDURE — 99999 PR PBB SHADOW E&M-EST. PATIENT-LVL III: ICD-10-PCS | Mod: PBBFAC,,, | Performed by: PSYCHIATRY & NEUROLOGY

## 2022-11-29 PROCEDURE — 3074F SYST BP LT 130 MM HG: CPT | Mod: CPTII,S$GLB,, | Performed by: PSYCHIATRY & NEUROLOGY

## 2022-11-29 PROCEDURE — 1159F PR MEDICATION LIST DOCUMENTED IN MEDICAL RECORD: ICD-10-PCS | Mod: CPTII,S$GLB,, | Performed by: PSYCHIATRY & NEUROLOGY

## 2022-11-29 PROCEDURE — 99999 PR PBB SHADOW E&M-EST. PATIENT-LVL III: CPT | Mod: PBBFAC,,, | Performed by: PSYCHIATRY & NEUROLOGY

## 2022-11-29 PROCEDURE — 1160F RVW MEDS BY RX/DR IN RCRD: CPT | Mod: CPTII,S$GLB,, | Performed by: PSYCHIATRY & NEUROLOGY

## 2022-11-29 PROCEDURE — 3078F PR MOST RECENT DIASTOLIC BLOOD PRESSURE < 80 MM HG: ICD-10-PCS | Mod: CPTII,S$GLB,, | Performed by: PSYCHIATRY & NEUROLOGY

## 2022-11-29 NOTE — PROGRESS NOTES
[facility]  Epilepsy Clinic Progress Note    [facility] - NEUROLOGY - [address]  [facility], [place]    Date: 11/29/22  Patient Name: Darren Nicolas   MRN: [id number]   PCP: Smita Phillips  Referring Provider: No ref. provider found    Assessment:   Darren Nicolas is a 42 y.o. male Presenting in follow-up for management of epilepsy.  Am concerned about patient's seizure burden.  Patient has historically under reported seizures until his wife came with him today.  There is also a degree of concerned that he is having episodes he is unaware of while at work and possibly during sleep.  Favor EMU admission for capture and characterization of seizure burden as well as possible surgical workup pending results.  Further medication adjustments will be pending this.  Given episodes of presyncope with loss of consciousness obtaining EKG and carotid ultrasound.  Plan:     Problem List Items Addressed This Visit          Neuro    Intractable epilepsy without status epilepticus - Primary    Relevant Orders    EEG,w/awake & asleep record    EMU Monitoring    EKG 12-lead    US Carotid Bilateral     I spent a total of 33 minutes preparing to see the patient, obtaining and reviewing history and results, performing a medically appropriate exam, counseling and educating the patient/family/caregiver, documenting clinical information, coordinating care, and ordering medications, tests, procedures, and referrals.    I completed education on seizure first aid and safety. I recommended seizure precautions with regards to avoiding unsupervised water recreational activity or bathing in tubs, climbing or working at heights, operation of heavy or dangerous machinery, caution around fire and sources of high heat, as well as any other activity which could put a patient at danger in case of a seizure.  I also reviewed the [place] DMV law and recommended that the patient not drive  for 6 months in the event of  breakthrough seizures.    Stephon Ball MD  [facility]   Department of Neurology    Patient note was created using [employer] Dictation.  Any errors in syntax or even information may not have been identified and edited on initial review prior to signing this note.  Subjective:     HPI:   Mr. Darren Nicolas is a 42 y.o. male presenting in follow-up for epilepsy.  Today the patient presents with his wife.  She reports that he is experiencing seizures approximately weekly though he has not reached out about this.  She states that he appears diaphoretic before losing consciousness and then sometimes convulsing.  He sometimes feels lightheaded but remains coherent.  He has had 2-3 generalized tonic-clonic seizures since July.  Coworkers have noted that at times he appears to be able to complete the functions of his job but seems confused and slow to respond.  He lately has noticed episodes of vomiting and nausea prior to focal seizures.    Seizure Type: Focal onset  Seizure Etiology:  Unknown  Current AEDs: Keppra 1500 mg BID, Lamictal 400 mg BID    The patient is not accompanied by family who contribute to the history. This patient has 2 types of seizure as described below. The patient reports having seizures for years The patient reports to have stable seizure control. The seizure frequency is variable. The last seizure was 1 week ago . The patient does report side effects from seizure medication.     Seizure Type 1:   Seizure Description: Confusional episode, garbled speech, wanders with nonpurposeful movements lasting for several minutes with several hours of postictal confusion  Aura: None  Associated Symptoms:  tongue biting and incontinence  Seizure Frequency: Variable, every 4-6 weeks  Last seizure: Yesterday    Seizure Type 2:   Seizure Description: GTC (preceded by seizure type 1) with postictal fatigue  Aura: None  Associated Symptoms:  tongue biting and incontinence  Seizure Frequency: At least  7 in lifetime.   Last seizure: September 2022    Handedness: right  Seizure/ Epilepsy Risk Factors: none  Birth/Developmental History: normal birth history and Normal developmental history  Seizure Triggers/ Provoking Features: missed medications  Previous Seizure Medications: lacosamide (Vimpat, LCS), lamotrigine (Lamictal, LTG) and levetiracetam (Keppra, LEV)  Recent Med Changes: None  Other Treatments: None  Prior Episodes of Status: None  Psychiatric/Behavioral Comorbitidies: Routine MJ use  Surgical Candidacy:  S/p L hemispherpic resection in 2015    Prior Studies:  EEG : Not done  vEEG/ EMU evaluation: Not done  MRI of brain: Not done  AED levels:  LEV 28.2 5/2019  CT/CTA Scan:  Not done  PET Scan: Not done  Neuropsychological Evaluation: Not done  DEXA Scan: Not done  Other studies: Not done    PAST MEDICAL HISTORY:  Past Medical History:   Diagnosis Date    Seizure      PAST SURGICAL HISTORY:  No past surgical history on file.    CURRENT MEDS:  Current Outpatient Medications   Medication Sig Dispense Refill    clobetasol 0.05% (TEMOVATE) 0.05 % Oint AAA hands and feet bid 60 g 3    lamoTRIgine (LAMICTAL) 200 MG tablet Take 1 tablet (200 mg total) by mouth once daily AND 3 tablets (600 mg total) every evening. 360 tablet 1    levETIRAcetam (KEPPRA) 750 MG Tab Take 2 tablets (1,500 mg total) by mouth 2 (two) times daily. NO FURTHER REFILL WITHOUT APPT 360 tablet 0    hydrOXYzine HCL (ATARAX) 25 MG tablet TAKE 1 TABLET BY MOUTH THREE TIMES A DAY AS NEEDED FOR ITCHING. NO DRIVING/OPERATING MACHINERY (Patient not taking: Reported on 8/24/2022) 90 tablet 2    hyoscyamine (LEVSIN/SL) 0.125 mg Subl Place 1 tablet (0.125 mg total) under the tongue every 6 (six) hours as needed (nausea and cramps). (Patient not taking: Reported on 8/24/2022) 60 tablet 0    SOFIA COVID-19 VACCINE, EUA, 0.5 mL Susp       MAGNESIUM ORAL Take by mouth 2 (two) times daily.      sildenafil (VIAGRA) 50 MG tablet Take 1 tablet (50 mg total)  "by mouth daily as needed for Erectile Dysfunction. (Patient not taking: Reported on 2022) 12 tablet 1     No current facility-administered medications for this visit.     ALLERGIES:  Review of patient's allergies indicates:  No Known Allergies    FAMILY HISTORY:  Family History   Problem Relation Age of Onset    Melanoma Neg Hx      SOCIAL HISTORY:  Social History     Tobacco Use    Smoking status: Former     Types: Cigarettes     Quit date: 2022     Years since quittin.4    Smokeless tobacco: Never   Substance Use Topics    Alcohol use: Yes     Comment: Daily Beer Drinker    Drug use: Never     Review of Systems:  12 system review of systems is negative except for the symptoms mentioned in HPI.      Objective:     Vitals:    22 1006   BP: 113/67   Pulse: 71   Weight: 74 kg (163 lb 2.3 oz)   Height: 6' 2" (1.88 m)     General: NAD, well nourished   Eyes: no tearing, discharge, no erythema   ENT: moist mucous membranes of the oral cavity, nares patent    Neck: Supple, full range of motion  Cardiovascular: Warm and well perfused, pulses equal and symmetrical  Lungs: Normal work of breathing, normal chest wall excursions  Skin: No rash, lesions, or breakdown on exposed skin  Psychiatry: Mood and affect are appropriate   Abdomen: soft, non tender, non distended  Extremeties: No cyanosis, clubbing or edema.    Neurological   MENTAL STATUS: Alert and oriented to person, place, and time. Attention and concentration within normal limits. Speech without dysarthria, able to name and repeat without difficulty. Recent and remote memory within normal limits   CRANIAL NERVES: Visual fields intact. PERRL. EOMI. Facial sensation intact. Face symmetrical. Hearing grossly intact. Full shoulder shrug bilaterally. Tongue protrudes midline   SENSORY: Sensation is intact to light touch throughout.   MOTOR: Normal bulk and tone.  5/5 deltoid, biceps, triceps, interosseous, hand  bilaterally. 5/5 iliopsoas, knee " extension/flexion, foot dorsi/plantarflexion bilaterally.    REFLEXES: Symmetric and 1+ throughout.   CEREBELLAR/COORDINATION/GAIT: Gait steady in ortho walking boot.  Normal rapid alternating movements.

## 2022-12-05 ENCOUNTER — TELEPHONE (OUTPATIENT)
Dept: NEUROLOGY | Facility: CLINIC | Age: 42
End: 2022-12-05
Payer: COMMERCIAL

## 2022-12-05 NOTE — TELEPHONE ENCOUNTER
Spoke with patient about scheduling EMU, aware an adult is required to accompany him  for the duration. Needs to look at his schedule and see when is a good time for this. Has my direct line as POC, will call me back when ready to schedule. Feels January may be best.

## 2022-12-13 ENCOUNTER — HOSPITAL ENCOUNTER (OUTPATIENT)
Dept: CARDIOLOGY | Facility: CLINIC | Age: 42
Discharge: HOME | End: 2022-12-13
Payer: COMMERCIAL

## 2022-12-13 ENCOUNTER — HOSPITAL ENCOUNTER (OUTPATIENT)
Dept: NEUROLOGY | Facility: CLINIC | Age: 42
Discharge: HOME | End: 2022-12-13
Payer: COMMERCIAL

## 2022-12-13 DIAGNOSIS — G40.219 PARTIAL SYMPTOMATIC EPILEPSY WITH COMPLEX PARTIAL SEIZURES, INTRACTABLE, WITHOUT STATUS EPILEPTICUS: ICD-10-CM

## 2022-12-13 PROCEDURE — 95816 EEG AWAKE AND DROWSY: CPT | Mod: S$GLB,,, | Performed by: PSYCHIATRY & NEUROLOGY

## 2022-12-13 PROCEDURE — 93010 EKG 12-LEAD: ICD-10-PCS | Mod: S$GLB,,, | Performed by: INTERNAL MEDICINE

## 2022-12-13 PROCEDURE — 93010 ELECTROCARDIOGRAM REPORT: CPT | Mod: S$GLB,,, | Performed by: INTERNAL MEDICINE

## 2022-12-13 PROCEDURE — 93005 ELECTROCARDIOGRAM TRACING: CPT | Mod: S$GLB,,, | Performed by: PSYCHIATRY & NEUROLOGY

## 2022-12-13 PROCEDURE — 95816 PR EEG,W/AWAKE & DROWSY RECORD: ICD-10-PCS | Mod: S$GLB,,, | Performed by: PSYCHIATRY & NEUROLOGY

## 2022-12-13 PROCEDURE — 93005 EKG 12-LEAD: ICD-10-PCS | Mod: S$GLB,,, | Performed by: PSYCHIATRY & NEUROLOGY

## 2022-12-13 NOTE — PROCEDURES
Date of service  12/13/2022    Introduction  Electroencephalographic (EEG) recording is recorded with electrodes placed according to the International 10-20 placement system.  Thirty (30) channels of digital signal (sampling rate of 512/sec) were simultaneously recorded from the scalp and may include EKG, EMG, and/or eye monitors.  Recording band pass was 0.1 to 512 Hz.  Digital video recording of the patient is simultaneously recorded with the EEG.  The patient is instructed to report clinical symptoms which may occur during the recording session.  EEG and video recording are stored and archived in digital format.  Activation procedures, which include photic stimulation, hyperventilation and instructing patients to perform simple tasks, are done in selected patients.    The EEG is displayed on a monitor screen and can be reviewed using different montages.  Computer assisted-analysis is employed to detect spike and electrographic seizure activity.  The entire record is submitted for computer analysis.  The entire recording is visually reviewed, and the times identified by computer analysis as being spikes or seizures are reviewed again.    Compressed spectral analysis (CSA) is also performed on the activity recorded from each individual channel.  This is displayed as a power display of frequencies from 0 to 30 Hz over time.  The CSA is reviewed looking for asymmetries in power between homologous areas of the scalp, then compared with the original EEG recording.    Findings  The patient's background consists of a posteriorly dominant 10.5 Hz alpha rhythm, which is well formed, well modulated, and abolishes with eye opening.  Anteriorly, the patient's background consists of predominantly beta range frequencies.  There is no focal slowing.  There are no focal, lateralized, or epileptiform transients.  Note is made of wicket spikes on the left.  No electrographic seizures are seen.    During the course of the recording,  the patient is noted to be awake, and subsequently becomes drowsy.  Normal sleep architecture is not appreciated.    Hyperventilation and photic stimulation did not induce pathologic changes in the EEG.    EKG demonstrates sinus bradycardia.    Interpretation  This is a normal EEG in an awake and drowsy adult.  Please be aware that a normal EEG does not exclude the possibility of an underlying seizure disorder.

## 2022-12-14 ENCOUNTER — HOSPITAL ENCOUNTER (OUTPATIENT)
Dept: RADIOLOGY | Facility: HOSPITAL | Age: 42
Discharge: HOME | End: 2022-12-14
Attending: PSYCHIATRY & NEUROLOGY
Payer: COMMERCIAL

## 2022-12-14 DIAGNOSIS — G40.219 PARTIAL SYMPTOMATIC EPILEPSY WITH COMPLEX PARTIAL SEIZURES, INTRACTABLE, WITHOUT STATUS EPILEPTICUS: ICD-10-CM

## 2022-12-14 PROCEDURE — 93880 US CAROTID BILATERAL: ICD-10-PCS | Mod: 26,,, | Performed by: RADIOLOGY

## 2022-12-14 PROCEDURE — 93880 EXTRACRANIAL BILAT STUDY: CPT | Mod: 26,,, | Performed by: RADIOLOGY

## 2022-12-14 PROCEDURE — 93880 EXTRACRANIAL BILAT STUDY: CPT | Mod: TC

## 2023-01-25 ENCOUNTER — TELEPHONE (OUTPATIENT)
Dept: NEUROLOGY | Facility: CLINIC | Age: 43
End: 2023-01-25
Payer: COMMERCIAL

## 2023-01-25 NOTE — TELEPHONE ENCOUNTER
Spoke with patient about scheduling EMU. He does want to move forward with this admission, however he works in the [employer] business and this is the busiest time of year for his work. He prefers Summer, June, July, or August. I agreed to call him back when that schedule opens

## 2023-03-08 ENCOUNTER — TELEPHONE (OUTPATIENT)
Dept: NEUROLOGY | Facility: CLINIC | Age: 43
End: 2023-03-08
Payer: COMMERCIAL

## 2023-05-16 ENCOUNTER — TELEPHONE (OUTPATIENT)
Dept: NEUROLOGY | Facility: CLINIC | Age: 43
End: 2023-05-16
Payer: COMMERCIAL

## 2023-07-10 ENCOUNTER — TELEPHONE (OUTPATIENT)
Dept: NEUROLOGY | Facility: CLINIC | Age: 43
End: 2023-07-10
Payer: COMMERCIAL

## 2023-07-10 DIAGNOSIS — G40.219 PARTIAL SYMPTOMATIC EPILEPSY WITH COMPLEX PARTIAL SEIZURES, INTRACTABLE, WITHOUT STATUS EPILEPTICUS: Primary | ICD-10-CM

## 2023-07-10 NOTE — TELEPHONE ENCOUNTER
Scheduled EMU 9/5/23, 11am. Aware an adult is required to accompany him for the duration including overnight. Aware he will be connected to lines to his head, chest, arm, have an IV placed; will not be able to leave the room until all equipment is removed at discharge. Reports smoking cigarettes; is aware he can request a nicotine patch. Denies any other special needs. Instructions thoroughly discussed, mailed via [employer].

## 2023-07-24 RX ORDER — LAMOTRIGINE 200 MG/1
TABLET ORAL
Qty: 360 TABLET | Refills: 0 | Status: SHIPPED | OUTPATIENT
Start: 2023-07-24 | End: 2023-10-26

## 2023-08-22 ENCOUNTER — TELEPHONE (OUTPATIENT)
Dept: NEUROLOGY | Facility: CLINIC | Age: 43
End: 2023-08-22
Payer: COMMERCIAL

## 2023-09-05 ENCOUNTER — DOCUMENTATION ONLY (OUTPATIENT)
Dept: NEUROLOGY | Facility: CLINIC | Age: 43
End: 2023-09-05

## 2023-09-05 ENCOUNTER — HOSPITAL ENCOUNTER (INPATIENT)
Facility: HOSPITAL | Age: 43
LOS: 1 days | Discharge: HOME | DRG: 101 | End: 2023-09-06
Attending: PSYCHIATRY & NEUROLOGY | Admitting: PSYCHIATRY & NEUROLOGY
Payer: COMMERCIAL

## 2023-09-05 DIAGNOSIS — G40.219 PARTIAL SYMPTOMATIC EPILEPSY WITH COMPLEX PARTIAL SEIZURES, INTRACTABLE, WITHOUT STATUS EPILEPTICUS: ICD-10-CM

## 2023-09-05 DIAGNOSIS — G40.909 NONINTRACTABLE EPILEPSY WITHOUT STATUS EPILEPTICUS, UNSPECIFIED EPILEPSY TYPE: ICD-10-CM

## 2023-09-05 DIAGNOSIS — G40.909 EPILEPSY: ICD-10-CM

## 2023-09-05 DIAGNOSIS — Z72.0 TOBACCO ABUSE: Primary | ICD-10-CM

## 2023-09-05 PROBLEM — F10.10 ALCOHOL ABUSE: Status: ACTIVE | Noted: 2023-09-05

## 2023-09-05 PROBLEM — F12.90 MARIJUANA USE: Status: ACTIVE | Noted: 2023-09-05

## 2023-09-05 LAB
ALBUMIN SERPL BCP-MCNC: 3.9 G/DL (ref 3.5–5.2)
ALP SERPL-CCNC: 52 U/L (ref 55–135)
ALT SERPL W/O P-5'-P-CCNC: 17 U/L (ref 10–44)
AMPHET+METHAMPHET UR QL: NEGATIVE
ANION GAP SERPL CALC-SCNC: 10 MMOL/L (ref 8–16)
AST SERPL-CCNC: 19 U/L (ref 10–40)
BARBITURATES UR QL SCN>200 NG/ML: NEGATIVE
BASOPHILS # BLD AUTO: 0.06 K/UL (ref 0–0.2)
BASOPHILS NFR BLD: 0.8 % (ref 0–1.9)
BENZODIAZ UR QL SCN>200 NG/ML: NEGATIVE
BILIRUB SERPL-MCNC: 0.5 MG/DL (ref 0.1–1)
BUN SERPL-MCNC: 14 MG/DL (ref 6–20)
BZE UR QL SCN: NEGATIVE
CALCIUM SERPL-MCNC: 9.4 MG/DL (ref 8.7–10.5)
CANNABINOIDS UR QL SCN: ABNORMAL
CHLORIDE SERPL-SCNC: 106 MMOL/L (ref 95–110)
CO2 SERPL-SCNC: 23 MMOL/L (ref 23–29)
CREAT SERPL-MCNC: 0.8 MG/DL (ref 0.5–1.4)
CREAT UR-MCNC: 89 MG/DL (ref 23–375)
DIFFERENTIAL METHOD: ABNORMAL
EOSINOPHIL # BLD AUTO: 0.1 K/UL (ref 0–0.5)
EOSINOPHIL NFR BLD: 1.4 % (ref 0–8)
ERYTHROCYTE [DISTWIDTH] IN BLOOD BY AUTOMATED COUNT: 12.6 % (ref 11.5–14.5)
EST. GFR  (NO RACE VARIABLE): >60 ML/MIN/1.73 M^2
ETHANOL UR-MCNC: <10 MG/DL
GLUCOSE SERPL-MCNC: 103 MG/DL (ref 70–110)
HCT VFR BLD AUTO: 43.9 % (ref 40–54)
HGB BLD-MCNC: 14.6 G/DL (ref 14–18)
IMM GRANULOCYTES # BLD AUTO: 0.02 K/UL (ref 0–0.04)
IMM GRANULOCYTES NFR BLD AUTO: 0.3 % (ref 0–0.5)
LYMPHOCYTES # BLD AUTO: 2.6 K/UL (ref 1–4.8)
LYMPHOCYTES NFR BLD: 36.7 % (ref 18–48)
MCH RBC QN AUTO: 32.1 PG (ref 27–31)
MCHC RBC AUTO-ENTMCNC: 33.3 G/DL (ref 32–36)
MCV RBC AUTO: 97 FL (ref 82–98)
METHADONE UR QL SCN>300 NG/ML: NEGATIVE
MONOCYTES # BLD AUTO: 1 K/UL (ref 0.3–1)
MONOCYTES NFR BLD: 14 % (ref 4–15)
NEUTROPHILS # BLD AUTO: 3.4 K/UL (ref 1.8–7.7)
NEUTROPHILS NFR BLD: 46.8 % (ref 38–73)
NRBC BLD-RTO: 0 /100 WBC
OPIATES UR QL SCN: NEGATIVE
PCP UR QL SCN>25 NG/ML: NEGATIVE
PLATELET # BLD AUTO: 236 K/UL (ref 150–450)
PMV BLD AUTO: 9.2 FL (ref 9.2–12.9)
POTASSIUM SERPL-SCNC: 4 MMOL/L (ref 3.5–5.1)
PROT SERPL-MCNC: 6.7 G/DL (ref 6–8.4)
RBC # BLD AUTO: 4.55 M/UL (ref 4.6–6.2)
SODIUM SERPL-SCNC: 139 MMOL/L (ref 136–145)
TOXICOLOGY INFORMATION: ABNORMAL
WBC # BLD AUTO: 7.16 K/UL (ref 3.9–12.7)

## 2023-09-05 PROCEDURE — 95720 PR EEG, W/VIDEO, CONT RECORD, I&R, >12<26 HRS: ICD-10-PCS | Mod: ,,, | Performed by: PSYCHIATRY & NEUROLOGY

## 2023-09-05 PROCEDURE — 80307 DRUG TEST PRSMV CHEM ANLYZR: CPT | Performed by: PHYSICIAN ASSISTANT

## 2023-09-05 PROCEDURE — S4991 NICOTINE PATCH NONLEGEND: HCPCS | Performed by: PHYSICIAN ASSISTANT

## 2023-09-05 PROCEDURE — 99223 1ST HOSP IP/OBS HIGH 75: CPT | Mod: ,,, | Performed by: PSYCHIATRY & NEUROLOGY

## 2023-09-05 PROCEDURE — 80175 DRUG SCREEN QUAN LAMOTRIGINE: CPT | Performed by: PHYSICIAN ASSISTANT

## 2023-09-05 PROCEDURE — A4216 STERILE WATER/SALINE, 10 ML: HCPCS | Performed by: PHYSICIAN ASSISTANT

## 2023-09-05 PROCEDURE — 80177 DRUG SCRN QUAN LEVETIRACETAM: CPT | Performed by: PHYSICIAN ASSISTANT

## 2023-09-05 PROCEDURE — 85025 COMPLETE CBC W/AUTO DIFF WBC: CPT | Performed by: PHYSICIAN ASSISTANT

## 2023-09-05 PROCEDURE — 25000003 PHARM REV CODE 250: Performed by: PHYSICIAN ASSISTANT

## 2023-09-05 PROCEDURE — 93010 ELECTROCARDIOGRAM REPORT: CPT | Mod: ,,, | Performed by: INTERNAL MEDICINE

## 2023-09-05 PROCEDURE — 80053 COMPREHEN METABOLIC PANEL: CPT | Performed by: PHYSICIAN ASSISTANT

## 2023-09-05 PROCEDURE — 95714 VEEG EA 12-26 HR UNMNTR: CPT

## 2023-09-05 PROCEDURE — 93010 PR ELECTROCARDIOGRAM REPORT: ICD-10-PCS | Mod: ,,, | Performed by: INTERNAL MEDICINE

## 2023-09-05 PROCEDURE — 95720 EEG PHY/QHP EA INCR W/VEEG: CPT | Mod: ,,, | Performed by: PSYCHIATRY & NEUROLOGY

## 2023-09-05 PROCEDURE — 95700 EEG CONT REC W/VID EEG TECH: CPT

## 2023-09-05 PROCEDURE — 11000001 HC ACUTE MED/SURG PRIVATE ROOM

## 2023-09-05 PROCEDURE — 99223 PR INITIAL HOSPITAL CARE,LEVL III: ICD-10-PCS | Mod: ,,, | Performed by: PSYCHIATRY & NEUROLOGY

## 2023-09-05 PROCEDURE — 93005 ELECTROCARDIOGRAM TRACING: CPT

## 2023-09-05 RX ORDER — LORAZEPAM 2 MG/ML
2 INJECTION INTRAMUSCULAR EVERY 10 MIN PRN
Status: COMPLETED | OUTPATIENT
Start: 2023-09-05 | End: 2023-09-06

## 2023-09-05 RX ORDER — ACETAMINOPHEN 325 MG/1
650 TABLET ORAL EVERY 4 HOURS PRN
Status: DISCONTINUED | OUTPATIENT
Start: 2023-09-05 | End: 2023-09-06 | Stop reason: HOSPADM

## 2023-09-05 RX ORDER — DOCUSATE SODIUM 100 MG/1
100 CAPSULE, LIQUID FILLED ORAL DAILY PRN
Status: DISCONTINUED | OUTPATIENT
Start: 2023-09-05 | End: 2023-09-06 | Stop reason: HOSPADM

## 2023-09-05 RX ORDER — ONDANSETRON 8 MG/1
8 TABLET, ORALLY DISINTEGRATING ORAL EVERY 8 HOURS PRN
Status: DISCONTINUED | OUTPATIENT
Start: 2023-09-05 | End: 2023-09-06 | Stop reason: HOSPADM

## 2023-09-05 RX ORDER — SODIUM CHLORIDE 0.9 % (FLUSH) 0.9 %
3 SYRINGE (ML) INJECTION EVERY 8 HOURS
Status: DISCONTINUED | OUTPATIENT
Start: 2023-09-05 | End: 2023-09-06 | Stop reason: HOSPADM

## 2023-09-05 RX ORDER — IBUPROFEN 200 MG
1 TABLET ORAL DAILY
Status: DISCONTINUED | OUTPATIENT
Start: 2023-09-05 | End: 2023-09-06 | Stop reason: HOSPADM

## 2023-09-05 RX ORDER — ONDANSETRON 2 MG/ML
4 INJECTION INTRAMUSCULAR; INTRAVENOUS EVERY 8 HOURS PRN
Status: DISCONTINUED | OUTPATIENT
Start: 2023-09-05 | End: 2023-09-06 | Stop reason: HOSPADM

## 2023-09-05 RX ADMIN — NICOTINE 1 PATCH: 14 PATCH, EXTENDED RELEASE TRANSDERMAL at 08:09

## 2023-09-05 RX ADMIN — Medication 3 ML: at 02:09

## 2023-09-05 RX ADMIN — Medication 3 ML: at 09:09

## 2023-09-05 NOTE — ASSESSMENT & PLAN NOTE
"43M PMHx of etoh abuse, tobacco abuse, marijuana use, and longstanding epilepsy s/p left hemispheric resection in 2015 in [place] currently maintained on Levetiracetam 1500 mg BID and Lamotrigine 300 mg/500 mg referred to EMU by Dr. Ball for event capture and characterization. Events described as 1) saying "uh oh" or "oh god" followed by brief period of unresponsiveness, may have nonsensical speech 2) type 1 progresses into GTC sometimes associated with tongue biting and incontinence. Seizures tend to occur in early morning and triggered by missed AEDs. No other known triggers. He reports AED compliance. Of note, patient reports poor memory since resection in 2015.    - Continuous vEEG   - Held home AEDs on admit (last doses 9/5 AM)  - AED levels sent  - Activation procedures per protocol- medication adjustments as above  - IV Ativan PRN for GTC greater than 5 min - please call epilepsy on call   - Seizure precautions, suction and oxygen at bedside  - Fall precautions, side rail padding in place  - Visi monitoring with continuous pulse oximetry   - Telemetry  "

## 2023-09-05 NOTE — HPI
"43 year old male with a PMHx of longstanding epilepsy s/p left hemispheric resection in 2015 in [place] currently maintained on Levetiracetam 1500 mg BID and Lamotrigine 300 mg/500 mg referred to EMU by Dr. Ball for event capture and characterization. Events described as 1) saying "uh oh" or "oh god" followed by unresponsiveness, may have nonsensical speech 2) type 1 progresses into GTC sometimes associated with tongue biting and incontinence. Type 1 is very brief, lasting <2 minutes. Wife suspects these are occuring every 10 days. GTC occur ~6 months, last was 2 months ago. All seizures tend to occur in early morning. Patient reports he may have dizziness, "cold sweats", and nausea which seem to occur with and without seizures. Patient is unaware of seizures. Wife is concerned events are occurring at work and during sleep. Seizures triggered by missed AEDs. No other known triggers. He reports AED compliance. Of note, patient reports poor memory since resection in 2015.    He endorse daily marijuana use, drinking 6 beers a day for "years", and 1 pack of cigarettes since age 15.        No previous imaging on file.    Routine EEG 12/2022 read as normal.  "

## 2023-09-05 NOTE — H&P
"[facility] - [facility] ([facility])  Neurology-Epilepsy  History & Physical    Patient Name: Darren Nicolas  MRN: [id number]   Admission Date: 9/5/2023  Code Status: Full Code   Attending Provider: Stephon Ball MD   Primary Care Physician: Smita Phillips FNP  Principal Problem:Intractable epilepsy without status epilepticus    Subjective:     Chief Complaint:  EMU     HPI:   43 year old male with a PMHx of longstanding epilepsy s/p left hemispheric resection in 2015 in [place] currently maintained on Levetiracetam 1500 mg BID and Lamotrigine 300 mg/500 mg referred to EMU by Dr. Ball for event capture and characterization. Events described as 1) saying "uh oh" or "oh god" followed by unresponsiveness, may have nonsensical speech 2) type 1 progresses into GTC sometimes associated with tongue biting and incontinence. Type 1 is very brief, lasting <2 minutes. Wife suspects these are occuring every 10 days. GTC occur ~6 months, last was 2 months ago. All seizures tend to occur in early morning. Patient reports he may have dizziness, "cold sweats", and nausea which seem to occur with and without seizures. Patient is unaware of seizures. Wife is concerned events are occurring at work and during sleep. Seizures triggered by missed AEDs. No other known triggers. He reports AED compliance. Of note, patient reports poor memory since resection in 2015.    He endorse daily marijuana use, drinking 6 beers a day for "years", and 1 pack of cigarettes since age 15.        No previous imaging on file.    Routine EEG 12/2022 read as normal.      Past Medical History:   Diagnosis Date    Seizure        No past surgical history on file.    Review of patient's allergies indicates:  No Known Allergies    No current facility-administered medications on file prior to encounter.     Current Outpatient Medications on File Prior to Encounter   Medication Sig    clobetasol 0.05% (TEMOVATE) 0.05 % Oint AAA hands and feet bid    hyoscyamine " (LEVSIN/SL) 0.125 mg Subl Place 1 tablet (0.125 mg total) under the tongue every 6 (six) hours as needed (nausea and cramps). (Patient not taking: Reported on 2022)    levETIRAcetam (KEPPRA) 750 MG Tab TAKE 2 TABLETS (1,500 MG TOTAL) BY MOUTH 2 (TWO) TIMES DAILY. NO FURTHER REFILL WITHOUT APPT    sildenafil (VIAGRA) 50 MG tablet Take 1 tablet (50 mg total) by mouth daily as needed for Erectile Dysfunction. (Patient not taking: Reported on 2022)    [DISCONTINUED] hydrOXYzine HCL (ATARAX) 25 MG tablet TAKE 1 TABLET BY MOUTH THREE TIMES A DAY AS NEEDED FOR ITCHING. NO DRIVING/OPERATING MACHINERY (Patient not taking: Reported on 2022)    [DISCONTINUED] SOFIA COVID-19 VACCINE, EUA, 0.5 mL Susp     [DISCONTINUED] MAGNESIUM ORAL Take by mouth 2 (two) times daily.     Continuous Infusions:    Family History    None       Tobacco Use    Smoking status: Former     Current packs/day: 0.00     Types: Cigarettes     Quit date: 2022     Years since quittin.2    Smokeless tobacco: Never   Substance and Sexual Activity    Alcohol use: Yes     Comment: Daily Beer Drinker    Drug use: Never    Sexual activity: Yes     Review of Systems   Neurological:  Positive for seizures.   All other systems reviewed and are negative.    Objective:     Vital Signs (Most Recent):  Temp: 98.3 °F (36.8 °C) (23 1150)  Pulse: 60 (23 1150)  Resp: 16 (23 1150)  BP: 111/68 (23 1150)  SpO2: 98 % (23 1150) Vital Signs (24h Range):  Temp:  [98.3 °F (36.8 °C)] 98.3 °F (36.8 °C)  Pulse:  [60] 60  Resp:  [16] 16  SpO2:  [98 %] 98 %  BP: (111)/(68) 111/68        There is no height or weight on file to calculate BMI.     Physical Exam  Vitals reviewed.   Constitutional:       General: He is not in acute distress.     Appearance: He is not diaphoretic.   HENT:      Head: Normocephalic and atraumatic.   Eyes:      General: No scleral icterus.        Right eye: No discharge.         Left eye: No  discharge.      Extraocular Movements: Extraocular movements intact and EOM normal.      Conjunctiva/sclera: Conjunctivae normal.      Pupils: Pupils are equal, round, and reactive to light.   Cardiovascular:      Rate and Rhythm: Normal rate.   Pulmonary:      Effort: Pulmonary effort is normal. No respiratory distress.   Abdominal:      General: There is no distension.      Palpations: Abdomen is soft.      Tenderness: There is no abdominal tenderness.   Musculoskeletal:         General: No swelling, tenderness or deformity. Normal range of motion.   Skin:     General: Skin is warm and dry.   Neurological:      General: No focal deficit present.      Mental Status: He is alert and oriented to person, place, and time. Mental status is at baseline.   Psychiatric:         Mood and Affect: Mood normal.         Speech: Speech normal.         Behavior: Behavior normal.            NEUROLOGICAL EXAMINATION:     MENTAL STATUS   Oriented to person, place, and time.   Attention: normal. Concentration: normal.   Speech: speech is normal   Level of consciousness: alert    CRANIAL NERVES     CN II   Visual fields full to confrontation.     CN III, IV, VI   Pupils are equal, round, and reactive to light.  Extraocular motions are normal.     CN V   Right corneal reflex: normal  Left corneal reflex: normal    CN VII   Facial expression full, symmetric.     CN VIII   Hearing: intact    CN XI   CN XI normal.     CN XII   CN XII normal.       Significant Labs: All pertinent lab results from the past 24 hours have been reviewed.    Significant Studies: I have reviewed all pertinent imaging results/findings within the past 24 hours.    Assessment and Plan:     * Intractable epilepsy without status epilepticus  43M PMHx of etoh abuse, tobacco abuse, marijuana use, and longstanding epilepsy s/p left hemispheric resection in 2015 in [place] currently maintained on Levetiracetam 1500 mg BID and Lamotrigine 300 mg/500 mg referred to EMU by  "Dr. Ball for event capture and characterization. Events described as 1) saying "uh oh" or "oh god" followed by brief period of unresponsiveness, may have nonsensical speech 2) type 1 progresses into GTC sometimes associated with tongue biting and incontinence. Seizures tend to occur in early morning and triggered by missed AEDs. No other known triggers. He reports AED compliance. Of note, patient reports poor memory since resection in 2015.    - Continuous vEEG   - Held home AEDs on admit (last doses 9/5 AM)  - AED levels sent  - Activation procedures per protocol- medication adjustments as above  - IV Ativan PRN for GTC greater than 5 min - please call epilepsy on call   - Seizure precautions, suction and oxygen at bedside  - Fall precautions, side rail padding in place  - Visi monitoring with continuous pulse oximetry   - Telemetry    Alcohol abuse  - Reports drinking 6 beers daily for "years"  - Utox ordered  - Monitor for s/s etoh withdrawal  - Telemetry    Marijuana use  - Daily marijuana use  - Utox ordered    Tobacco abuse  ~1 pack a day since age 15  - Nicotine patch ordered  - Tobacco cessation education        VTE Risk Mitigation (From admission, onward)         Ordered     Place SHANTE hose  Until discontinued         09/05/23 1157     Place sequential compression device  Until discontinued         09/05/23 1157     IP VTE LOW RISK PATIENT  Once         09/05/23 1157                Alisa Ball PA-C  Neurology-Epilepsy  [facility] - [facility]  Staff: Dr. Ball  "

## 2023-09-05 NOTE — SUBJECTIVE & OBJECTIVE
Past Medical History:   Diagnosis Date    Seizure        No past surgical history on file.    Review of patient's allergies indicates:  No Known Allergies    No current facility-administered medications on file prior to encounter.     Current Outpatient Medications on File Prior to Encounter   Medication Sig    clobetasol 0.05% (TEMOVATE) 0.05 % Oint AAA hands and feet bid    hyoscyamine (LEVSIN/SL) 0.125 mg Subl Place 1 tablet (0.125 mg total) under the tongue every 6 (six) hours as needed (nausea and cramps). (Patient not taking: Reported on 2022)    levETIRAcetam (KEPPRA) 750 MG Tab TAKE 2 TABLETS (1,500 MG TOTAL) BY MOUTH 2 (TWO) TIMES DAILY. NO FURTHER REFILL WITHOUT APPT    sildenafil (VIAGRA) 50 MG tablet Take 1 tablet (50 mg total) by mouth daily as needed for Erectile Dysfunction. (Patient not taking: Reported on 2022)    [DISCONTINUED] hydrOXYzine HCL (ATARAX) 25 MG tablet TAKE 1 TABLET BY MOUTH THREE TIMES A DAY AS NEEDED FOR ITCHING. NO DRIVING/OPERATING MACHINERY (Patient not taking: Reported on 2022)    [DISCONTINUED] SOFIA COVID-19 VACCINE, EUA, 0.5 mL Susp     [DISCONTINUED] MAGNESIUM ORAL Take by mouth 2 (two) times daily.     Continuous Infusions:    Family History    None       Tobacco Use    Smoking status: Former     Current packs/day: 0.00     Types: Cigarettes     Quit date: 2022     Years since quittin.2    Smokeless tobacco: Never   Substance and Sexual Activity    Alcohol use: Yes     Comment: Daily Beer Drinker    Drug use: Never    Sexual activity: Yes     Review of Systems   Neurological:  Positive for seizures.   All other systems reviewed and are negative.    Objective:     Vital Signs (Most Recent):  Temp: 98.3 °F (36.8 °C) (23 1150)  Pulse: 60 (23 1150)  Resp: 16 (23 1150)  BP: 111/68 (23 1150)  SpO2: 98 % (23 1150) Vital Signs (24h Range):  Temp:  [98.3 °F (36.8 °C)] 98.3 °F (36.8 °C)  Pulse:  [60] 60  Resp:  [16] 16  SpO2:   [98 %] 98 %  BP: (111)/(68) 111/68        There is no height or weight on file to calculate BMI.     Physical Exam  Vitals reviewed.   Constitutional:       General: He is not in acute distress.     Appearance: He is not diaphoretic.   HENT:      Head: Normocephalic and atraumatic.   Eyes:      General: No scleral icterus.        Right eye: No discharge.         Left eye: No discharge.      Extraocular Movements: Extraocular movements intact and EOM normal.      Conjunctiva/sclera: Conjunctivae normal.      Pupils: Pupils are equal, round, and reactive to light.   Cardiovascular:      Rate and Rhythm: Normal rate.   Pulmonary:      Effort: Pulmonary effort is normal. No respiratory distress.   Abdominal:      General: There is no distension.      Palpations: Abdomen is soft.      Tenderness: There is no abdominal tenderness.   Musculoskeletal:         General: No swelling, tenderness or deformity. Normal range of motion.   Skin:     General: Skin is warm and dry.   Neurological:      General: No focal deficit present.      Mental Status: He is alert and oriented to person, place, and time. Mental status is at baseline.   Psychiatric:         Mood and Affect: Mood normal.         Speech: Speech normal.         Behavior: Behavior normal.            NEUROLOGICAL EXAMINATION:     MENTAL STATUS   Oriented to person, place, and time.   Attention: normal. Concentration: normal.   Speech: speech is normal   Level of consciousness: alert    CRANIAL NERVES     CN II   Visual fields full to confrontation.     CN III, IV, VI   Pupils are equal, round, and reactive to light.  Extraocular motions are normal.     CN V   Right corneal reflex: normal  Left corneal reflex: normal    CN VII   Facial expression full, symmetric.     CN VIII   Hearing: intact    CN XI   CN XI normal.     CN XII   CN XII normal.       Significant Labs: All pertinent lab results from the past 24 hours have been reviewed.    Significant Studies: I have  reviewed all pertinent imaging results/findings within the past 24 hours.

## 2023-09-05 NOTE — NURSING
Admit to EMU; orient to room, unit and staff; instruct on plan and to call for needs / assistance.  Patient and his wife verbalize understanding.  Patient reports has seizures approximately every 10 days and wife reports patient stares and won't answer her or if he does answer that the response is not appropriate; wife reports he has not loss consciousness; reports grand mal seizure every now and then with the last one two months ago.

## 2023-09-05 NOTE — PLAN OF CARE
Problem: Adult Inpatient Plan of Care  Goal: Plan of Care Review  Outcome: Ongoing, Progressing  Goal: Patient-Specific Goal (Individualized)  Outcome: Ongoing, Progressing  Goal: Absence of Hospital-Acquired Illness or Injury  Outcome: Ongoing, Progressing  Goal: Optimal Comfort and Wellbeing  Outcome: Ongoing, Progressing  Goal: Readiness for Transition of Care  Outcome: Ongoing, Progressing   Review EMU plan; EEG monitoring and hold AEDs

## 2023-09-05 NOTE — ASSESSMENT & PLAN NOTE
"- Reports drinking 6 beers daily for "years"  - Utox ordered  - Monitor for s/s etoh withdrawal  - Telemetry  "

## 2023-09-06 VITALS
WEIGHT: 160.94 LBS | DIASTOLIC BLOOD PRESSURE: 67 MMHG | HEIGHT: 75 IN | SYSTOLIC BLOOD PRESSURE: 111 MMHG | TEMPERATURE: 99 F | BODY MASS INDEX: 20.01 KG/M2 | OXYGEN SATURATION: 96 % | HEART RATE: 66 BPM | RESPIRATION RATE: 20 BRPM

## 2023-09-06 PROCEDURE — 99239 PR HOSPITAL DISCHARGE DAY,>30 MIN: ICD-10-PCS | Mod: ,,, | Performed by: PSYCHIATRY & NEUROLOGY

## 2023-09-06 PROCEDURE — S4991 NICOTINE PATCH NONLEGEND: HCPCS | Performed by: PHYSICIAN ASSISTANT

## 2023-09-06 PROCEDURE — 63600175 PHARM REV CODE 636 W HCPCS: Performed by: PHYSICIAN ASSISTANT

## 2023-09-06 PROCEDURE — 99239 HOSP IP/OBS DSCHRG MGMT >30: CPT | Mod: ,,, | Performed by: PSYCHIATRY & NEUROLOGY

## 2023-09-06 PROCEDURE — 25000003 PHARM REV CODE 250: Performed by: PHYSICIAN ASSISTANT

## 2023-09-06 PROCEDURE — 94761 N-INVAS EAR/PLS OXIMETRY MLT: CPT

## 2023-09-06 PROCEDURE — A4216 STERILE WATER/SALINE, 10 ML: HCPCS | Performed by: PHYSICIAN ASSISTANT

## 2023-09-06 RX ORDER — LACOSAMIDE 100 MG/1
100 TABLET ORAL EVERY 12 HOURS
Qty: 60 TABLET | Refills: 3 | Status: SHIPPED | OUTPATIENT
Start: 2023-09-06 | End: 2023-12-05 | Stop reason: SDUPTHER

## 2023-09-06 RX ORDER — LORAZEPAM 2 MG/ML
2 INJECTION INTRAMUSCULAR EVERY 10 MIN PRN
Status: DISCONTINUED | OUTPATIENT
Start: 2023-09-06 | End: 2023-09-06 | Stop reason: HOSPADM

## 2023-09-06 RX ORDER — LAMOTRIGINE 150 MG/1
300 TABLET ORAL DAILY
Status: DISCONTINUED | OUTPATIENT
Start: 2023-09-06 | End: 2023-09-06 | Stop reason: HOSPADM

## 2023-09-06 RX ADMIN — LORAZEPAM 2 MG: 2 INJECTION INTRAMUSCULAR; INTRAVENOUS at 04:09

## 2023-09-06 RX ADMIN — LORAZEPAM 2 MG: 2 INJECTION INTRAMUSCULAR; INTRAVENOUS at 03:09

## 2023-09-06 RX ADMIN — Medication 3 ML: at 02:09

## 2023-09-06 RX ADMIN — NICOTINE 1 PATCH: 14 PATCH, EXTENDED RELEASE TRANSDERMAL at 09:09

## 2023-09-06 RX ADMIN — LAMOTRIGINE 300 MG: 150 TABLET ORAL at 11:09

## 2023-09-06 RX ADMIN — LEVETIRACETAM 3000 MG: 100 INJECTION INTRAVENOUS at 08:09

## 2023-09-06 RX ADMIN — ACETAMINOPHEN 650 MG: 325 TABLET ORAL at 07:09

## 2023-09-06 NOTE — PROGRESS NOTES
09/06/2023      Darren Nicolas  [address] [facility] [address]          Neurology Department  [facility] [address] [place] [address] [phone] after hours  [phone] fax Darren Nicolas has been hospitalized at the [facility] since 9/5/2023.  Please excuse the patient from duties.  Patient may return on 9/11/2023.                         Alisa Ball PA-C  09/06/2023

## 2023-09-06 NOTE — PROCEDURES
Procedures    VIDEO ELECTROENCEPHALOGRAM  REPORT    DATE OF SERVICE: 9/5/23-9/23/23  EEG NUMBER: [id number]  REQUESTED BY:  Quinn  LOCATION OF SERVICE:  [facility]    METHODOLOGY   Electroencephalographic (EEG) recording is with electrodes placed according to the International 10-20 placement system.  Thirty two (32) channels of digital signal (sampling rate of 512/sec) including T1 and T2 was simultaneously recorded from the scalp and may include  EKG, EMG, and/or eye monitors.  Recording band pass was 0.1 to 512 hz.  Digital video recording of the patient is simultaneously recorded with the EEG.  The patient is instructed report clinical symptoms which may occur during the recording session.  EEG and video recording is stored and archived in digital format.  Activation procedures which include photic stimulation, hyperventilation and instructing patients to perform simple task are done in selected patients.   The EEG is displayed on a monitor screen and can be reviewed using different montages.  Computer assisted analysis is employed to detect spike and electrographic seizure activity.   The entire record is submitted for computer analysis.  The entire recording is visually reviewed and the times identified by computer analysis as being spikes or seizures are reviewed again.  Compresses spectral analysis (CSA) is also performed on the activity recorded from each individual channel.  This is displayed as a power display of frequencies from 0 to 30 Hz over time.   The CSA is reviewed looking for asymmetries in power between homologous areas of the scalp and then compared with the original EEG recording.     [employer] software was also utilized in the review of this study.  This software suite analyzes the EEG recording in multiple domains.  Coherence and rhythmicity is computed to identify EEG sections which may contain organized seizures.  Each channel undergoes analysis to detect presence of spike and sharp waves which  have special and morphological characteristic of epileptic activity.  The routine EEG recording is converted from spacial into frequency domain.  This is then displayed comparing homologous areas to identify areas of significant asymmetry.  Algorithm to identify non-cortically generated artifact is used to separate eye movement, EMG and other artifact from the EEG.      ELECTROENCEPHALOGRAM:    RECORDING TIMES:  Start on 9/5/23 at 15:18:25  Stop on 9/6/23 at 07:00:01  Start on 9/6/23 at 07:01:11  Start on 9/6/23 at 13:55:40    A total of 21 hrs of EEG recording was obtained.    INTERICTAL:  The record shows a good  organization at rest, consisting of a 8 Hz posterior dominant rhythm with good  reactivity. There is mild bilateral beta activity. There are frequent spike and wave discharges over F7/T3.    Drowsiness is characterized by attenuation of the background, vertex waves, and bilateral theta slowing. Stage II sleep is characterized by slowing, vertex waves, and symmetric sleep spindles. Slow wave and REM sleep are recorded.    Provocative maneuvers including hyperventilation and photic stimulation were performed.     EKG recording shows a sinus rhythm.    There is one electroclinical seizure There is onset of rhythmic 4-5 Hz spike and wave at 03:35:57 with phase reversals seen at F7-T3. Seizure remains isolated to the left hemisphere until generalization at 03:36. Clinically the patient exhibits slight rightward eye deviation, oral automatisms, right hand automatisms, later forced rightward eye deviation followed by tonic clonic jerking. Electroclinical seizure ends at 03:47:02.    IMPRESSION:    Abnormal study due to left hemispheric focal interictal epileptiform discharges as described above consistent with a focal onset seizure disorder. One electroclinical seizure is seen with left hemispheric onset as described above.    Stephon Ball MD

## 2023-09-06 NOTE — DISCHARGE SUMMARY
"[facility] ([facility])  Neurology-Epilepsy  Discharge Summary      Patient Name: Darren Nicolas  MRN: [id number]  Admission Date: 9/5/2023  Hospital Length of Stay: 1 days  Discharge Date and Time: 9/6/2023  Attending Physician: Stephon Ball MD   Discharging Provider: Alisa Ball PA-C  Primary Care Physician: Smita Phillips FNP    HPI:   43 year old male with a PMHx of longstanding epilepsy s/p left hemispheric resection in 2015 in [place] currently maintained on Levetiracetam 1500 mg BID and Lamotrigine 300 mg/500 mg referred to EMU by Dr. Ball for event capture and characterization. Events described as 1) saying "uh oh" or "oh god" followed by unresponsiveness, may have nonsensical speech 2) type 1 progresses into GTC sometimes associated with tongue biting and incontinence. Type 1 is very brief, lasting <2 minutes. Wife suspects these are occuring every 10 days. GTC occur ~6 months, last was 2 months ago. All seizures tend to occur in early morning. Patient reports he may have dizziness, "cold sweats", and nausea which seem to occur with and without seizures. Patient is unaware of seizures. Wife is concerned events are occurring at work and during sleep. Seizures triggered by missed AEDs. No other known triggers. He reports AED compliance. Of note, patient reports poor memory since resection in 2015.    He endorse daily marijuana use, drinking 6 beers a day for "years", and 1 pack of cigarettes since age 15.        No previous imaging on file.    Routine EEG 12/2022 read as normal.      * No surgery found *     Indwelling Lines/Drains at time of discharge:   Lines/Drains/Airways     None               Hospital Course:   9/5>9/6: Admit to EMU. Home AEDs held. Electroclinical seizure with left onset and secondary generalization; see EEG report for details. Received IV Lorazepam 2 mg x2. Gave IV Levetiracetam load of 3g x1. Resumed home Lamotrigine. Patient requesting discharge home. Discharged home in " "stable condition on adjusted AED regimen: Levetiracetam 1500 mg BID, Lamotrigine 300/500 mg, and Lacosamide 100 mg BID. Nayzilam for rescue. Seizure precautions. Driving restrictions. Extensive counseling on etoh abuse and associated risks with epilepsy. Outpatient follow up in Epilepsy clinic with Dr. Ball.      Goals of Care Treatment Preferences:  Code Status: Full Code      Significant Labs: All pertinent lab results from the past 24 hours have been reviewed.    Significant Studies: I have reviewed all pertinent imaging results/findings within the past 24 hours.    Pending Diagnostic Studies:     Procedure Component Value Units Date/Time    Lamotrigine level [[id number]] Collected: 09/05/23 1212    Order Status: Sent Lab Status: In process Updated: 09/05/23 1216    Specimen: Blood     Levetiracetam Level [[id number]] Collected: 09/05/23 1212    Order Status: Sent Lab Status: In process Updated: 09/05/23 1216    Specimen: Blood         Final Active Diagnoses:    Diagnosis Date Noted POA    PRINCIPAL PROBLEM:  Intractable epilepsy without status epilepticus [G40.919] 12/15/2017 Yes    Tobacco abuse [Z72.0] 09/05/2023 Yes    Marijuana use [F12.90] 09/05/2023 Yes    Alcohol abuse [F10.10] 09/05/2023 Yes      Problems Resolved During this Admission:       Neuro  * Intractable epilepsy without status epilepticus  43M PMHx of etoh abuse, tobacco abuse, marijuana use, and longstanding epilepsy s/p left hemispheric resection in 2015 in [place] currently maintained on Levetiracetam 1500 mg BID and Lamotrigine 300 mg/500 mg referred to EMU by Dr. Ball for event capture and characterization. Events described as 1) saying "uh oh" or "oh god" followed by brief period of unresponsiveness, may have nonsensical speech 2) type 1 progresses into GTC sometimes associated with tongue biting and incontinence. Seizures tend to occur in early morning and triggered by missed AEDs. No other known triggers. He reports AED compliance. " "Of note, patient reports poor memory since resection in 2015.    - Electroclinical seizure with left onset and secondary generalization, see EEG report for details  - Held home AEDs on admit (last doses 9/5 AM)  - AED levels pending  - Received IV Lorazepam 2 mg x2 and IV Levetiracetam load of 3g x1  - Resumed home Lamotrigine  - Patient requesting discharge home  - Discharged home in stable condition on adjusted AED regimen: Levetiracetam 1500 mg BID, Lamotrigine 300/500 mg, and Lacosamide 100 mg BID, Nayzilam for rescue  - Seizure precautions  - Driving restrictions  - Outpatient follow up in Epilepsy clinic with Dr. Ball    Psychiatric  Alcohol abuse  - Reports drinking 6 beers daily for "years"  - Etoh <10 on admit  - Extensive counseling on etoh abuse and associated risks with epilepsy    Palliative Care  Marijuana use  - Daily marijuana use  - Utox +THC  - Cessation education    Other  Tobacco abuse  ~1 pack a day since age 15  - Nicotine patch ordered  - Tobacco cessation education      Discharged Condition: stable    Disposition: Home or Self Care    Follow Up:   Follow-up Information     Smita Phillips FNP. Go on 9/13/2023.    Specialty: Internal Medicine  Why: Hospital follow up 9/13 at 3:20pm  Contact information:  [address] [facility] [id number] [phone]                       Patient Instructions:      Ambulatory referral/consult to Smoking Cessation Program   Standing Status: Future   Referral Priority: Routine Referral Type: Consultation   Referral Reason: Specialty Services Required   Requested Specialty: CTTS   Number of Visits Requested: 1     Reason for not Prescribing Nicotine Replacement     Order Specific Question Answer Comments   Reason for not Prescribing: Not medically appropriate at this time          Patient Instructions       Per [place] law, patient must refrain from driving for 6 months after having a seizure, and must be cleared by a " neurologist to legally resume driving. Physician team not required to report pt to DMV. Additionally, advised patient to avoid bathing or swimming alone, climbing ladders or standing near precipice where one could fall, operating heavy machinery and to not supervise children or engage in other activities where losing consciousness or motor control would risk harm to self or others.        Medications:  Reconciled Home Medications:      Medication List      START taking these medications    lacosamide 100 mg Tab  Commonly known as: VIMPAT  Take 1 tablet (100 mg total) by mouth every 12 (twelve) hours.     NAYZILAM 5 mg/spray (0.1 mL) Spry  Generic drug: midazolam  Administer 1 spray in one nostril as needed for rescue of seizure cluster or prolonged seizure. After 10 minutes if seizure activity continues, an additional spray can be administered in other nostril.        CONTINUE taking these medications    clobetasol 0.05% 0.05 % Oint  Commonly known as: TEMOVATE  AAA hands and feet bid     lamoTRIgine 200 MG tablet  Commonly known as: LAMICTAL  Take 1 tablet (200 mg total) by mouth once daily AND 3 tablets (600 mg total) every evening.     levETIRAcetam 750 MG Tab  Commonly known as: KEPPRA  TAKE 2 TABLETS (1,500 MG TOTAL) BY MOUTH 2 (TWO) TIMES DAILY. NO FURTHER REFILL WITHOUT APPT        STOP taking these medications    hyoscyamine 0.125 mg Subl  Commonly known as: LEVSIN/SL     sildenafiL 50 MG tablet  Commonly known as: VIAGRA          Time spent on the discharge of patient: 60 minutes    Alisa Ball PA-C  Neurology-Epilepsy  [facility]  Staff: Dr. Ball

## 2023-09-06 NOTE — DISCHARGE INSTRUCTIONS
Per [place] law, patient must refrain from driving for 6 months after having a seizure, and must be cleared by a neurologist to legally resume driving. Physician team not required to report pt to DMV. Additionally, advised patient to avoid bathing or swimming alone, climbing ladders or standing near precipice where one could fall, operating heavy machinery and to not supervise children or engage in other activities where losing consciousness or motor control would risk harm to self or others.

## 2023-09-06 NOTE — ASSESSMENT & PLAN NOTE
"43M PMHx of etoh abuse, tobacco abuse, marijuana use, and longstanding epilepsy s/p left hemispheric resection in 2015 in [place] currently maintained on Levetiracetam 1500 mg BID and Lamotrigine 300 mg/500 mg referred to EMU by Dr. Ball for event capture and characterization. Events described as 1) saying "uh oh" or "oh god" followed by brief period of unresponsiveness, may have nonsensical speech 2) type 1 progresses into GTC sometimes associated with tongue biting and incontinence. Seizures tend to occur in early morning and triggered by missed AEDs. No other known triggers. He reports AED compliance. Of note, patient reports poor memory since resection in 2015.    - Electroclinical seizure with left onset and secondary generalization, see EEG report for details  - Held home AEDs on admit (last doses 9/5 AM)  - AED levels pending  - Received IV Lorazepam 2 mg x2 and IV Levetiracetam load of 3g x1  - Resumed home Lamotrigine  - Patient requesting discharge home  - Discharged home in stable condition on adjusted AED regimen: Levetiracetam 1500 mg BID, Lamotrigine 300/500 mg, and Lacosamide 100 mg BID, Nayzilam for rescue  - Seizure precautions  - Driving restrictions  - Outpatient follow up in Epilepsy clinic with Dr. Ball  "

## 2023-09-06 NOTE — NURSING
Nurses Note -- 4 Eyes      9/5/2023   10:15 PM      Skin assessed during: Admit      [x] No Altered Skin Integrity Present    []Prevention Measures Documented      [] Yes- Altered Skin Integrity Present or Discovered   [] LDA Added if Not in Epic (Describe Wound)   [] New Altered Skin Integrity was Present on Admit and Documented in LDA   [] Wound Image Taken    Wound Care Consulted? No    Attending Nurse:  Eli Carrasco RN/Staff Member:   Niki

## 2023-09-06 NOTE — PLAN OF CARE
[facility] - Neurosurgery (Hospital)  Discharge Assessment    Primary Care Provider: Smita Phillips FNP     CM obtained discharge planning assessment with patient.  Family at bedside.    Discharge Assessment (most recent)       BRIEF DISCHARGE ASSESSMENT - 09/06/23 1538          Discharge Planning    Assessment Type Discharge Planning Brief Assessment     Resource/Environmental Concerns none     Support Systems Spouse/significant other     Equipment Currently Used at Home none     Current Living Arrangements home     Patient/Family Anticipated Services at Transition none     DME Needed Upon Discharge  none     Discharge Plan A Home with family     Discharge Plan B Home with family

## 2023-09-06 NOTE — PLAN OF CARE
[facility] - Neurosurgery (Hospital)  Discharge Final Note    Primary Care Provider: Smita Phillips FNP    Expected Discharge Date: 9/6/2023    Patient to be discharged home.  The patient does not have any home needs.  Family to provide transportation home.        Final Discharge Note (most recent)       Final Note - 09/06/23 1542          Final Note    Assessment Type Final Discharge Note     Anticipated Discharge Disposition Home or Self Care        Post-Acute Status    Post-Acute Authorization Other     Other Status No Post-Acute Service Needs     Discharge Delays None known at this time                     Important Message from Medicare             Contact Info       Smita Phillips FNP   Specialty: Internal Medicine   Relationship: PCP - General    [address] [facility] [address]   Phone: [phone]       Next Steps: Go on 9/13/2023    Instructions: Hospital follow up 9/13 at 3:20pm

## 2023-09-06 NOTE — NURSING
"Called to room per patient's wife; slight lower facial twitching in jaw.  Patient not answering appropriately .  Mumbling in soft tone; starting to have hiccoughs.  Pupils equal.  07:46  Slowly answering name and ; unable to  answer place; able to state here "because I have seizures".  Continuing with  hiccoughs at 07:50.   O2 sat 97% on room air.  07:51  Reports "I'm alright"   Remains awake with  eyes open looking around continues  with hiccoughs.  Follows directions with  deep breathing and intentional cough.  Continues with hiccoughs.  Patient turned self to  left side lying.    "

## 2023-09-06 NOTE — ASSESSMENT & PLAN NOTE
"- Reports drinking 6 beers daily for "years"  - Etoh <10 on admit  - Extensive counseling on etoh abuse and associated risks with epilepsy  "

## 2023-09-06 NOTE — NURSING
EMU EVENT NOTE:    0337    EMU monitor tech notified bedside nurse that patient was having a seizure. Upon arrival to the bedside, patient lying in bed with rhythmic jerking with head nodding noted.  Event presented with full body convulsions/jerking, both eyes with upward to the right deviation, dilated pupils (not reactive to light), and patient diaphoretic. Patient rolled into side lying position. Event button activated by bedside nurse. Patient unresponsive to both verbal and noxious stimuli. VS monitored via VISI. VSS, see flowsheets for details. Drooling noted and secretions suctioned. When suctioning secretions, small amount of blood noted. Charge RN at bedside helping bedside nurse with care. 6L O2 via NC applied to patient, O2 sat dropped to 85%.Asked charge RN to pull ativan while bedside nurse continued to monitor patient at the bedside; then paged Epilepsy provider on call at 0341 and notified  of seizure. Received orders to give 2mg of Ativan IVP. Convulsions stopped at 0343, patient then had facial twitching and still not responsive to both verbal/noxious stimuli.  While charge RN was pulling Ativan from pyxis, patient began to have another GTC event. Full body jerking, lip smacking, right hand up with finger movements, and eyes deviated up and to the right. GTC movements lasted approximately 2-3 minutes. Charge RN at bedside with Ativan. Patient sat up abruptly and began pulling at EEG wires while attempting to get out of bed. 2mg of Ativan administered. Continued to monitor the patient at bedside. At this time, patient appeared to be postictal. Asked patient to follow commands and orientation questions multiple times but patient unable to follow commands or answer orientation questions. Paged Epilepsy provider on call a second time at 0400 and notified  that patient was still not back to baseline and continuing to attempt to get out bed and tugging at EEG cap. Received orders to  administer additional 2mg of Ativan IVP. Ativan administered, see MAR for details. Patient began to calm down and lay back in bed. VISI battery changed and re-calibrated at that time. VS continued to be stable, see flowsheets for details. Patient now sleeping and resting in bed post second dose of Ativan. Instructed the patient's significant other to activate event button if patient experiences any further events, verbalized understanding.  No acute signs of distress noted at this time. Seizure precautions maintained. Bed locked in low position with bed alarm set, and call light within reach. Instructed patient's significant other to call for assistance, verbalized understanding.

## 2023-09-06 NOTE — PLAN OF CARE
Problem: Adult Inpatient Plan of Care  Goal: Plan of Care Review  Outcome: Ongoing, Progressing  Flowsheets (Taken 9/6/2023 0150)  Plan of Care Reviewed With:   patient   significant other  Goal: Optimal Comfort and Wellbeing  Outcome: Ongoing, Progressing     Problem: Fall Injury Risk  Goal: Absence of Fall and Fall-Related Injury  Outcome: Ongoing, Progressing     Problem: Seizure, Active Management  Goal: Absence of Seizure/Seizure-Related Injury  Outcome: Ongoing, Progressing  Intervention: Prevent Seizure-Related Injury  Flowsheets (Taken 9/6/2023 0150)  Seizure Precautions:   activity supervised   clutter-free environment maintained   emergency equipment at bedside   side rails padded   soft boundaries provided             POC reviewed and updated with the patient/significant other. Questions regarding POC were encouraged and addressed with the patient.  VSS, see flow-sheets. Tele maintained per order. VISI applied for additional monitoring. Patient is AO X 4 at this time. Continuous EEG in place. Fall/safety precautions maintained, no signs of injury noted during shift. Seizure precautions maintained. Patient was repositioned for comfort, bed locked in low position with side rails X 4, bed alarm refused, with call light within reach. Instructed patient to call staff for mobility, verbalized understanding.  No acute signs of distress noted at this time.

## 2023-09-06 NOTE — HOSPITAL COURSE
9/5>9/6: Admit to EMU. Home AEDs held. Electroclinical seizure with left onset and secondary generalization; see EEG report for details. Received IV Lorazepam 2 mg x2. Gave IV Levetiracetam load of 3g x1. Resumed home Lamotrigine. Patient requesting discharge home. Discharged home in stable condition on adjusted AED regimen: Levetiracetam 1500 mg BID, Lamotrigine 300/500 mg, and Lacosamide 100 mg BID. Nayzilam for rescue. Seizure precautions. Driving restrictions. Extensive counseling on etoh abuse and associated risks with epilepsy. Outpatient follow up in Epilepsy clinic with Dr. Ball.

## 2023-09-07 ENCOUNTER — PATIENT OUTREACH (OUTPATIENT)
Dept: ADMINISTRATIVE | Facility: CLINIC | Age: 43
End: 2023-09-07
Payer: COMMERCIAL

## 2023-09-07 PROBLEM — G40.909 NONINTRACTABLE EPILEPSY WITHOUT STATUS EPILEPTICUS: Status: ACTIVE | Noted: 2023-09-07

## 2023-09-07 NOTE — PROGRESS NOTES
C3 nurse attempted to contact Darren Nicolas for a TCC post hospital discharge follow up call. The patient is unable to conduct the call @ this time. The patient requested a callback.    The patient does not have a scheduled HOSFU appointment within 5-7 days post hospital discharge date 9/6/23. Patient does not have and [facility] PCP.

## 2023-09-08 LAB
LAMOTRIGINE SERPL-MCNC: 10.5 UG/ML (ref 2–15)
LEVETIRACETAM SERPL-MCNC: 28.5 UG/ML (ref 3–60)

## 2023-09-08 NOTE — PROGRESS NOTES
C3 nurse attempted to contact Darren Nicolas for a TCC post hospital discharge follow up call. No answer. The patient does not have a scheduled HOSFU appointment, and the pt does not have an [facility] PCP.

## 2023-10-26 RX ORDER — LAMOTRIGINE 200 MG/1
TABLET ORAL
Qty: 360 TABLET | Refills: 0 | Status: SHIPPED | OUTPATIENT
Start: 2023-10-26 | End: 2023-12-13 | Stop reason: SDUPTHER

## 2023-12-13 ENCOUNTER — OFFICE VISIT (OUTPATIENT)
Dept: NEUROLOGY | Facility: CLINIC | Age: 43
End: 2023-12-13
Payer: COMMERCIAL

## 2023-12-13 DIAGNOSIS — G40.219 PARTIAL SYMPTOMATIC EPILEPSY WITH COMPLEX PARTIAL SEIZURES, INTRACTABLE, WITHOUT STATUS EPILEPTICUS: ICD-10-CM

## 2023-12-13 PROCEDURE — 99214 OFFICE O/P EST MOD 30 MIN: CPT | Mod: 95,,, | Performed by: PSYCHIATRY & NEUROLOGY

## 2023-12-13 PROCEDURE — 99214 PR OFFICE/OUTPT VISIT, EST, LEVL IV, 30-39 MIN: ICD-10-PCS | Mod: 95,,, | Performed by: PSYCHIATRY & NEUROLOGY

## 2023-12-13 RX ORDER — LACOSAMIDE 100 MG/1
100 TABLET ORAL EVERY 12 HOURS
Qty: 180 TABLET | Refills: 1 | Status: SHIPPED | OUTPATIENT
Start: 2023-12-13 | End: 2024-02-11

## 2023-12-13 RX ORDER — LAMOTRIGINE 200 MG/1
TABLET ORAL
Qty: 360 TABLET | Refills: 3 | Status: SHIPPED | OUTPATIENT
Start: 2023-12-13 | End: 2024-12-07

## 2023-12-13 RX ORDER — LEVETIRACETAM 750 MG/1
1500 TABLET ORAL 2 TIMES DAILY
Qty: 360 TABLET | Refills: 3 | Status: SHIPPED | OUTPATIENT
Start: 2023-12-13

## 2023-12-13 RX ORDER — LAMOTRIGINE 200 MG/1
TABLET ORAL
Qty: 360 TABLET | Refills: 3 | Status: SHIPPED | OUTPATIENT
Start: 2023-12-13 | End: 2023-12-13 | Stop reason: SDUPTHER

## 2023-12-13 NOTE — PROGRESS NOTES
[facility]  Epilepsy Clinic Progress Note    NEUROLOGY - [address]  [facility], [place]    Date: 12/13/23  Patient Name: Darren Nicolas   MRN: [id number]   PCP: Smita Phillips  Referring Provider: No ref. provider found      The patient location is: Home  The chief complaint leading to consultation is: Epilepsy  Visit type: Virtual visit with synchronous audio and video  Total time spent with patient: 21 min  Each patient to whom he or she provides medical services by telemedicine is:  (1) informed of the relationship between the physician and patient and the respective role of any other health care provider with respect to management of the patient; and (2) notified that he or she may decline to receive medical services by telemedicine and may withdraw from such care at any time.    Notes:    Assessment:   Darren Nicolas is a 43 y.o. male Presenting in follow-up for management of focal onset (left hemisphere) epilepsy.  EMU results reviewed with patient at length.  Of note, patient admitted to alcoholism during hospital stay limiting further candidacy for surgery workup until this is better controlled as patient is unable to tolerate extended hospital stays.  Patient would like to ultimately taper off of Vimpat as he is has not found it beneficial.  Will increase Lamictal via up taper to 400 mg morning and 600 mg night. Last LTG level 10.5  Plan:     Problem List Items Addressed This Visit          Neuro    Intractable epilepsy without status epilepticus    Relevant Medications    lacosamide (VIMPAT) 100 mg Tab    levETIRAcetam (KEPPRA) 750 MG Tab    lamoTRIgine (LAMICTAL) 200 MG tablet     I spent a total of 31 minutes preparing to see the patient, obtaining and reviewing history and results, performing a medically appropriate exam, counseling and educating the patient/family/caregiver, documenting clinical information, coordinating care, and ordering medications, tests, procedures,  and referrals.    I completed education on seizure first aid and safety. I recommended seizure precautions with regards to avoiding unsupervised water recreational activity or bathing in tubs, climbing or working at heights, operation of heavy or dangerous machinery, caution around fire and sources of high heat, as well as any other activity which could put a patient at danger in case of a seizure.  I also reviewed the [facility] law and recommended that the patient not drive  for 6 months in the event of breakthrough seizures.    Stephon Ball MD  [facility]   Department of Neurology    Patient note was created using MModal Dictation.  Any errors in syntax or even information may not have been identified and edited on initial review prior to signing this note.  Subjective:     HPI:   Mr. Darren Nicolas is a 43 y.o. male presenting in follow-up for epilepsy.  Since patient's last visit he completed in EMU stay with seizure captured with left hemispheric onset and secondary generalization noted.  Patient was discharged home with addition of Vimpat.  Patient describes side effects of cognitive fogginess and no change in seizure frequency with Vimpat.  He denies any seizure with generalization but continues to believe he experiences focal dyscognitive seizures 1st thing in the morning.  He would like to prioritize being on only 2 medications.    Seizure Type: Focal onset  Seizure Etiology:  Unknown  Current AEDs: Keppra 1500 mg BID, Lamictal 300 mg morning and 500 mg night BID, Vimpat 100 mg BID    The patient is not accompanied by family who contribute to the history. This patient has 2 types of seizure as described below. The patient reports having seizures for years The patient reports to have stable seizure control. The seizure frequency is variable. The last seizure was 1 week ago . The patient does report side effects from seizure medication.     Seizure Type 1:   Seizure Description: Confusional  episode, garbled speech, wanders with nonpurposeful movements lasting for several minutes with several hours of postictal confusion  Aura: None  Associated Symptoms:  tongue biting and incontinence  Seizure Frequency: Variable, every 4-6 weeks  Last seizure: Yesterday    Seizure Type 2:   Seizure Description: GTC (preceded by seizure type 1) with postictal fatigue  Aura: None  Associated Symptoms:  tongue biting and incontinence  Seizure Frequency: At least 7 in lifetime.   Last seizure: September 2022    Handedness: right  Seizure/ Epilepsy Risk Factors: none  Birth/Developmental History: normal birth history and Normal developmental history  Seizure Triggers/ Provoking Features: missed medications  Previous Seizure Medications: lacosamide (Vimpat, LCS), lamotrigine (Lamictal, LTG) and levetiracetam (Keppra, LEV)  Recent Med Changes: None  Other Treatments: None  Prior Episodes of Status: None  Psychiatric/Behavioral Comorbitidies: Routine MJ use  Surgical Candidacy:  S/p L hemispherpic resection in 2015, currently poor candidate due to EtOH    Prior Studies:  EEG : 9/23- 1 SZ with left hemispheric onset, stay terminated early due to EtOH use/withdrawal risk  vEEG/ EMU evaluation: Not done  MRI of brain: Not done  AED levels:  LEV 28.2 5/2019  CT/CTA Scan:  Not done  PET Scan: Not done  Neuropsychological Evaluation: Not done  DEXA Scan: Not done  Other studies: Not done    PAST MEDICAL HISTORY:  Past Medical History:   Diagnosis Date    Seizure      PAST SURGICAL HISTORY:  No past surgical history on file.    CURRENT MEDS:  Current Outpatient Medications   Medication Sig Dispense Refill    clobetasol 0.05% (TEMOVATE) 0.05 % Oint AAA hands and feet bid 60 g 3    lacosamide (VIMPAT) 100 mg Tab Take 1 tablet (100 mg total) by mouth every 12 (twelve) hours. 180 tablet 1    lamoTRIgine (LAMICTAL) 200 MG tablet Take 1 tablet (200 mg total) by mouth once daily AND 3 tablets (600 mg total) every evening. 360 tablet 3     levETIRAcetam (KEPPRA) 750 MG Tab Take 2 tablets (1,500 mg total) by mouth 2 (two) times daily. 360 tablet 3    midazolam 5 mg/spray (0.1 mL) Spry Administer 1 spray in one nostril as needed for rescue of seizure cluster or prolonged seizure. After 10 minutes if seizure activity continues, an additional spray can be administered in other nostril. 2 each 1     No current facility-administered medications for this visit.     ALLERGIES:  Review of patient's allergies indicates:  No Known Allergies    FAMILY HISTORY:  Family History   Problem Relation Age of Onset    Melanoma Neg Hx      SOCIAL HISTORY:  Social History     Tobacco Use    Smoking status: Former     Current packs/day: 0.00     Types: Cigarettes     Quit date: 2022     Years since quittin.4    Smokeless tobacco: Never   Substance Use Topics    Alcohol use: Yes     Comment: Daily Beer Drinker    Drug use: Never     Review of Systems:  12 system review of systems is negative except for the symptoms mentioned in HPI.      Objective:     There were no vitals filed for this visit.    General: NAD, well nourished   Eyes: no tearing, discharge, no erythema   ENT: moist mucous membranes of the oral cavity, nares patent    Neck: Supple, full range of motion  Cardiovascular: Warm and well perfused, pulses equal and symmetrical  Lungs: Normal work of breathing, normal chest wall excursions  Skin: No rash, lesions, or breakdown on exposed skin  Psychiatry: Mood and affect are appropriate   Abdomen: soft, non tender, non distended  Extremeties: No cyanosis, clubbing or edema.    Neurological   MENTAL STATUS: Alert and oriented to person, place, and time. Attention and concentration within normal limits. Speech without dysarthria, able to name and repeat without difficulty. Recent and remote memory within normal limits   CRANIAL NERVES: Visual fields intact. PERRL. EOMI. Facial sensation intact. Face symmetrical. Hearing grossly intact. Full shoulder shrug  bilaterally. Tongue protrudes midline   SENSORY: Sensation is intact to light touch throughout.   MOTOR: Normal bulk and tone.  5/5 deltoid, biceps, triceps, interosseous, hand  bilaterally. 5/5 iliopsoas, knee extension/flexion, foot dorsi/plantarflexion bilaterally.    REFLEXES: Symmetric and 1+ throughout.   CEREBELLAR/COORDINATION/GAIT: Gait steady in ortho walking boot.  Normal rapid alternating movements.

## 2024-02-11 ENCOUNTER — HOSPITAL ENCOUNTER (EMERGENCY)
Facility: HOSPITAL | Age: 44
Discharge: HOME | End: 2024-02-11
Attending: EMERGENCY MEDICINE
Payer: COMMERCIAL

## 2024-02-11 VITALS
BODY MASS INDEX: 19.89 KG/M2 | HEIGHT: 75 IN | HEART RATE: 66 BPM | RESPIRATION RATE: 18 BRPM | SYSTOLIC BLOOD PRESSURE: 113 MMHG | OXYGEN SATURATION: 96 % | DIASTOLIC BLOOD PRESSURE: 64 MMHG | TEMPERATURE: 98 F | WEIGHT: 160 LBS

## 2024-02-11 DIAGNOSIS — R56.9 SEIZURE: Primary | ICD-10-CM

## 2024-02-11 DIAGNOSIS — G40.909 SEIZURE DISORDER: ICD-10-CM

## 2024-02-11 DIAGNOSIS — G40.219 PARTIAL SYMPTOMATIC EPILEPSY WITH COMPLEX PARTIAL SEIZURES, INTRACTABLE, WITHOUT STATUS EPILEPTICUS: ICD-10-CM

## 2024-02-11 LAB
ALBUMIN SERPL BCP-MCNC: 3.8 G/DL (ref 3.5–5.2)
ALP SERPL-CCNC: 52 U/L (ref 55–135)
ALT SERPL W/O P-5'-P-CCNC: 26 U/L (ref 10–44)
AMPHET+METHAMPHET UR QL: NEGATIVE
ANION GAP SERPL CALC-SCNC: 14 MMOL/L (ref 8–16)
AST SERPL-CCNC: 36 U/L (ref 10–40)
BARBITURATES UR QL SCN>200 NG/ML: NEGATIVE
BASOPHILS # BLD AUTO: 0.04 K/UL (ref 0–0.2)
BASOPHILS NFR BLD: 0.5 % (ref 0–1.9)
BENZODIAZ UR QL SCN>200 NG/ML: ABNORMAL
BILIRUB SERPL-MCNC: 0.4 MG/DL (ref 0.1–1)
BILIRUB UR QL STRIP: NEGATIVE
BNP SERPL-MCNC: 46 PG/ML (ref 0–99)
BUN SERPL-MCNC: 13 MG/DL (ref 6–20)
BZE UR QL SCN: NEGATIVE
CALCIUM SERPL-MCNC: 8.9 MG/DL (ref 8.7–10.5)
CANNABINOIDS UR QL SCN: ABNORMAL
CHLORIDE SERPL-SCNC: 105 MMOL/L (ref 95–110)
CK SERPL-CCNC: 548 U/L (ref 20–200)
CLARITY UR: CLEAR
CO2 SERPL-SCNC: 19 MMOL/L (ref 23–29)
COLOR UR: YELLOW
CREAT SERPL-MCNC: 0.8 MG/DL (ref 0.5–1.4)
CREAT UR-MCNC: 51 MG/DL (ref 23–375)
DIFFERENTIAL METHOD BLD: ABNORMAL
EOSINOPHIL # BLD AUTO: 0.1 K/UL (ref 0–0.5)
EOSINOPHIL NFR BLD: 1.4 % (ref 0–8)
ERYTHROCYTE [DISTWIDTH] IN BLOOD BY AUTOMATED COUNT: 12.4 % (ref 11.5–14.5)
EST. GFR  (NO RACE VARIABLE): >60 ML/MIN/1.73 M^2
GLUCOSE SERPL-MCNC: 84 MG/DL (ref 70–110)
GLUCOSE UR QL STRIP: NEGATIVE
HCT VFR BLD AUTO: 40.6 % (ref 40–54)
HGB BLD-MCNC: 13.2 G/DL (ref 14–18)
HGB UR QL STRIP: NEGATIVE
IMM GRANULOCYTES # BLD AUTO: 0.02 K/UL (ref 0–0.04)
IMM GRANULOCYTES NFR BLD AUTO: 0.2 % (ref 0–0.5)
KETONES UR QL STRIP: NEGATIVE
LACTATE SERPL-SCNC: 2.4 MMOL/L (ref 0.5–2.2)
LEUKOCYTE ESTERASE UR QL STRIP: NEGATIVE
LYMPHOCYTES # BLD AUTO: 1.4 K/UL (ref 1–4.8)
LYMPHOCYTES NFR BLD: 16.7 % (ref 18–48)
MCH RBC QN AUTO: 32.4 PG (ref 27–31)
MCHC RBC AUTO-ENTMCNC: 32.5 G/DL (ref 32–36)
MCV RBC AUTO: 100 FL (ref 82–98)
METHADONE UR QL SCN>300 NG/ML: NEGATIVE
MONOCYTES # BLD AUTO: 0.9 K/UL (ref 0.3–1)
MONOCYTES NFR BLD: 10.9 % (ref 4–15)
NEUTROPHILS # BLD AUTO: 6 K/UL (ref 1.8–7.7)
NEUTROPHILS NFR BLD: 70.3 % (ref 38–73)
NITRITE UR QL STRIP: NEGATIVE
NRBC BLD-RTO: 0 /100 WBC
OPIATES UR QL SCN: NEGATIVE
PCP UR QL SCN>25 NG/ML: NEGATIVE
PH UR STRIP: 8 [PH] (ref 5–8)
PLATELET # BLD AUTO: 214 K/UL (ref 150–450)
PMV BLD AUTO: 9.2 FL (ref 9.2–12.9)
POCT GLUCOSE: 70 MG/DL (ref 70–110)
POTASSIUM SERPL-SCNC: 4.3 MMOL/L (ref 3.5–5.1)
PROT SERPL-MCNC: 6.2 G/DL (ref 6–8.4)
PROT UR QL STRIP: NEGATIVE
RBC # BLD AUTO: 4.08 M/UL (ref 4.6–6.2)
SODIUM SERPL-SCNC: 138 MMOL/L (ref 136–145)
SP GR UR STRIP: 1.01 (ref 1–1.03)
TOXICOLOGY INFORMATION: ABNORMAL
TROPONIN I SERPL DL<=0.01 NG/ML-MCNC: <0.006 NG/ML (ref 0–0.03)
URN SPEC COLLECT METH UR: NORMAL
UROBILINOGEN UR STRIP-ACNC: NEGATIVE EU/DL
WBC # BLD AUTO: 8.46 K/UL (ref 3.9–12.7)

## 2024-02-11 PROCEDURE — 96374 THER/PROPH/DIAG INJ IV PUSH: CPT

## 2024-02-11 PROCEDURE — 80307 DRUG TEST PRSMV CHEM ANLYZR: CPT | Performed by: EMERGENCY MEDICINE

## 2024-02-11 PROCEDURE — 81003 URINALYSIS AUTO W/O SCOPE: CPT | Performed by: EMERGENCY MEDICINE

## 2024-02-11 PROCEDURE — 80175 DRUG SCREEN QUAN LAMOTRIGINE: CPT | Performed by: EMERGENCY MEDICINE

## 2024-02-11 PROCEDURE — 96361 HYDRATE IV INFUSION ADD-ON: CPT

## 2024-02-11 PROCEDURE — 82550 ASSAY OF CK (CPK): CPT | Performed by: EMERGENCY MEDICINE

## 2024-02-11 PROCEDURE — 85025 COMPLETE CBC W/AUTO DIFF WBC: CPT | Performed by: EMERGENCY MEDICINE

## 2024-02-11 PROCEDURE — 99285 EMERGENCY DEPT VISIT HI MDM: CPT | Mod: 25

## 2024-02-11 PROCEDURE — 83880 ASSAY OF NATRIURETIC PEPTIDE: CPT | Performed by: EMERGENCY MEDICINE

## 2024-02-11 PROCEDURE — 25000003 PHARM REV CODE 250: Performed by: EMERGENCY MEDICINE

## 2024-02-11 PROCEDURE — 93010 ELECTROCARDIOGRAM REPORT: CPT | Mod: ,,, | Performed by: INTERNAL MEDICINE

## 2024-02-11 PROCEDURE — 82962 GLUCOSE BLOOD TEST: CPT

## 2024-02-11 PROCEDURE — 80177 DRUG SCRN QUAN LEVETIRACETAM: CPT | Performed by: EMERGENCY MEDICINE

## 2024-02-11 PROCEDURE — 80235 DRUG ASSAY LACOSAMIDE: CPT | Performed by: EMERGENCY MEDICINE

## 2024-02-11 PROCEDURE — 83605 ASSAY OF LACTIC ACID: CPT | Performed by: EMERGENCY MEDICINE

## 2024-02-11 PROCEDURE — 93005 ELECTROCARDIOGRAM TRACING: CPT

## 2024-02-11 PROCEDURE — 63600175 PHARM REV CODE 636 W HCPCS: Performed by: EMERGENCY MEDICINE

## 2024-02-11 PROCEDURE — 84484 ASSAY OF TROPONIN QUANT: CPT | Performed by: EMERGENCY MEDICINE

## 2024-02-11 PROCEDURE — 80053 COMPREHEN METABOLIC PANEL: CPT | Performed by: EMERGENCY MEDICINE

## 2024-02-11 RX ORDER — LAMOTRIGINE 150 MG/1
600 TABLET ORAL
Status: COMPLETED | OUTPATIENT
Start: 2024-02-11 | End: 2024-02-11

## 2024-02-11 RX ORDER — LEVETIRACETAM 500 MG/1
1500 TABLET ORAL
Status: COMPLETED | OUTPATIENT
Start: 2024-02-11 | End: 2024-02-11

## 2024-02-11 RX ORDER — LACOSAMIDE 100 MG/1
150 TABLET ORAL EVERY 12 HOURS
Qty: 90 TABLET | Refills: 0 | Status: SHIPPED | OUTPATIENT
Start: 2024-02-11 | End: 2024-03-12

## 2024-02-11 RX ORDER — KETOROLAC TROMETHAMINE 30 MG/ML
15 INJECTION, SOLUTION INTRAMUSCULAR; INTRAVENOUS
Status: COMPLETED | OUTPATIENT
Start: 2024-02-11 | End: 2024-02-11

## 2024-02-11 RX ORDER — LACOSAMIDE 50 MG/1
100 TABLET ORAL
Status: COMPLETED | OUTPATIENT
Start: 2024-02-11 | End: 2024-02-11

## 2024-02-11 RX ADMIN — SODIUM CHLORIDE 1000 ML: 9 INJECTION, SOLUTION INTRAVENOUS at 07:02

## 2024-02-11 RX ADMIN — KETOROLAC TROMETHAMINE 15 MG: 30 INJECTION, SOLUTION INTRAMUSCULAR; INTRAVENOUS at 07:02

## 2024-02-11 RX ADMIN — LAMOTRIGINE 600 MG: 150 TABLET ORAL at 07:02

## 2024-02-11 RX ADMIN — LEVETIRACETAM 1500 MG: 500 TABLET, FILM COATED ORAL at 07:02

## 2024-02-11 RX ADMIN — LACOSAMIDE 100 MG: 50 TABLET, FILM COATED ORAL at 07:02
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
## 2024-02-12 NOTE — DISCHARGE INSTRUCTIONS
You were seen in the emergency department after a seizure.  We observed you and you returned to baseline.  Your head CT was normal.  Your labs are reassuring. We think you're safe to go home.  Do not take tramadol as it can cause seizures.  Do not drink, drive, operate heavy machinery, swim, watch children unattended, works at heights until you are seen by a neurologist and cleared to return to these activities. Please return for any new seizures, numbness, weakness, severe headache, or other new or worsening concerns.    The CT scan of your chest showed a slightly irregular area at the top part of your lung.  We do not think it is related to why you had a seizure today.  However, we recommend you follow-up with your primary doctor and have a chest CT performed as an outpatient soon.

## 2024-02-14 LAB
LACOSAMIDE: 2.9 MCG/ML (ref 1–10)
LAMOTRIGINE SERPL-MCNC: 6.6 UG/ML (ref 2–15)
LEVETIRACETAM SERPL-MCNC: 13.2 UG/ML (ref 3–60)
OHS QRS DURATION: 108 MS
OHS QTC CALCULATION: 449 MS

## 2024-02-26 ENCOUNTER — HOSPITAL ENCOUNTER (EMERGENCY)
Facility: HOSPITAL | Age: 44
Discharge: HOME | End: 2024-02-26
Attending: STUDENT IN AN ORGANIZED HEALTH CARE EDUCATION/TRAINING PROGRAM
Payer: COMMERCIAL

## 2024-02-26 VITALS
TEMPERATURE: 98 F | WEIGHT: 170 LBS | BODY MASS INDEX: 21.25 KG/M2 | DIASTOLIC BLOOD PRESSURE: 64 MMHG | SYSTOLIC BLOOD PRESSURE: 126 MMHG | HEART RATE: 72 BPM | RESPIRATION RATE: 15 BRPM | OXYGEN SATURATION: 98 %

## 2024-02-26 DIAGNOSIS — R56.9 SEIZURE: ICD-10-CM

## 2024-02-26 DIAGNOSIS — R29.6 UNWITNESSED FALL: Primary | ICD-10-CM

## 2024-02-26 LAB
ABO + RH BLD: NORMAL
ALBUMIN SERPL BCP-MCNC: 3.9 G/DL (ref 3.5–5.2)
ALP SERPL-CCNC: 64 U/L (ref 55–135)
ALT SERPL W/O P-5'-P-CCNC: 24 U/L (ref 10–44)
ANION GAP SERPL CALC-SCNC: 11 MMOL/L (ref 8–16)
AST SERPL-CCNC: 31 U/L (ref 10–40)
BASOPHILS # BLD AUTO: 0.06 K/UL (ref 0–0.2)
BASOPHILS NFR BLD: 0.6 % (ref 0–1.9)
BILIRUB SERPL-MCNC: 0.3 MG/DL (ref 0.1–1)
BLD GP AB SCN CELLS X3 SERPL QL: NORMAL
BUN SERPL-MCNC: 19 MG/DL (ref 6–20)
CALCIUM SERPL-MCNC: 9 MG/DL (ref 8.7–10.5)
CHLORIDE SERPL-SCNC: 108 MMOL/L (ref 95–110)
CK SERPL-CCNC: 305 U/L (ref 20–200)
CO2 SERPL-SCNC: 20 MMOL/L (ref 23–29)
CREAT SERPL-MCNC: 0.9 MG/DL (ref 0.5–1.4)
DIFFERENTIAL METHOD BLD: ABNORMAL
EOSINOPHIL # BLD AUTO: 0.1 K/UL (ref 0–0.5)
EOSINOPHIL NFR BLD: 1.2 % (ref 0–8)
ERYTHROCYTE [DISTWIDTH] IN BLOOD BY AUTOMATED COUNT: 12.3 % (ref 11.5–14.5)
EST. GFR  (NO RACE VARIABLE): >60 ML/MIN/1.73 M^2
ETHANOL SERPL-MCNC: <10 MG/DL
GLUCOSE SERPL-MCNC: 96 MG/DL (ref 70–110)
HCT VFR BLD AUTO: 39.5 % (ref 40–54)
HGB BLD-MCNC: 13.5 G/DL (ref 14–18)
IMM GRANULOCYTES # BLD AUTO: 0.04 K/UL (ref 0–0.04)
IMM GRANULOCYTES NFR BLD AUTO: 0.4 % (ref 0–0.5)
LACTATE SERPL-SCNC: 2.7 MMOL/L (ref 0.5–2.2)
LYMPHOCYTES # BLD AUTO: 1.6 K/UL (ref 1–4.8)
LYMPHOCYTES NFR BLD: 14.7 % (ref 18–48)
MAGNESIUM SERPL-MCNC: 2.4 MG/DL (ref 1.6–2.6)
MCH RBC QN AUTO: 32.8 PG (ref 27–31)
MCHC RBC AUTO-ENTMCNC: 34.2 G/DL (ref 32–36)
MCV RBC AUTO: 96 FL (ref 82–98)
MONOCYTES # BLD AUTO: 1.1 K/UL (ref 0.3–1)
MONOCYTES NFR BLD: 10.4 % (ref 4–15)
NEUTROPHILS # BLD AUTO: 7.8 K/UL (ref 1.8–7.7)
NEUTROPHILS NFR BLD: 72.7 % (ref 38–73)
NRBC BLD-RTO: 0 /100 WBC
PLATELET # BLD AUTO: 221 K/UL (ref 150–450)
PMV BLD AUTO: 9 FL (ref 9.2–12.9)
POCT GLUCOSE: 94 MG/DL (ref 70–110)
POTASSIUM SERPL-SCNC: 3.9 MMOL/L (ref 3.5–5.1)
PROT SERPL-MCNC: 6.4 G/DL (ref 6–8.4)
RBC # BLD AUTO: 4.11 M/UL (ref 4.6–6.2)
SODIUM SERPL-SCNC: 139 MMOL/L (ref 136–145)
SPECIMEN OUTDATE: NORMAL
WBC # BLD AUTO: 10.68 K/UL (ref 3.9–12.7)

## 2024-02-26 PROCEDURE — 82077 ASSAY SPEC XCP UR&BREATH IA: CPT | Performed by: STUDENT IN AN ORGANIZED HEALTH CARE EDUCATION/TRAINING PROGRAM

## 2024-02-26 PROCEDURE — 86901 BLOOD TYPING SEROLOGIC RH(D): CPT | Performed by: STUDENT IN AN ORGANIZED HEALTH CARE EDUCATION/TRAINING PROGRAM

## 2024-02-26 PROCEDURE — 80053 COMPREHEN METABOLIC PANEL: CPT | Performed by: STUDENT IN AN ORGANIZED HEALTH CARE EDUCATION/TRAINING PROGRAM

## 2024-02-26 PROCEDURE — 25000003 PHARM REV CODE 250: Performed by: STUDENT IN AN ORGANIZED HEALTH CARE EDUCATION/TRAINING PROGRAM

## 2024-02-26 PROCEDURE — 96375 TX/PRO/DX INJ NEW DRUG ADDON: CPT

## 2024-02-26 PROCEDURE — 85025 COMPLETE CBC W/AUTO DIFF WBC: CPT | Performed by: STUDENT IN AN ORGANIZED HEALTH CARE EDUCATION/TRAINING PROGRAM

## 2024-02-26 PROCEDURE — 99285 EMERGENCY DEPT VISIT HI MDM: CPT | Mod: 25

## 2024-02-26 PROCEDURE — 80177 DRUG SCRN QUAN LEVETIRACETAM: CPT | Performed by: STUDENT IN AN ORGANIZED HEALTH CARE EDUCATION/TRAINING PROGRAM

## 2024-02-26 PROCEDURE — 83735 ASSAY OF MAGNESIUM: CPT | Performed by: STUDENT IN AN ORGANIZED HEALTH CARE EDUCATION/TRAINING PROGRAM

## 2024-02-26 PROCEDURE — 12013 RPR F/E/E/N/L/M 2.6-5.0 CM: CPT

## 2024-02-26 PROCEDURE — 93010 ELECTROCARDIOGRAM REPORT: CPT | Mod: ,,, | Performed by: INTERNAL MEDICINE

## 2024-02-26 PROCEDURE — 82550 ASSAY OF CK (CPK): CPT | Performed by: STUDENT IN AN ORGANIZED HEALTH CARE EDUCATION/TRAINING PROGRAM

## 2024-02-26 PROCEDURE — 83605 ASSAY OF LACTIC ACID: CPT | Performed by: STUDENT IN AN ORGANIZED HEALTH CARE EDUCATION/TRAINING PROGRAM

## 2024-02-26 PROCEDURE — 96374 THER/PROPH/DIAG INJ IV PUSH: CPT

## 2024-02-26 PROCEDURE — 82962 GLUCOSE BLOOD TEST: CPT

## 2024-02-26 PROCEDURE — 96361 HYDRATE IV INFUSION ADD-ON: CPT | Mod: 59

## 2024-02-26 PROCEDURE — 93005 ELECTROCARDIOGRAM TRACING: CPT | Mod: 59

## 2024-02-26 PROCEDURE — 80175 DRUG SCREEN QUAN LAMOTRIGINE: CPT | Performed by: STUDENT IN AN ORGANIZED HEALTH CARE EDUCATION/TRAINING PROGRAM

## 2024-02-26 PROCEDURE — 63600175 PHARM REV CODE 636 W HCPCS: Performed by: STUDENT IN AN ORGANIZED HEALTH CARE EDUCATION/TRAINING PROGRAM

## 2024-02-26 RX ORDER — KETOROLAC TROMETHAMINE 30 MG/ML
15 INJECTION, SOLUTION INTRAMUSCULAR; INTRAVENOUS
Status: COMPLETED | OUTPATIENT
Start: 2024-02-26 | End: 2024-02-26

## 2024-02-26 RX ORDER — LEVETIRACETAM 500 MG/5ML
1500 INJECTION, SOLUTION, CONCENTRATE INTRAVENOUS
Status: COMPLETED | OUTPATIENT
Start: 2024-02-26 | End: 2024-02-26

## 2024-02-26 RX ORDER — LIDOCAINE HYDROCHLORIDE 10 MG/ML
100 INJECTION INFILTRATION; PERINEURAL
Status: COMPLETED | OUTPATIENT
Start: 2024-02-26 | End: 2024-02-26

## 2024-02-26 RX ADMIN — SODIUM CHLORIDE 1000 ML: 9 INJECTION, SOLUTION INTRAVENOUS at 07:02

## 2024-02-26 RX ADMIN — KETOROLAC TROMETHAMINE 15 MG: 30 INJECTION, SOLUTION INTRAMUSCULAR; INTRAVENOUS at 08:02

## 2024-02-26 RX ADMIN — LIDOCAINE HYDROCHLORIDE 100 MG: 10 INJECTION, SOLUTION INFILTRATION; PERINEURAL at 08:02

## 2024-02-26 RX ADMIN — LEVETIRACETAM 1500 MG: 100 INJECTION, SOLUTION INTRAVENOUS at 07:02

## 2024-02-27 LAB
OHS QRS DURATION: 108 MS
OHS QTC CALCULATION: 432 MS

## 2024-02-27 NOTE — ED TRIAGE NOTES
Pt presents to ER after having a witnessed seizure. Pt is unaware of where he was or what happened. Pt admits to having a HA. Pt has facial abrasions and a lac above his left eye. Pt presents to Er with C Collar in place. Pt denies any other issues at this time. Pt was medicate PTA by EMS. Provider at bedside michael further discuss POC>

## 2024-02-27 NOTE — ED PROVIDER NOTES
Encounter Date: 2024    SCRIBE #1 NOTE: I, Alex Melendrez, am scribing for, and in the presence of,  Hernandez Sullivan MD.       History     Chief Complaint   Patient presents with    Seizures     Pt reports to the ED BIB [facility] with C/O possible seizure x 1 today. Per EMS pt was found down in an alley way between some buildings. Pt was combative and has a lac to the left forehead with bleeding controlled at this time. Pt was given 10 mg of Versed by EMS. Pt calm but confused. RESP easy and unlabored. Pt placed in ELOINA bed for eval.      Darren Nicolas is a 43 y.o. male with a PMHx of seizures, Substance abuse, who presents with AMS. Pt altered and unable to provide any hx. History provided by independent historian, EMS, reports patient was initially found down for an unknown duration in an alley by a bystander, and note possible unwitnessed seizure. They endorse upon arrival patient became combative, and was administered 10 mg versed IM as well as placing the patient on restraints. They also report a wound to his left forehead. He was placed in a c-collar en route. No other medications administered PTA. NKDA.        The history is provided by the EMS personnel. The history is limited by the condition of the patient. No  was used.     Review of patient's allergies indicates:  No Known Allergies  Past Medical History:   Diagnosis Date    Seizure      No past surgical history on file.  Family History   Problem Relation Age of Onset    Melanoma Neg Hx      Social History     Tobacco Use    Smoking status: Former     Current packs/day: 0.00     Types: Cigarettes     Quit date: 2022     Years since quittin.6    Smokeless tobacco: Never   Substance Use Topics    Alcohol use: Yes     Comment: Daily Beer Drinker    Drug use: Never     Review of Systems    Physical Exam     Initial Vitals   BP Pulse Resp Temp SpO2   24 1750 24 1750 24 1750 24 175    (!) 140/80 98 20 98.5 °F (36.9 °C) 98 %      MAP       --                Physical Exam    Nursing note and vitals reviewed.  Constitutional: He appears well-developed and well-nourished. He is not diaphoretic. No distress. Cervical collar in place.   HENT:   Head: Normocephalic.   Laceration to the left forehead just above eyebrow, no active bleeding.   Eyes: Conjunctivae and EOM are normal. Pupils are equal, round, and reactive to light.   Neck: Neck supple.   Cardiovascular:  Normal rate, regular rhythm, normal heart sounds and intact distal pulses.           No murmur heard.  Pulmonary/Chest: Breath sounds normal. No respiratory distress. He has no wheezes. He has no rhonchi. He has no rales.   Abdominal: Abdomen is soft. He exhibits no distension. There is no abdominal tenderness. There is no rebound and no guarding.   Musculoskeletal:         General: No tenderness or edema.      Cervical back: Neck supple.     Neurological: He is alert. GCS eye subscore is 4. GCS verbal subscore is 2. GCS motor subscore is 6.   JOSE orientation because can't understand speech. Moving all extremities   Skin: Skin is warm and dry. Capillary refill takes less than 2 seconds.         ED Course   Lac Repair    Date/Time: 2/26/2024 9:31 PM    Performed by: Hernandez Sullivan MD  Authorized by: Hernandez Sullivan MD    Consent:     Consent obtained:  Verbal    Consent given by:  Patient    Risks, benefits, and alternatives were discussed: yes      Risks discussed:  Infection, poor cosmetic result and poor wound healing  Anesthesia:     Anesthesia method:  Local infiltration    Local anesthetic:  Lidocaine 1% w/o epi  Laceration details:     Location:  Face    Face location:  L eyebrow    Length (cm):  3  Pre-procedure details:     Preparation:  Patient was prepped and draped in usual sterile fashion  Exploration:     Hemostasis achieved with:  Direct pressure    Wound exploration: wound explored through full range of motion       Contaminated: no    Treatment:     Area cleansed with:  Saline    Amount of cleaning:  Standard    Irrigation method:  Pressure wash  Skin repair:     Repair method:  Sutures    Suture size:  5-0    Wound skin closure material used: Vicryl.    Suture technique:  Simple interrupted    Number of sutures:  4  Approximation:     Approximation:  Close  Repair type:     Repair type:  Simple  Post-procedure details:     Dressing:  Open (no dressing)    Procedure completion:  Tolerated well, no immediate complications    Labs Reviewed   CK - Abnormal; Notable for the following components:       Result Value     (*)     All other components within normal limits   CBC W/ AUTO DIFFERENTIAL - Abnormal; Notable for the following components:    RBC 4.11 (*)     Hemoglobin 13.5 (*)     Hematocrit 39.5 (*)     MCH 32.8 (*)     MPV 9.0 (*)     Gran # (ANC) 7.8 (*)     Mono # 1.1 (*)     Lymph % 14.7 (*)     All other components within normal limits   COMPREHENSIVE METABOLIC PANEL - Abnormal; Notable for the following components:    CO2 20 (*)     All other components within normal limits   LACTIC ACID, PLASMA - Abnormal; Notable for the following components:    Lactate (Lactic Acid) 2.7 (*)     All other components within normal limits   MAGNESIUM   ALCOHOL,MEDICAL (ETHANOL)   URINALYSIS, REFLEX TO URINE CULTURE   DRUG SCREEN PANEL, URINE EMERGENCY   LAMOTRIGINE LEVEL   LEVETIRACETAM  (KEPPRA) LEVEL   TYPE & SCREEN   POCT GLUCOSE   POCT GLUCOSE MONITORING CONTINUOUS          Imaging Results              CT Head Without Contrast (Final result)  Result time 02/26/24 19:18:30      Final result by Maidson Hager MD (02/26/24 19:18:30)                   Impression:      No acute intracranial abnormalities identified.      Electronically signed by: Madison Hager MD  Date:    02/26/2024  Time:    19:18               Narrative:    EXAMINATION:  CT HEAD WITHOUT CONTRAST    CLINICAL HISTORY:  Trauma;    TECHNIQUE:  Low dose axial  images were obtained through the head.  Coronal and sagittal reformations were also performed. Contrast was not administered.    COMPARISON:  Recent CT head from 02/11/2024.    FINDINGS:  Postsurgical left-sided craniotomy changes are seen.  No evidence of acute/recent major vascular distribution cerebral infarction, intraparenchymal hemorrhage, or intra-axial space occupying lesion. The ventricular system is normal in size and configuration with no evidence of hydrocephalus. No effacement of the skull-base cisterns. No abnormal extra-axial fluid collections or blood products. Visualized paranasal sinuses and mastoid air cells are clear.                                       CT Cervical Spine Without Contrast (Final result)  Result time 02/26/24 19:25:05      Final result by Madison Hager MD (02/26/24 19:25:05)                   Impression:      No evidence of acute cervical spine fracture or dislocation.      Electronically signed by: Madison Hager MD  Date:    02/26/2024  Time:    19:25               Narrative:    EXAMINATION:  CT CERVICAL SPINE WITHOUT CONTRAST    CLINICAL HISTORY:  Neck trauma, intoxicated or obtunded (Age >= 16y);Trauma;    TECHNIQUE:  Low dose axial images, sagittal and coronal reformations were performed though the cervical spine.  Contrast was not administered.    COMPARISON:  Recent CT cervical spine from 02/11/2024.    FINDINGS:  No evidence of acute cervical spine fracture or dislocation.  Odontoid process is intact.  Craniocervical junction is unremarkable.  Cervical spine alignment is within normal limits.  Stable mild degenerative changes are seen at the C6-7 level.    Surrounding soft tissues show no significant abnormalities.  Airway is patent.  Mild emphysematous changes are seen in the visualized lung apices with redemonstration of right apical irregular opacity or scarring.                                       Medications   sodium chloride 0.9% bolus 1,000 mL 1,000 mL  (1,000 mLs Intravenous New Bag 2/26/24 1922)   levETIRAcetam injection 1,500 mg (1,500 mg Intravenous Given 2/26/24 1900)   LIDOcaine HCL 10 mg/ml (1%) injection 100 mg (100 mg Intradermal Given by Provider 2/26/24 2006)   ketorolac injection 15 mg (15 mg Intravenous Given 2/26/24 2006)     Medical Decision Making  Differential diagnoses considered includes ICH/SAH, seizure, postictal state, electrolyte abnormality, GEORGE, laceration, cervical fracture    We will load with Keppra given suspicion for seizure and will also give 1 L normal saline.  See ED course for additional information.    Amount and/or Complexity of Data Reviewed  External Data Reviewed: notes.     Details: External documents reviewed: He was last seen by neurology on 12/13/23, during which he had admitted to EtOH use in his prior hospitalization, which limited his candidacy for surgery.  His Lamictal was increased via up taper to 400 mg morning and 600 mg night. During his last ED visit on 02/11/24 where he was seen for seizures, neurology recommended an increase of his vimpat.   Labs: ordered. Decision-making details documented in ED Course.  Radiology: ordered and independent interpretation performed. Decision-making details documented in ED Course.  ECG/medicine tests: ordered and independent interpretation performed. Decision-making details documented in ED Course.    Risk  Prescription drug management.  Diagnosis or treatment significantly limited by social determinants of health.            Scribe Attestation:   Scribe #1: I performed the above scribed service and the documentation accurately describes the services I performed. I attest to the accuracy of the note.        ED Course as of 02/26/24 2132 Mon Feb 26, 2024 1921 CBC auto differential(!)  CBC unremarkable without leukocytosis, significant anemia, or decreased platelets   [BD]   1922 CT Head Without Contrast  CT Head unremarkable without evidence of large-vessel infarct or  intracranial hemorrhage   [BD]   1949 Lactic Acid Level(!): 2.7  Consistent with suspected seizure [BD]   1949 CPK(!): 305  Minimally elevated, inconsistent with rhabdomyolysis [BD]   1949 Alcohol, Serum: <10  Normal [BD]   1949 POCT Glucose: 94  Normal [BD]   1949 Comprehensive metabolic panel(!)  CMP unremarkable without significant electrolyte derangement, impaired renal function, or elevated LFTs   [BD]   1949 CT Cervical Spine Without Contrast  No acute fracture dislocation [BD]   2030 EKG 12-lead  EKG independently interpreted by me shows normal sinus rhythm, rate 70, no STEMI, incomplete right bundle, which is chronic when compared to 02/11/2024 [BD]   2129 Patient's forehead laceration repaired without complication; see procedure note for full details [BD]   2131 Patient is back to his baseline mental status.  He is hemodynamically and clinically stable.  I suspect he had a breakthrough seizure without obvious cause.  He is going to follow up with his neurologist.  He is able to ambulate independently here Patient will be discharged at this time. Patient has been given home care instructions, follow up instructions, and strict return precautions. They agree with and are comfortable with the plan.  [BD]      ED Course User Index  [BD] Hernandez Sullivan MD                         I, Hernandez Sullivan MD, personally performed the services described in this documentation. All medical record entries made by the scribe were at my direction and in my presence. I have reviewed the chart and agree that the record reflects my personal performance and is accurate and complete.    Clinical Impression:  Final diagnoses:  [R29.6] Unwitnessed fall (Primary)  [R56.9] Seizure                 Hernandez Sullivan MD  02/26/24 2133

## 2024-02-27 NOTE — DISCHARGE INSTRUCTIONS
It was a pleasure taking care of you today!     Diagnosis: Seizure    Home Care:     Your sutures will dissolve on their own.    Safety:  - It is [place] law that you do not drive for the next six months as you have had an episode of loss of consciousness.   - Take showers, not baths  - Take care when around bodies of water (swimming pools, lakes, etc) and wear protective equipment. Do not swim alone  - Use equipment with automatic shutoff safety features  - Do not climb ladders or use heavy machinery until seizure-free for 6 months  - Use the back burners when cooking  - If you have children, change diapers on the floor and avoid holding them while taking stairs  - Do not drive until seizure-free for 6 months    During a seizure:  - Ensure the person is in a safe place, remove hard or sharp objects from the area  - Turn the person on their side  - Never force anything into their mouth  - Keep on eye on the time, if the jerking/shaking/tensing of the muscles lasts > 5 minutes, call 911.    After a seizure:  - Allow them to lie quietly, gently call them to see if they wake up  - If there is injury or if they are not waking up within 5 minutes, call 911    Follow-Up Plan:  - Follow-up with primary care doctor within 3 - 5 days to discuss your recent ER visit and any additional concerns that you may have.  - Additional testing and/or evaluation as directed by your primary doctor    Return to the Emergency Department for symptoms including but not limited to: new or seizures different from your typical ones, persistence or worsening of symptoms, shortness of breath or chest pain, inability to drink without vomiting, passing out/fainting/ loss of consciousness, or if you have other concerns.

## 2024-02-29 LAB
LAMOTRIGINE SERPL-MCNC: 5.5 UG/ML (ref 2–15)
LEVETIRACETAM SERPL-MCNC: 11.1 UG/ML (ref 3–60)

## 2024-06-19 ENCOUNTER — PATIENT MESSAGE (OUTPATIENT)
Dept: NEUROLOGY | Facility: CLINIC | Age: 44
End: 2024-06-19
Payer: COMMERCIAL

## 2024-08-05 NOTE — PROGRESS NOTES
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 short-term follow-up left diagnostic mammogram in 6 months to  ensure benign evolution or stability of probably benign left breast  calcifications, likely related to postoperative change. I discussed  results with the patient following the exam.     BI-RADS ASSESSMENT: BI-RADS 3.         Review of Systems:  See interval history.      Medications:    Current Outpatient Medications:     atorvastatin (LIPITOR) 10 MG tablet, Take 1 tablet by mouth Every Night., Disp: , Rfl:     azelastine (ASTELIN) 0.1 % nasal spray, 1 spray As Needed., Disp: , Rfl:     Cannabidiol 100 MG/ML solution, Take 1 mg by mouth Daily., Disp: , Rfl:     Cholecalciferol (Vitamin D3) 1.25 MG (17085 UT) tablet, Take 1 tablet by mouth 2 (Two) Times a Week. NIGHT ON Sunday AND THURSDAY, Disp: , Rfl:     CINNAMON PO, Take 1 tablet by mouth Daily. HOLD ONE WEEK FOR SURGERY, Disp: , Rfl:     dapagliflozin Propanediol (Farxiga) 10 MG tablet, Take 10 mg by mouth Daily., Disp: , Rfl:     letrozole (FEMARA) 2.5 MG tablet, Take 1 tablet by mouth Daily., Disp: 90 tablet, Rfl: 0    lisinopril-hydrochlorothiazide (PRINZIDE,ZESTORETIC) 20-25 MG per tablet, Take 1 tablet by mouth Daily., Disp: , Rfl:     loratadine (CLARITIN) 10 MG tablet, Take 1 tablet by mouth Daily., Disp: ,  seizure precautions ( no baths but showers are ok, no swimming unsupervised, no use of heavy machinery, no use of sharp moving objects, avoid heights).   3. Issues related to pregnancy, OCP and breast feeding as it relates to epilepsy.  4. The potential of teratogenicity and suicidal risks of anti-epileptic medications.  5.Avoid any activity that compromise patient safety related to seizures.     Questions and concerns raised by the patient and family/care-giver(s) were addressed and they indicated understanding of everything discussed and agreed to plans as above.    Return after EMU evaluation or earlier prn    Lyn Walton MD, NADIRA(), FACNS, FAES.  Neurology-Epilepsy.  [facility].                                                   Rfl:     montelukast (SINGULAIR) 10 MG tablet, Take 1 tablet by mouth Every Night., Disp: , Rfl:     Multiple Vitamins-Minerals (Multi Adult Gummies) chewable tablet, Chew 1 tablet Daily. HOLD FOR ONE WEEK FOR SURGERY, Disp: , Rfl:     omeprazole (priLOSEC) 20 MG capsule, 1 capsule every night at bedtime., Disp: , Rfl:     Allergies:  No Known Allergies    Family History   Problem Relation Age of Onset    Hypertension Mother     Irritable bowel syndrome Mother     Aneurysm Mother     Hypertension Father     Heart disease Father     Diabetes Father     No Known Problems Sister     Hypertension Brother     Cancer Brother     Diabetes Brother     Esophageal cancer Brother     No Known Problems Brother     No Known Problems Brother     No Known Problems Brother     No Known Problems Brother     Aneurysm Maternal Aunt     Aneurysm Maternal Uncle     Cancer Maternal Uncle     Cancer Maternal Grandmother     Malig Hyperthermia Neg Hx        Objective   PHYSICAL EXAMINATION:   There were no vitals filed for this visit.  ECOG 0 - Asymptomatic  General: NAD, well appearing  Psych: a&o x3, normal mood and affect  Eyes: EOMI, no scleral icterus  ENMT: neck supple without masses or thyromegaly, mucous membranes moist  MSK: normal gait, normal ROM in bilateral shoulders  Lymph nodes: no cervical, supraclavicular or axillary lymphadenopathy  Breast:   Right: Sp mastopexy with well-healed scars.  There is some periareolar nodularity, likely fat necrosis.  No nipple abnormalities.  Left: Sp mastopexy with well-healed scars.  No masses or nipple abnormalities.      Assessment & Plan   Assessment:   64 y.o. 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 She also had a diagnosis of right breast atypical ductal hyperplasia (ADH) and a left breast intraductal papilloma. She underwent right partial mastectomy with right breast excisional biopsy and left breast excisional biopsy  "with bilateral oncoplastic reduction on 12/19/23 with benign final pathology on the left and pTis on the right.  She declined adjuvant radiation.    Plan:  -Reassured her that the nodular area near her right nipple is likely evolving fat necrosis.  -Advised her to go ahead and start the exemestane.  -Continue follow-up with Dr. Fritz.  -left dx mammo in jan followed by exam     AMRINO Cardenas      CC:  MARINO Wilson        Addendum:  I received records from [facility].  She did undergo DCISionRT testing. \"Score returned as 4.8 which is in the elevated range.  At this time she would like to forego radiation treatment despite results of DCISionRT suggesting a higher risk of recurrence without adjuvant radiation.\"  "

## 2024-09-29 ENCOUNTER — HOSPITAL ENCOUNTER (EMERGENCY)
Facility: HOSPITAL | Age: 44
Discharge: HOME | End: 2024-09-29
Attending: EMERGENCY MEDICINE
Payer: COMMERCIAL

## 2024-09-29 VITALS
HEART RATE: 68 BPM | WEIGHT: 170 LBS | RESPIRATION RATE: 15 BRPM | BODY MASS INDEX: 21.14 KG/M2 | DIASTOLIC BLOOD PRESSURE: 73 MMHG | HEIGHT: 75 IN | OXYGEN SATURATION: 97 % | SYSTOLIC BLOOD PRESSURE: 125 MMHG | TEMPERATURE: 98 F

## 2024-09-29 DIAGNOSIS — M25.519 SHOULDER PAIN: ICD-10-CM

## 2024-09-29 DIAGNOSIS — T07.XXXA ABRASIONS OF MULTIPLE SITES: ICD-10-CM

## 2024-09-29 DIAGNOSIS — R56.9 SEIZURE: Primary | ICD-10-CM

## 2024-09-29 DIAGNOSIS — R40.4 ALTERED LEVEL OF CONSCIOUSNESS: ICD-10-CM

## 2024-09-29 LAB
ALBUMIN SERPL BCP-MCNC: 3.8 G/DL (ref 3.5–5.2)
ALLENS TEST: ABNORMAL
ALLENS TEST: NORMAL
ALP SERPL-CCNC: 67 U/L (ref 55–135)
ALT SERPL W/O P-5'-P-CCNC: 30 U/L (ref 10–44)
ANION GAP SERPL CALC-SCNC: 18 MMOL/L (ref 8–16)
AST SERPL-CCNC: 42 U/L (ref 10–40)
BASOPHILS # BLD AUTO: 0.05 K/UL (ref 0–0.2)
BASOPHILS NFR BLD: 0.5 % (ref 0–1.9)
BILIRUB SERPL-MCNC: 0.5 MG/DL (ref 0.1–1)
BUN SERPL-MCNC: 24 MG/DL (ref 6–20)
CALCIUM SERPL-MCNC: 9.3 MG/DL (ref 8.7–10.5)
CHLORIDE SERPL-SCNC: 108 MMOL/L (ref 95–110)
CK SERPL-CCNC: 319 U/L (ref 20–200)
CO2 SERPL-SCNC: 15 MMOL/L (ref 23–29)
CREAT SERPL-MCNC: 1.3 MG/DL (ref 0.5–1.4)
DIFFERENTIAL METHOD BLD: ABNORMAL
EOSINOPHIL # BLD AUTO: 0.2 K/UL (ref 0–0.5)
EOSINOPHIL NFR BLD: 1.9 % (ref 0–8)
ERYTHROCYTE [DISTWIDTH] IN BLOOD BY AUTOMATED COUNT: 12.3 % (ref 11.5–14.5)
EST. GFR  (NO RACE VARIABLE): >60 ML/MIN/1.73 M^2
ETHANOL SERPL-MCNC: <10 MG/DL
GLUCOSE SERPL-MCNC: 75 MG/DL (ref 70–110)
HCT VFR BLD AUTO: 42.1 % (ref 40–54)
HGB BLD-MCNC: 14 G/DL (ref 14–18)
IMM GRANULOCYTES # BLD AUTO: 0.04 K/UL (ref 0–0.04)
IMM GRANULOCYTES NFR BLD AUTO: 0.4 % (ref 0–0.5)
LDH SERPL L TO P-CCNC: 0.87 MMOL/L (ref 0.5–2.2)
LDH SERPL L TO P-CCNC: 7.57 MMOL/L (ref 0.5–2.2)
LYMPHOCYTES # BLD AUTO: 1.8 K/UL (ref 1–4.8)
LYMPHOCYTES NFR BLD: 20 % (ref 18–48)
MAGNESIUM SERPL-MCNC: 2.2 MG/DL (ref 1.6–2.6)
MCH RBC QN AUTO: 33.7 PG (ref 27–31)
MCHC RBC AUTO-ENTMCNC: 33.3 G/DL (ref 32–36)
MCV RBC AUTO: 101 FL (ref 82–98)
MONOCYTES # BLD AUTO: 1.2 K/UL (ref 0.3–1)
MONOCYTES NFR BLD: 13.5 % (ref 4–15)
NEUTROPHILS # BLD AUTO: 5.8 K/UL (ref 1.8–7.7)
NEUTROPHILS NFR BLD: 63.7 % (ref 38–73)
NRBC BLD-RTO: 0 /100 WBC
PLATELET # BLD AUTO: 256 K/UL (ref 150–450)
PMV BLD AUTO: 8.9 FL (ref 9.2–12.9)
POTASSIUM SERPL-SCNC: 4.1 MMOL/L (ref 3.5–5.1)
PROT SERPL-MCNC: 6.4 G/DL (ref 6–8.4)
RBC # BLD AUTO: 4.15 M/UL (ref 4.6–6.2)
SAMPLE: ABNORMAL
SAMPLE: NORMAL
SITE: ABNORMAL
SITE: NORMAL
SODIUM SERPL-SCNC: 141 MMOL/L (ref 136–145)
WBC # BLD AUTO: 9.1 K/UL (ref 3.9–12.7)

## 2024-09-29 PROCEDURE — 96374 THER/PROPH/DIAG INJ IV PUSH: CPT

## 2024-09-29 PROCEDURE — 80177 DRUG SCRN QUAN LEVETIRACETAM: CPT | Performed by: EMERGENCY MEDICINE

## 2024-09-29 PROCEDURE — 63600175 PHARM REV CODE 636 W HCPCS: Performed by: EMERGENCY MEDICINE

## 2024-09-29 PROCEDURE — 99284 EMERGENCY DEPT VISIT MOD MDM: CPT | Mod: 25

## 2024-09-29 PROCEDURE — 25000003 PHARM REV CODE 250: Performed by: EMERGENCY MEDICINE

## 2024-09-29 PROCEDURE — 83605 ASSAY OF LACTIC ACID: CPT

## 2024-09-29 PROCEDURE — 82550 ASSAY OF CK (CPK): CPT | Performed by: EMERGENCY MEDICINE

## 2024-09-29 PROCEDURE — 82077 ASSAY SPEC XCP UR&BREATH IA: CPT | Performed by: EMERGENCY MEDICINE

## 2024-09-29 PROCEDURE — 85025 COMPLETE CBC W/AUTO DIFF WBC: CPT | Performed by: EMERGENCY MEDICINE

## 2024-09-29 PROCEDURE — 93005 ELECTROCARDIOGRAM TRACING: CPT

## 2024-09-29 PROCEDURE — 80175 DRUG SCREEN QUAN LAMOTRIGINE: CPT | Performed by: EMERGENCY MEDICINE

## 2024-09-29 PROCEDURE — 80053 COMPREHEN METABOLIC PANEL: CPT | Performed by: EMERGENCY MEDICINE

## 2024-09-29 PROCEDURE — 96361 HYDRATE IV INFUSION ADD-ON: CPT

## 2024-09-29 PROCEDURE — 99900035 HC TECH TIME PER 15 MIN (STAT)

## 2024-09-29 PROCEDURE — 80235 DRUG ASSAY LACOSAMIDE: CPT | Performed by: EMERGENCY MEDICINE

## 2024-09-29 PROCEDURE — 93010 ELECTROCARDIOGRAM REPORT: CPT | Mod: ,,, | Performed by: INTERNAL MEDICINE

## 2024-09-29 PROCEDURE — 83735 ASSAY OF MAGNESIUM: CPT | Performed by: EMERGENCY MEDICINE

## 2024-09-29 RX ORDER — LEVETIRACETAM 500 MG/1
1500 TABLET ORAL
Status: COMPLETED | OUTPATIENT
Start: 2024-09-29 | End: 2024-09-29

## 2024-09-29 RX ORDER — KETOROLAC TROMETHAMINE 30 MG/ML
15 INJECTION, SOLUTION INTRAMUSCULAR; INTRAVENOUS
Status: COMPLETED | OUTPATIENT
Start: 2024-09-29 | End: 2024-09-29

## 2024-09-29 RX ORDER — LAMOTRIGINE 150 MG/1
300 TABLET ORAL
Status: COMPLETED | OUTPATIENT
Start: 2024-09-29 | End: 2024-09-29

## 2024-09-29 RX ADMIN — KETOROLAC TROMETHAMINE 15 MG: 30 INJECTION, SOLUTION INTRAMUSCULAR at 06:09

## 2024-09-29 RX ADMIN — LEVETIRACETAM 1500 MG: 500 TABLET, FILM COATED ORAL at 06:09

## 2024-09-29 RX ADMIN — SODIUM CHLORIDE 1000 ML: 9 INJECTION, SOLUTION INTRAVENOUS at 06:09

## 2024-09-29 RX ADMIN — SODIUM CHLORIDE 1000 ML: 9 INJECTION, SOLUTION INTRAVENOUS at 05:09

## 2024-09-29 RX ADMIN — LAMOTRIGINE 300 MG: 150 TABLET ORAL at 06:09
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
## 2024-09-30 NOTE — ED NOTES
Patient provided specimen collection urinal and made aware of need for urine sample. Understanding verbalized. Patient will alert nursing staff when sample able to be provided.

## 2024-09-30 NOTE — ED NOTES
Received report from Latisha. Pt c/o pain on right shoulder and right bicep, abrasion noticed on right knee. Bilateral upper extremity  weak. Pt lethargic AAOx4. Pt in C collar, resting comfortably in bed, in no apparent distress. HOB elevated to 20 degrees, SR up x2, seizure precautions maintained, call bell within reach. Wife stepped out for a minute.

## 2024-09-30 NOTE — DISCHARGE INSTRUCTIONS
Thank you for coming to our Emergency Department today. It is important to remember that some problems are difficult to diagnose and may not be found during your Emergency Department visit. Be sure to follow up with your primary care doctor and review all labs/imaging/tests that were performed during this visit with them. Some labs/tests may be outside of the normal range and require non-emergent follow-up and further investigation to help diagnose/exclude/prevent complications or other medical conditions.    If you do not have a primary care doctor, you may contact the one listed on your discharge paperwork or you may also call the [facility] Appointment Desk at [phone] to schedule an appointment and establish care with one. It is important to your health that you have a primary care doctor.    Medicaid Escalation Line:   [phone] - Please contact this number if you are having difficulty getting follow up with a Primary Care Provider or Speciality Provider.     Please take all medications as directed. All medications may potentially have side-effects and it is impossible to predict which medications may give you side-effects or what side-effects (if any) they will give you.. If you feel that you are having a negative effect or side-effect of any medication you should immediately stop taking them and seek medical attention. If you feel that you are having a life-threatening reaction call [phone].    Return to the ER with any questions/concerns, new/concerning symptoms, worsening or failure to improve.     Do not drive, swim, climb to height, take a bath or make any important decisions for 24 hours if you have received any pain medications, sedatives or mood altering drugs during your ER visit.

## 2024-10-01 LAB
OHS QRS DURATION: 106 MS
OHS QTC CALCULATION: 435 MS

## 2024-10-02 LAB
LACOSAMIDE: <0.5 MCG/ML (ref 1–10)
LEVETIRACETAM SERPL-MCNC: 17.9 UG/ML (ref 3–60)

## 2024-10-03 LAB — LAMOTRIGINE SERPL-MCNC: 9.4 UG/ML (ref 2–15)

## 2024-11-14 DIAGNOSIS — G40.219 PARTIAL SYMPTOMATIC EPILEPSY WITH COMPLEX PARTIAL SEIZURES, INTRACTABLE, WITHOUT STATUS EPILEPTICUS: ICD-10-CM

## 2024-11-14 RX ORDER — LAMOTRIGINE 200 MG/1
TABLET ORAL
Qty: 360 TABLET | Refills: 3 | Status: SHIPPED | OUTPATIENT
Start: 2024-11-14 | End: 2025-11-09

## 2025-01-15 DIAGNOSIS — G40.219 PARTIAL SYMPTOMATIC EPILEPSY WITH COMPLEX PARTIAL SEIZURES, INTRACTABLE, WITHOUT STATUS EPILEPTICUS: ICD-10-CM

## 2025-01-15 RX ORDER — LEVETIRACETAM 750 MG/1
1500 TABLET ORAL 2 TIMES DAILY
Qty: 360 TABLET | Refills: 3 | Status: SHIPPED | OUTPATIENT
Start: 2025-01-15

## 2025-01-15 RX ORDER — LEVETIRACETAM 750 MG/1
1500 TABLET ORAL 2 TIMES DAILY
Qty: 360 TABLET | Refills: 3 | OUTPATIENT
Start: 2025-01-15

## 2025-05-27 ENCOUNTER — OFFICE VISIT (OUTPATIENT)
Dept: NEUROLOGY | Facility: CLINIC | Age: 45
End: 2025-05-27
Payer: COMMERCIAL

## 2025-05-27 DIAGNOSIS — G40.219 PARTIAL SYMPTOMATIC EPILEPSY WITH COMPLEX PARTIAL SEIZURES, INTRACTABLE, WITHOUT STATUS EPILEPTICUS: ICD-10-CM

## 2025-05-27 PROCEDURE — 98005 SYNCH AUDIO-VIDEO EST LOW 20: CPT | Mod: 95,,, | Performed by: PSYCHIATRY & NEUROLOGY

## 2025-05-27 RX ORDER — LEVETIRACETAM 750 MG/1
1500 TABLET ORAL 2 TIMES DAILY
Qty: 360 TABLET | Refills: 3 | Status: SHIPPED | OUTPATIENT
Start: 2025-05-27

## 2025-05-27 RX ORDER — LAMOTRIGINE 200 MG/1
TABLET ORAL
Qty: 360 TABLET | Refills: 3 | Status: SHIPPED | OUTPATIENT
Start: 2025-05-27 | End: 2026-05-22
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
## 2025-07-07 NOTE — TELEPHONE ENCOUNTER
----- Message from Stephon Bettencourt sent at 7/29/2019 12:29 PM CDT -----  Contact: Pharmacy   Rx Refill/Request     Is this a Refill or New Rx:  Yes   Rx Name and Strength:  levETIRAcetam (KEPPRA) 750 MG Tab  Preferred Pharmacy with phone number:       [id number] [address] [place]  [address] [place] [id number]  Phone: [phone] Fax: [phone]       Recent Visits  Date Type Provider Dept   05/05/25 Office Visit Ismael Teague, MD [facility]   01/22/25 Office Visit Ismael Teague MD [facility]   10/31/24 Office Visit Ismael Teague MD [facility]   09/12/24 Office Visit Ismael Teague MD [facility]   07/12/24 Office Visit Ismael Teague MD [facility]   06/04/24 Office Visit Ismael Teague MD [facility]   02/22/24 Office Visit Ismael Teague MD [facility]   01/19/24 Office Visit Ismael Teague, MD [facility]    Showing recent visits within past 540 days with a meds authorizing provider and meeting all other requirements  Future Appointments  No visits were found meeting these conditions.  Showing future appointments within next 150 days with a meds authorizing provider and meeting all other requirements

## 2025-07-29 DIAGNOSIS — G40.219 PARTIAL SYMPTOMATIC EPILEPSY WITH COMPLEX PARTIAL SEIZURES, INTRACTABLE, WITHOUT STATUS EPILEPTICUS: ICD-10-CM

## 2025-07-29 RX ORDER — LAMOTRIGINE 200 MG/1
TABLET ORAL
Qty: 450 TABLET | Refills: 3 | Status: SHIPPED | OUTPATIENT
Start: 2025-07-29 | End: 2026-07-24